# Patient Record
Sex: FEMALE | Race: ASIAN | NOT HISPANIC OR LATINO | Employment: UNEMPLOYED | URBAN - METROPOLITAN AREA
[De-identification: names, ages, dates, MRNs, and addresses within clinical notes are randomized per-mention and may not be internally consistent; named-entity substitution may affect disease eponyms.]

---

## 2017-03-10 ENCOUNTER — HISTORICAL (OUTPATIENT)
Dept: INTERNAL MEDICINE | Facility: CLINIC | Age: 42
End: 2017-03-10

## 2017-03-31 ENCOUNTER — HISTORICAL (OUTPATIENT)
Dept: INTERNAL MEDICINE | Facility: CLINIC | Age: 42
End: 2017-03-31

## 2017-04-07 ENCOUNTER — HISTORICAL (OUTPATIENT)
Dept: INTERNAL MEDICINE | Facility: CLINIC | Age: 42
End: 2017-04-07

## 2017-04-20 ENCOUNTER — HISTORICAL (OUTPATIENT)
Dept: ADMINISTRATIVE | Facility: HOSPITAL | Age: 42
End: 2017-04-20

## 2017-11-14 ENCOUNTER — HISTORICAL (OUTPATIENT)
Dept: RADIOLOGY | Facility: HOSPITAL | Age: 42
End: 2017-11-14

## 2017-12-22 ENCOUNTER — HISTORICAL (OUTPATIENT)
Dept: RADIOLOGY | Facility: HOSPITAL | Age: 42
End: 2017-12-22

## 2017-12-22 LAB — POC CREATININE: 0.6 MG/DL (ref 0.6–1.3)

## 2018-03-20 LAB
BILIRUB SERPL-MCNC: NEGATIVE MG/DL
BLOOD URINE, POC: NEGATIVE
CLARITY, POC UA: CLEAR
COLOR, POC UA: NORMAL
GLUCOSE UR QL STRIP: NEGATIVE
KETONES UR QL STRIP: NEGATIVE
LEUKOCYTE EST, POC UA: NEGATIVE
NITRITE, POC UA: NEGATIVE
PH, POC UA: 6
PROTEIN, POC: NORMAL
SPECIFIC GRAVITY, POC UA: 1.03
UROBILINOGEN, POC UA: NORMAL

## 2018-04-05 ENCOUNTER — HOSPITAL ENCOUNTER (OUTPATIENT)
Dept: MEDSURG UNIT | Facility: HOSPITAL | Age: 43
End: 2018-04-08
Attending: INTERNAL MEDICINE | Admitting: INTERNAL MEDICINE

## 2018-04-05 LAB
ABS NEUT (OLG): 3.24 X10(3)/MCL (ref 2.1–9.2)
ALBUMIN SERPL-MCNC: 3.7 GM/DL (ref 3.4–5)
ALBUMIN/GLOB SERPL: 0.8 RATIO (ref 1.1–2)
ALP SERPL-CCNC: 60 UNIT/L (ref 38–126)
ALT SERPL-CCNC: 19 UNIT/L (ref 12–78)
APTT PPP: 34.4 SECOND(S) (ref 24.8–36.9)
AST SERPL-CCNC: 23 UNIT/L (ref 15–37)
BASOPHILS # BLD AUTO: 0.1 X10(3)/MCL (ref 0–0.2)
BASOPHILS NFR BLD AUTO: 1 %
BILIRUB SERPL-MCNC: 0.2 MG/DL (ref 0.2–1)
BILIRUBIN DIRECT+TOT PNL SERPL-MCNC: 0.1 MG/DL (ref 0–0.5)
BILIRUBIN DIRECT+TOT PNL SERPL-MCNC: 0.1 MG/DL (ref 0–0.8)
BUN SERPL-MCNC: 12 MG/DL (ref 7–18)
CALCIUM SERPL-MCNC: 8.6 MG/DL (ref 8.5–10.1)
CHLORIDE SERPL-SCNC: 104 MMOL/L (ref 98–107)
CO2 SERPL-SCNC: 27 MMOL/L (ref 21–32)
CREAT SERPL-MCNC: 0.7 MG/DL (ref 0.55–1.02)
EOSINOPHIL # BLD AUTO: 0.2 X10(3)/MCL (ref 0–0.9)
EOSINOPHIL NFR BLD AUTO: 3 %
ERYTHROCYTE [DISTWIDTH] IN BLOOD BY AUTOMATED COUNT: 16.2 % (ref 11.5–17)
GLOBULIN SER-MCNC: 4.4 GM/DL (ref 2.4–3.5)
GLUCOSE SERPL-MCNC: 76 MG/DL (ref 74–106)
HCT VFR BLD AUTO: 32.9 % (ref 37–47)
HGB BLD-MCNC: 9.4 GM/DL (ref 12–16)
INR PPP: 0.93 (ref 0–1.27)
LYMPHOCYTES # BLD AUTO: 2.3 X10(3)/MCL (ref 0.6–4.6)
LYMPHOCYTES NFR BLD AUTO: 35 % (ref 13–40)
MCH RBC QN AUTO: 21 PG (ref 27–31)
MCHC RBC AUTO-ENTMCNC: 28.6 GM/DL (ref 33–36)
MCV RBC AUTO: 73.6 FL (ref 80–94)
MONOCYTES # BLD AUTO: 0.8 X10(3)/MCL (ref 0.1–1.3)
MONOCYTES NFR BLD AUTO: 12 %
NEUTROPHILS # BLD AUTO: 3.24 X10(3)/MCL (ref 2.1–9.2)
NEUTROPHILS NFR BLD AUTO: 49 %
PLATELET # BLD AUTO: 401 X10(3)/MCL (ref 130–400)
PLATELET # BLD EST: NORMAL 10*3/UL
PMV BLD AUTO: 10.1 FL (ref 7.4–10.4)
POIKILOCYTOSIS BLD QL SMEAR: 1
POTASSIUM SERPL-SCNC: 3.6 MMOL/L (ref 3.5–5.1)
PROT SERPL-MCNC: 8.1 GM/DL (ref 6.4–8.2)
PROTHROMBIN TIME: 12.7 SECOND(S) (ref 12.2–14.7)
RBC # BLD AUTO: 4.47 X10(6)/MCL (ref 4.2–5.4)
SCHISTOCYTES BLD QL AUTO: 1
SODIUM SERPL-SCNC: 138 MMOL/L (ref 136–145)
WBC # SPEC AUTO: 6.6 X10(3)/MCL (ref 4.5–11.5)

## 2018-04-06 LAB
AMPHET UR QL SCN: NORMAL
APPEARANCE, UA: CLEAR
B-HCG SERPL QL: NEGATIVE
BACTERIA SPEC CULT: ABNORMAL /HPF
BARBITURATE SCN PRESENT UR: NORMAL
BENZODIAZ UR QL SCN: NORMAL
BILIRUB UR QL STRIP: NEGATIVE
CANNABINOIDS UR QL SCN: NORMAL
CHOLEST SERPL-MCNC: 168 MG/DL (ref 0–200)
CHOLEST/HDLC SERPL: 3.2 {RATIO} (ref 0–4)
CK SERPL-CCNC: 63 UNIT/L (ref 26–192)
COCAINE UR QL SCN: NORMAL
COLOR UR: YELLOW
CRP SERPL HS-MCNC: <0.16 MG/L (ref 0–3)
ERYTHROCYTE [SEDIMENTATION RATE] IN BLOOD: 11 MM/HR (ref 0–20)
EST. AVERAGE GLUCOSE BLD GHB EST-MCNC: 103 MG/DL
GLUCOSE (UA): NEGATIVE
HBA1C MFR BLD: 5.2 % (ref 4.2–6.3)
HDLC SERPL-MCNC: 52 MG/DL (ref 35–60)
HGB UR QL STRIP: NEGATIVE
KETONES UR QL STRIP: NEGATIVE
LDLC SERPL CALC-MCNC: 100 MG/DL (ref 0–129)
LEUKOCYTE ESTERASE UR QL STRIP: NEGATIVE
MUCOUS THREADS URNS QL MICRO: ABNORMAL
NITRITE UR QL STRIP: NEGATIVE
OPIATES UR QL SCN: NORMAL
PCP UR QL: NORMAL
PH UR STRIP.AUTO: 6.5 [PH] (ref 5–7.5)
PH UR STRIP: 6.5 [PH] (ref 5–9)
PROT UR QL STRIP: NEGATIVE
RBC #/AREA URNS HPF: ABNORMAL /[HPF]
SP GR FLD REFRACTOMETRY: 1.02 (ref 1–1.03)
SP GR UR STRIP: 1.02 (ref 1–1.03)
SQUAMOUS EPITHELIAL, UA: ABNORMAL
TRIGL SERPL-MCNC: 80 MG/DL (ref 30–150)
TROPONIN I SERPL-MCNC: <0.02 NG/ML (ref 0.02–0.49)
UA WBC MAN: ABNORMAL /HPF
UROBILINOGEN UR STRIP-ACNC: 0.2
VLDLC SERPL CALC-MCNC: 16 MG/DL

## 2018-04-07 LAB
(HCYS)2 SERPL-MCNC: 5.3 MCMOL/L (ref 3.2–10.7)
ABS NEUT (OLG): 2.34 X10(3)/MCL (ref 2.1–9.2)
ABS NEUT (OLG): 2.52 X10(3)/MCL (ref 2.1–9.2)
ALBUMIN SERPL-MCNC: 3 GM/DL (ref 3.4–5)
ALBUMIN SERPL-MCNC: 3.1 GM/DL (ref 3.4–5)
ALBUMIN/GLOB SERPL: 0.9 {RATIO}
ALBUMIN/GLOB SERPL: 1 {RATIO}
ALP SERPL-CCNC: 49 UNIT/L (ref 38–126)
ALP SERPL-CCNC: 51 UNIT/L (ref 38–126)
ALT SERPL-CCNC: 14 UNIT/L (ref 12–78)
ALT SERPL-CCNC: 15 UNIT/L (ref 12–78)
AST SERPL-CCNC: 12 UNIT/L (ref 15–37)
AST SERPL-CCNC: 14 UNIT/L (ref 15–37)
AT III ACT/NOR PPP CHRO: 113 % (ref 82–139)
BASOPHILS # BLD AUTO: 0 X10(3)/MCL (ref 0–0.2)
BASOPHILS # BLD AUTO: 0 X10(3)/MCL (ref 0–0.2)
BASOPHILS NFR BLD AUTO: 1 %
BASOPHILS NFR BLD AUTO: 1 %
BILIRUB SERPL-MCNC: 0.3 MG/DL (ref 0.2–1)
BILIRUB SERPL-MCNC: 0.4 MG/DL (ref 0.2–1)
BILIRUBIN DIRECT+TOT PNL SERPL-MCNC: 0.1 MG/DL (ref 0–0.2)
BILIRUBIN DIRECT+TOT PNL SERPL-MCNC: 0.1 MG/DL (ref 0–0.2)
BILIRUBIN DIRECT+TOT PNL SERPL-MCNC: 0.2 MG/DL (ref 0–0.8)
BILIRUBIN DIRECT+TOT PNL SERPL-MCNC: 0.3 MG/DL (ref 0–0.8)
BUN SERPL-MCNC: 7 MG/DL (ref 7–18)
BUN SERPL-MCNC: 7 MG/DL (ref 7–18)
CALCIUM SERPL-MCNC: 7.7 MG/DL (ref 8.5–10.1)
CALCIUM SERPL-MCNC: 8 MG/DL (ref 8.5–10.1)
CHLORIDE SERPL-SCNC: 109 MMOL/L (ref 98–107)
CHLORIDE SERPL-SCNC: 110 MMOL/L (ref 98–107)
CHOLEST SERPL-MCNC: 153 MG/DL (ref 0–200)
CHOLEST/HDLC SERPL: 3.3 {RATIO} (ref 0–4)
CO2 SERPL-SCNC: 26 MMOL/L (ref 21–32)
CO2 SERPL-SCNC: 28 MMOL/L (ref 21–32)
CREAT SERPL-MCNC: 0.5 MG/DL (ref 0.55–1.02)
CREAT SERPL-MCNC: 0.52 MG/DL (ref 0.55–1.02)
CRP SERPL HS-MCNC: 0.22 MG/L (ref 0–3)
EOSINOPHIL # BLD AUTO: 0.2 X10(3)/MCL (ref 0–0.9)
EOSINOPHIL # BLD AUTO: 0.2 X10(3)/MCL (ref 0–0.9)
EOSINOPHIL NFR BLD AUTO: 3 %
EOSINOPHIL NFR BLD AUTO: 4 %
ERYTHROCYTE [DISTWIDTH] IN BLOOD BY AUTOMATED COUNT: 16.2 % (ref 11.5–17)
ERYTHROCYTE [DISTWIDTH] IN BLOOD BY AUTOMATED COUNT: 16.2 % (ref 11.5–17)
ERYTHROCYTE [SEDIMENTATION RATE] IN BLOOD: 14 MM/HR (ref 0–20)
EST. AVERAGE GLUCOSE BLD GHB EST-MCNC: 100 MG/DL
GLOBULIN SER-MCNC: 3.1 GM/DL (ref 2.4–3.5)
GLOBULIN SER-MCNC: 3.5 GM/DL (ref 2.4–3.5)
GLUCOSE SERPL-MCNC: 82 MG/DL (ref 74–106)
GLUCOSE SERPL-MCNC: 84 MG/DL (ref 74–106)
HBA1C MFR BLD: 5.1 % (ref 4.2–6.3)
HCT VFR BLD AUTO: 28.9 % (ref 37–47)
HCT VFR BLD AUTO: 31.6 % (ref 37–47)
HDLC SERPL-MCNC: 47 MG/DL (ref 35–60)
HGB BLD-MCNC: 8.4 GM/DL (ref 12–16)
HGB BLD-MCNC: 8.9 GM/DL (ref 12–16)
HIV 1+2 AB+HIV1 P24 AG SERPL QL IA: NEGATIVE
LDLC SERPL CALC-MCNC: 85 MG/DL (ref 0–129)
LYMPHOCYTES # BLD AUTO: 2.1 X10(3)/MCL (ref 0.6–4.6)
LYMPHOCYTES # BLD AUTO: 2.1 X10(3)/MCL (ref 0.6–4.6)
LYMPHOCYTES NFR BLD AUTO: 39 %
LYMPHOCYTES NFR BLD AUTO: 40 %
MAGNESIUM SERPL-MCNC: 2.1 MG/DL (ref 1.8–2.4)
MCH RBC QN AUTO: 21 PG (ref 27–31)
MCH RBC QN AUTO: 21.3 PG (ref 27–31)
MCHC RBC AUTO-ENTMCNC: 28.2 GM/DL (ref 33–36)
MCHC RBC AUTO-ENTMCNC: 29.1 GM/DL (ref 33–36)
MCV RBC AUTO: 72.3 FL (ref 80–94)
MCV RBC AUTO: 75.8 FL (ref 80–94)
MONOCYTES # BLD AUTO: 0.5 X10(3)/MCL (ref 0.1–1.3)
MONOCYTES # BLD AUTO: 0.6 X10(3)/MCL (ref 0.1–1.3)
MONOCYTES NFR BLD AUTO: 12 %
MONOCYTES NFR BLD AUTO: 9 %
NEUTROPHILS # BLD AUTO: 2.34 X10(3)/MCL (ref 2.1–9.2)
NEUTROPHILS # BLD AUTO: 2.52 X10(3)/MCL (ref 1.4–7.9)
NEUTROPHILS NFR BLD AUTO: 44 %
NEUTROPHILS NFR BLD AUTO: 47 %
PHOSPHATE SERPL-MCNC: 3.6 MG/DL (ref 2.5–4.9)
PLATELET # BLD AUTO: 346 X10(3)/MCL (ref 130–400)
PLATELET # BLD AUTO: 358 X10(3)/MCL (ref 130–400)
PMV BLD AUTO: 10.3 FL (ref 9.4–12.4)
PMV BLD AUTO: 10.7 FL (ref 9.4–12.4)
POTASSIUM SERPL-SCNC: 4.1 MMOL/L (ref 3.5–5.1)
POTASSIUM SERPL-SCNC: 4.1 MMOL/L (ref 3.5–5.1)
PROT SERPL-MCNC: 6.1 GM/DL (ref 6.4–8.2)
PROT SERPL-MCNC: 6.5 GM/DL
PROT SERPL-MCNC: 6.6 GM/DL (ref 6.4–8.2)
RBC # BLD AUTO: 4 X10(6)/MCL (ref 4.2–5.4)
RBC # BLD AUTO: 4.17 X10(6)/MCL (ref 4.2–5.4)
RPR SER QL: NORMAL
SODIUM SERPL-SCNC: 141 MMOL/L (ref 136–145)
SODIUM SERPL-SCNC: 142 MMOL/L (ref 136–145)
TRIGL SERPL-MCNC: 104 MG/DL (ref 30–150)
VLDLC SERPL CALC-MCNC: 21 MG/DL
WBC # SPEC AUTO: 5.3 X10(3)/MCL (ref 4.5–11.5)
WBC # SPEC AUTO: 5.4 X10(3)/MCL (ref 4.5–11.5)

## 2018-04-08 LAB
ABS NEUT (OLG): 2.31 X10(3)/MCL (ref 2.1–9.2)
BASOPHILS # BLD AUTO: 0.1 X10(3)/MCL (ref 0–0.2)
BASOPHILS NFR BLD AUTO: 1 %
BUN SERPL-MCNC: 9 MG/DL (ref 7–18)
CALCIUM SERPL-MCNC: 8.5 MG/DL (ref 8.5–10.1)
CHLORIDE SERPL-SCNC: 108 MMOL/L (ref 98–107)
CO2 SERPL-SCNC: 25 MMOL/L (ref 21–32)
CREAT SERPL-MCNC: 0.52 MG/DL (ref 0.55–1.02)
CREAT/UREA NIT SERPL: 17.3
EOSINOPHIL # BLD AUTO: 0.2 X10(3)/MCL (ref 0–0.9)
EOSINOPHIL NFR BLD AUTO: 4 %
ERYTHROCYTE [DISTWIDTH] IN BLOOD BY AUTOMATED COUNT: 16.2 % (ref 11.5–17)
FINAL CULTURE: NO GROWTH
GLUCOSE SERPL-MCNC: 88 MG/DL (ref 74–106)
HCT VFR BLD AUTO: 29.6 % (ref 37–47)
HGB BLD-MCNC: 8.3 GM/DL (ref 12–16)
LYMPHOCYTES # BLD AUTO: 2.1 X10(3)/MCL (ref 0.6–4.6)
LYMPHOCYTES NFR BLD AUTO: 40 %
MCH RBC QN AUTO: 21.3 PG (ref 27–31)
MCHC RBC AUTO-ENTMCNC: 28 GM/DL (ref 33–36)
MCV RBC AUTO: 75.9 FL (ref 80–94)
MONOCYTES # BLD AUTO: 0.6 X10(3)/MCL (ref 0.1–1.3)
MONOCYTES NFR BLD AUTO: 11 %
NEUTROPHILS # BLD AUTO: 2.31 X10(3)/MCL (ref 1.4–7.9)
NEUTROPHILS NFR BLD AUTO: 44 %
PLATELET # BLD AUTO: 362 X10(3)/MCL (ref 130–400)
PMV BLD AUTO: 10.4 FL (ref 9.4–12.4)
POTASSIUM SERPL-SCNC: 4.1 MMOL/L (ref 3.5–5.1)
RBC # BLD AUTO: 3.9 X10(6)/MCL (ref 4.2–5.4)
SODIUM SERPL-SCNC: 139 MMOL/L (ref 136–145)
WBC # SPEC AUTO: 5.2 X10(3)/MCL (ref 4.5–11.5)

## 2018-04-13 LAB
FINAL CULTURE: NORMAL
FINAL CULTURE: NORMAL

## 2018-06-19 ENCOUNTER — HISTORICAL (OUTPATIENT)
Dept: ADMINISTRATIVE | Facility: HOSPITAL | Age: 43
End: 2018-06-19

## 2018-06-19 ENCOUNTER — HISTORICAL (OUTPATIENT)
Dept: LAB | Facility: HOSPITAL | Age: 43
End: 2018-06-19

## 2018-06-19 LAB
ABS NEUT (OLG): 6.33 X10(3)/MCL (ref 2.1–9.2)
ANISOCYTOSIS BLD QL SMEAR: NORMAL
BASOPHILS # BLD AUTO: 0.07 X10(3)/MCL
BASOPHILS NFR BLD AUTO: 1 %
DACRYOCYTES BLD QL SMEAR: NORMAL
EOSINOPHIL # BLD AUTO: 0.14 X10(3)/MCL
EOSINOPHIL NFR BLD AUTO: 2 %
ERYTHROCYTE [DISTWIDTH] IN BLOOD BY AUTOMATED COUNT: 18.6 % (ref 11.5–14.5)
HCT VFR BLD AUTO: 28.7 % (ref 35–46)
HGB BLD-MCNC: 7.8 GM/DL (ref 12–16)
HYPOCHROMIA BLD QL SMEAR: NORMAL
IMM GRANULOCYTES # BLD AUTO: 0.02 10*3/UL
IMM GRANULOCYTES NFR BLD AUTO: 0 %
IRON SATN MFR SERPL: 3.7 % (ref 15–50)
IRON SERPL-MCNC: 15 MCG/DL (ref 50–170)
LYMPHOCYTES # BLD AUTO: 1.37 X10(3)/MCL
LYMPHOCYTES NFR BLD AUTO: 15 % (ref 13–40)
MCH RBC QN AUTO: 18.5 PG (ref 26–34)
MCHC RBC AUTO-ENTMCNC: 27.2 GM/DL (ref 31–37)
MCV RBC AUTO: 68.2 FL (ref 80–100)
MICROCYTES BLD QL SMEAR: NORMAL
MONOCYTES # BLD AUTO: 1.18 X10(3)/MCL
MONOCYTES NFR BLD AUTO: 13 % (ref 4–12)
NEUTROPHILS # BLD AUTO: 6.33 X10(3)/MCL
NEUTROPHILS NFR BLD AUTO: 70 X10(3)/MCL
PLATELET # BLD AUTO: 341 X10(3)/MCL (ref 130–400)
PLATELET # BLD EST: ADEQUATE 10*3/UL
PMV BLD AUTO: 10.6 FL (ref 7.4–10.4)
POLYCHROMASIA BLD QL SMEAR: NORMAL
RBC # BLD AUTO: 4.21 X10(6)/MCL (ref 4–5.2)
RBC MORPH BLD: NORMAL
TIBC SERPL-MCNC: 402 MCG/DL (ref 250–450)
TRANSFERRIN SERPL-MCNC: 340 MG/DL (ref 200–360)
WBC # SPEC AUTO: 9.1 X10(3)/MCL (ref 4.5–11)

## 2018-06-21 ENCOUNTER — HISTORICAL (OUTPATIENT)
Dept: INTERNAL MEDICINE | Facility: CLINIC | Age: 43
End: 2018-06-21

## 2018-06-21 ENCOUNTER — HISTORICAL (OUTPATIENT)
Dept: ADMINISTRATIVE | Facility: HOSPITAL | Age: 43
End: 2018-06-21

## 2018-06-21 LAB
ABS NEUT (OLG): 5.46 X10(3)/MCL (ref 2.1–9.2)
APPEARANCE, UA: CLEAR
BACTERIA #/AREA URNS AUTO: ABNORMAL /[HPF]
BASOPHILS # BLD AUTO: 0.07 X10(3)/MCL
BASOPHILS NFR BLD AUTO: 1 %
BILIRUB UR QL STRIP: NEGATIVE
COLOR UR: YELLOW
EOSINOPHIL # BLD AUTO: 0.15 X10(3)/MCL
EOSINOPHIL NFR BLD AUTO: 2 %
ERYTHROCYTE [DISTWIDTH] IN BLOOD BY AUTOMATED COUNT: 18.8 % (ref 11.5–14.5)
FINAL CULTURE: NORMAL
GLUCOSE (UA): NORMAL
HCT VFR BLD AUTO: 29.8 % (ref 35–46)
HGB BLD-MCNC: 7.8 GM/DL (ref 12–16)
HGB UR QL STRIP: NEGATIVE
HYALINE CASTS #/AREA URNS LPF: ABNORMAL /[LPF]
IMM GRANULOCYTES # BLD AUTO: 0.02 10*3/UL
IMM GRANULOCYTES NFR BLD AUTO: 0 %
KETONES UR QL STRIP: NEGATIVE
LEUKOCYTE ESTERASE UR QL STRIP: NEGATIVE
LYMPHOCYTES # BLD AUTO: 1.97 X10(3)/MCL
LYMPHOCYTES NFR BLD AUTO: 23 % (ref 13–40)
MCH RBC QN AUTO: 18.2 PG (ref 26–34)
MCHC RBC AUTO-ENTMCNC: 26.2 GM/DL (ref 31–37)
MCV RBC AUTO: 69.5 FL (ref 80–100)
MONOCYTES # BLD AUTO: 0.99 X10(3)/MCL
MONOCYTES NFR BLD AUTO: 11 % (ref 4–12)
NEUTROPHILS # BLD AUTO: 5.46 X10(3)/MCL
NEUTROPHILS NFR BLD AUTO: 63 X10(3)/MCL
NITRITE UR QL STRIP: NEGATIVE
PH UR STRIP: 6.5 [PH] (ref 4.5–8)
PLATELET # BLD AUTO: 362 X10(3)/MCL (ref 130–400)
PMV BLD AUTO: 10.8 FL (ref 7.4–10.4)
PROT UR QL STRIP: 30 MG/DL
RBC # BLD AUTO: 4.29 X10(6)/MCL (ref 4–5.2)
RBC #/AREA URNS AUTO: ABNORMAL /[HPF]
SP GR UR STRIP: 1.03 (ref 1–1.03)
SQUAMOUS #/AREA URNS LPF: >100 /[LPF]
UROBILINOGEN UR STRIP-ACNC: 2 MG/DL
WBC # SPEC AUTO: 8.7 X10(3)/MCL (ref 4.5–11)
WBC #/AREA URNS AUTO: ABNORMAL /HPF

## 2018-07-12 ENCOUNTER — HISTORICAL (OUTPATIENT)
Dept: INTERNAL MEDICINE | Facility: CLINIC | Age: 43
End: 2018-07-12

## 2018-07-12 LAB
ABS NEUT (OLG): 3 X10(3)/MCL
ANISOCYTOSIS BLD QL SMEAR: ABNORMAL
BASOPHILS NFR BLD MANUAL: 2 %
EOSINOPHIL NFR BLD MANUAL: 1 %
ERYTHROCYTE [DISTWIDTH] IN BLOOD BY AUTOMATED COUNT: ABNORMAL % (ref 11.5–14.5)
GRANULOCYTES NFR BLD MANUAL: 54 % (ref 43–75)
HCT VFR BLD AUTO: 38.3 % (ref 35–46)
HGB BLD-MCNC: 10.9 GM/DL (ref 12–16)
HYPOCHROMIA BLD QL SMEAR: ABNORMAL
LYMPHOCYTES NFR BLD MANUAL: 36 % (ref 20.5–51.1)
MACROCYTES BLD QL SMEAR: ABNORMAL
MCH RBC QN AUTO: 22.9 PG (ref 26–34)
MCHC RBC AUTO-ENTMCNC: 28.5 GM/DL (ref 31–37)
MCV RBC AUTO: 80.3 FL (ref 80–100)
MICROCYTES BLD QL SMEAR: ABNORMAL
MONOCYTES NFR BLD MANUAL: 7 % (ref 2–9)
OVALOCYTES BLD QL SMEAR: ABNORMAL
PLATELET # BLD AUTO: 373 X10(3)/MCL (ref 130–400)
PLATELET # BLD EST: NORMAL 10*3/UL
PMV BLD AUTO: 11.1 FL (ref 7.4–10.4)
POLYCHROMASIA BLD QL SMEAR: ABNORMAL
RBC # BLD AUTO: 4.77 X10(6)/MCL (ref 4–5.2)
RBC MORPH BLD: ABNORMAL
SCHISTOCYTES BLD QL AUTO: ABNORMAL
WBC # SPEC AUTO: 5.9 X10(3)/MCL (ref 4.5–11)

## 2018-07-17 ENCOUNTER — HISTORICAL (OUTPATIENT)
Dept: INTERNAL MEDICINE | Facility: CLINIC | Age: 43
End: 2018-07-17

## 2018-07-24 LAB — POC BETA-HCG (QUAL): NEGATIVE

## 2018-07-25 ENCOUNTER — HISTORICAL (OUTPATIENT)
Dept: FAMILY MEDICINE | Facility: CLINIC | Age: 43
End: 2018-07-25

## 2018-07-25 LAB
ABS NEUT (OLG): 3.33 X10(3)/MCL (ref 2.1–9.2)
ANISOCYTOSIS BLD QL SMEAR: NORMAL
BASOPHILS # BLD AUTO: 0.05 X10(3)/MCL
BASOPHILS NFR BLD AUTO: 1 %
EOSINOPHIL # BLD AUTO: 0.1 X10(3)/MCL
EOSINOPHIL NFR BLD AUTO: 2 %
ERYTHROCYTE [DISTWIDTH] IN BLOOD BY AUTOMATED COUNT: ABNORMAL % (ref 11.5–14.5)
HCT VFR BLD AUTO: 37.4 % (ref 35–46)
HGB BLD-MCNC: 11.1 GM/DL (ref 12–16)
HYPOCHROMIA BLD QL SMEAR: NORMAL
IMM GRANULOCYTES # BLD AUTO: 0.02 10*3/UL
IMM GRANULOCYTES NFR BLD AUTO: 0 %
LYMPHOCYTES # BLD AUTO: 2.08 X10(3)/MCL
LYMPHOCYTES NFR BLD AUTO: 33 % (ref 13–40)
MCH RBC QN AUTO: 24.3 PG (ref 26–34)
MCHC RBC AUTO-ENTMCNC: 29.7 GM/DL (ref 31–37)
MCV RBC AUTO: 81.8 FL (ref 80–100)
MICROCYTES BLD QL SMEAR: NORMAL
MONOCYTES # BLD AUTO: 0.72 X10(3)/MCL
MONOCYTES NFR BLD AUTO: 11 % (ref 4–12)
NEUTROPHILS # BLD AUTO: 3.33 X10(3)/MCL
NEUTROPHILS NFR BLD AUTO: 53 X10(3)/MCL
PLATELET # BLD AUTO: 285 X10(3)/MCL (ref 130–400)
PLATELET # BLD EST: ADEQUATE 10*3/UL
PMV BLD AUTO: 10.4 FL (ref 7.4–10.4)
POIKILOCYTOSIS BLD QL SMEAR: NORMAL
POLYCHROMASIA BLD QL SMEAR: NORMAL
RBC # BLD AUTO: 4.57 X10(6)/MCL (ref 4–5.2)
RBC MORPH BLD: NORMAL
TSH SERPL-ACNC: 0.71 MIU/L (ref 0.36–3.74)
WBC # SPEC AUTO: 6.3 X10(3)/MCL (ref 4.5–11)

## 2018-10-05 ENCOUNTER — HISTORICAL (OUTPATIENT)
Dept: RADIOLOGY | Facility: HOSPITAL | Age: 43
End: 2018-10-05

## 2018-10-19 ENCOUNTER — HISTORICAL (OUTPATIENT)
Dept: ADMINISTRATIVE | Facility: HOSPITAL | Age: 43
End: 2018-10-19

## 2018-10-19 LAB
ABS NEUT (OLG): 2.68 X10(3)/MCL (ref 2.1–9.2)
BASOPHILS # BLD AUTO: 0.07 X10(3)/MCL
BASOPHILS NFR BLD AUTO: 1 %
EOSINOPHIL # BLD AUTO: 0.15 X10(3)/MCL
EOSINOPHIL NFR BLD AUTO: 3 %
ERYTHROCYTE [DISTWIDTH] IN BLOOD BY AUTOMATED COUNT: 16.3 % (ref 11.5–14.5)
HAV IGM SERPL QL IA: NONREACTIVE
HBV CORE IGM SERPL QL IA: NONREACTIVE
HBV SURFACE AG SERPL QL IA: NEGATIVE
HCT VFR BLD AUTO: 35.6 % (ref 35–46)
HCV AB SERPL QL IA: NONREACTIVE
HGB BLD-MCNC: 10.6 GM/DL (ref 12–16)
IMM GRANULOCYTES # BLD AUTO: 0.01 10*3/UL
IMM GRANULOCYTES NFR BLD AUTO: 0 %
IRON SATN MFR SERPL: 5.4 % (ref 15–50)
IRON SERPL-MCNC: 23 MCG/DL (ref 50–170)
LYMPHOCYTES # BLD AUTO: 1.76 X10(3)/MCL
LYMPHOCYTES NFR BLD AUTO: 35 % (ref 13–40)
MCH RBC QN AUTO: 23.3 PG (ref 26–34)
MCHC RBC AUTO-ENTMCNC: 29.8 GM/DL (ref 31–37)
MCV RBC AUTO: 78.2 FL (ref 80–100)
MONOCYTES # BLD AUTO: 0.37 X10(3)/MCL
MONOCYTES NFR BLD AUTO: 7 % (ref 4–12)
NEUTROPHILS # BLD AUTO: 2.68 X10(3)/MCL
NEUTROPHILS NFR BLD AUTO: 53 X10(3)/MCL
PLATELET # BLD AUTO: 367 X10(3)/MCL (ref 130–400)
PMV BLD AUTO: 10.9 FL (ref 7.4–10.4)
RBC # BLD AUTO: 4.55 X10(6)/MCL (ref 4–5.2)
TIBC SERPL-MCNC: 424 MCG/DL (ref 250–450)
TRANSFERRIN SERPL-MCNC: 316 MG/DL (ref 200–360)
TSH SERPL-ACNC: 0.56 MIU/L (ref 0.36–3.74)
WBC # SPEC AUTO: 5 X10(3)/MCL (ref 4.5–11)

## 2018-10-25 ENCOUNTER — HISTORICAL (OUTPATIENT)
Dept: RADIOLOGY | Facility: HOSPITAL | Age: 43
End: 2018-10-25

## 2018-12-20 ENCOUNTER — HISTORICAL (OUTPATIENT)
Dept: INTERNAL MEDICINE | Facility: CLINIC | Age: 43
End: 2018-12-20

## 2019-02-27 ENCOUNTER — HISTORICAL (OUTPATIENT)
Dept: INTERNAL MEDICINE | Facility: CLINIC | Age: 44
End: 2019-02-27

## 2019-02-27 LAB
ABS NEUT (OLG): 2.41 X10(3)/MCL (ref 2.1–9.2)
ALBUMIN SERPL-MCNC: 3.9 GM/DL (ref 3.4–5)
ALBUMIN/GLOB SERPL: 0.9 RATIO (ref 1.1–2)
ALP SERPL-CCNC: 64 UNIT/L (ref 45–117)
ALT SERPL-CCNC: 19 UNIT/L (ref 12–78)
AST SERPL-CCNC: 17 UNIT/L (ref 15–37)
BASOPHILS # BLD AUTO: 0.05 X10(3)/MCL
BASOPHILS NFR BLD AUTO: 1 %
BILIRUB SERPL-MCNC: 0.5 MG/DL (ref 0.2–1)
BILIRUBIN DIRECT+TOT PNL SERPL-MCNC: 0.1 MG/DL
BILIRUBIN DIRECT+TOT PNL SERPL-MCNC: 0.4 MG/DL
BUN SERPL-MCNC: 16 MG/DL (ref 7–18)
CALCIUM SERPL-MCNC: 8.5 MG/DL (ref 8.5–10.1)
CHLORIDE SERPL-SCNC: 106 MMOL/L (ref 98–107)
CHOLEST SERPL-MCNC: 178 MG/DL
CHOLEST/HDLC SERPL: 3.5 {RATIO} (ref 0–4.4)
CO2 SERPL-SCNC: 26 MMOL/L (ref 21–32)
CREAT SERPL-MCNC: 0.8 MG/DL (ref 0.6–1.3)
EOSINOPHIL # BLD AUTO: 0.14 X10(3)/MCL
EOSINOPHIL NFR BLD AUTO: 3 %
ERYTHROCYTE [DISTWIDTH] IN BLOOD BY AUTOMATED COUNT: 18.3 % (ref 11.5–14.5)
GLOBULIN SER-MCNC: 4.3 GM/ML (ref 2.3–3.5)
GLUCOSE SERPL-MCNC: 91 MG/DL (ref 74–106)
HCT VFR BLD AUTO: 30.6 % (ref 35–46)
HDLC SERPL-MCNC: 51 MG/DL
HGB BLD-MCNC: 8.5 GM/DL (ref 12–16)
IMM GRANULOCYTES # BLD AUTO: 0.01 10*3/UL
IMM GRANULOCYTES NFR BLD AUTO: 0 %
LDLC SERPL CALC-MCNC: 106 MG/DL (ref 0–130)
LYMPHOCYTES # BLD AUTO: 1.44 X10(3)/MCL
LYMPHOCYTES NFR BLD AUTO: 32 % (ref 13–40)
MCH RBC QN AUTO: 19.6 PG (ref 26–34)
MCHC RBC AUTO-ENTMCNC: 27.8 GM/DL (ref 31–37)
MCV RBC AUTO: 70.7 FL (ref 80–100)
MONOCYTES # BLD AUTO: 0.45 X10(3)/MCL
MONOCYTES NFR BLD AUTO: 10 % (ref 4–12)
NEUTROPHILS # BLD AUTO: 2.41 X10(3)/MCL
NEUTROPHILS NFR BLD AUTO: 54 X10(3)/MCL
PLATELET # BLD AUTO: 417 X10(3)/MCL (ref 130–400)
PMV BLD AUTO: 11.3 FL (ref 7.4–10.4)
POTASSIUM SERPL-SCNC: 4.1 MMOL/L (ref 3.5–5.1)
PROT SERPL-MCNC: 8.2 GM/DL (ref 6.4–8.2)
RBC # BLD AUTO: 4.33 X10(6)/MCL (ref 4–5.2)
SODIUM SERPL-SCNC: 138 MMOL/L (ref 136–145)
TRIGL SERPL-MCNC: 105 MG/DL
VLDLC SERPL CALC-MCNC: 21 MG/DL
WBC # SPEC AUTO: 4.5 X10(3)/MCL (ref 4.5–11)

## 2019-03-12 ENCOUNTER — HISTORICAL (OUTPATIENT)
Dept: RADIOLOGY | Facility: HOSPITAL | Age: 44
End: 2019-03-12

## 2019-03-20 ENCOUNTER — HISTORICAL (OUTPATIENT)
Dept: ADMINISTRATIVE | Facility: HOSPITAL | Age: 44
End: 2019-03-20

## 2019-03-20 LAB
ALBUMIN SERPL-MCNC: 3.5 GM/DL (ref 3.4–5)
ALBUMIN/GLOB SERPL: 0.8 RATIO (ref 1.1–2)
ALP SERPL-CCNC: 93 UNIT/L (ref 45–117)
ALT SERPL-CCNC: 34 UNIT/L (ref 12–78)
APPEARANCE, UA: NORMAL
APTT PPP: 37.1 SECOND(S) (ref 23.3–37)
AST SERPL-CCNC: 20 UNIT/L (ref 15–37)
BACTERIA #/AREA URNS AUTO: ABNORMAL /[HPF]
BILIRUB SERPL-MCNC: 0.4 MG/DL (ref 0.2–1)
BILIRUB UR QL STRIP: NEGATIVE
BILIRUBIN DIRECT+TOT PNL SERPL-MCNC: 0.1 MG/DL
BILIRUBIN DIRECT+TOT PNL SERPL-MCNC: 0.3 MG/DL
BUN SERPL-MCNC: 9 MG/DL (ref 7–18)
CALCIUM SERPL-MCNC: 8.6 MG/DL (ref 8.5–10.1)
CHLORIDE SERPL-SCNC: 107 MMOL/L (ref 98–107)
CO2 SERPL-SCNC: 26 MMOL/L (ref 21–32)
COLOR UR: YELLOW
CREAT SERPL-MCNC: 0.6 MG/DL (ref 0.6–1.3)
ERYTHROCYTE [DISTWIDTH] IN BLOOD BY AUTOMATED COUNT: 19.8 % (ref 11.5–14.5)
GLOBULIN SER-MCNC: 4.5 GM/ML (ref 2.3–3.5)
GLUCOSE (UA): NORMAL
GLUCOSE SERPL-MCNC: 102 MG/DL (ref 74–106)
HCT VFR BLD AUTO: 30.3 % (ref 35–46)
HGB BLD-MCNC: 8.1 GM/DL (ref 12–16)
HGB UR QL STRIP: NEGATIVE
HYALINE CASTS #/AREA URNS LPF: ABNORMAL /[LPF]
INR PPP: 0.91 (ref 0.9–1.2)
KETONES UR QL STRIP: NEGATIVE
LEUKOCYTE ESTERASE UR QL STRIP: NEGATIVE
MCH RBC QN AUTO: 18.8 PG (ref 26–34)
MCHC RBC AUTO-ENTMCNC: 26.7 GM/DL (ref 31–37)
MCV RBC AUTO: 70.3 FL (ref 80–100)
NITRITE UR QL STRIP: NEGATIVE
PH UR STRIP: 6 [PH] (ref 4.5–8)
PLATELET # BLD AUTO: 397 X10(3)/MCL (ref 130–400)
PMV BLD AUTO: 11 FL (ref 7.4–10.4)
POTASSIUM SERPL-SCNC: 3.3 MMOL/L (ref 3.5–5.1)
PROT SERPL-MCNC: 8 GM/DL (ref 6.4–8.2)
PROT UR QL STRIP: NEGATIVE
PROTHROMBIN TIME: 12.2 SECOND(S) (ref 11.9–14.4)
RBC # BLD AUTO: 4.31 X10(6)/MCL (ref 4–5.2)
RBC #/AREA URNS AUTO: ABNORMAL /[HPF]
SODIUM SERPL-SCNC: 140 MMOL/L (ref 136–145)
SP GR UR STRIP: 1.01 (ref 1–1.03)
SQUAMOUS #/AREA URNS LPF: >100 /[LPF]
UROBILINOGEN UR STRIP-ACNC: NORMAL
WBC # SPEC AUTO: 12.4 X10(3)/MCL (ref 4.5–11)
WBC #/AREA URNS AUTO: ABNORMAL /HPF

## 2019-04-10 ENCOUNTER — HISTORICAL (OUTPATIENT)
Dept: ADMINISTRATIVE | Facility: HOSPITAL | Age: 44
End: 2019-04-10

## 2019-04-10 LAB
ABS NEUT (OLG): 2.45 X10(3)/MCL (ref 2.1–9.2)
BASOPHILS # BLD AUTO: 0.08 X10(3)/MCL
BASOPHILS NFR BLD AUTO: 1 %
EOSINOPHIL # BLD AUTO: 0.25 10*3/UL
EOSINOPHIL NFR BLD AUTO: 4 %
ERYTHROCYTE [DISTWIDTH] IN BLOOD BY AUTOMATED COUNT: 19.4 % (ref 11.5–14.5)
FERRITIN SERPL-MCNC: 3.3 NG/ML (ref 10–150)
HCT VFR BLD AUTO: 29.4 % (ref 35–46)
HGB BLD-MCNC: 7.7 GM/DL (ref 12–16)
IMM GRANULOCYTES # BLD AUTO: 0.01 10*3/UL
IMM GRANULOCYTES NFR BLD AUTO: 0 %
IRON SATN MFR SERPL: 2.8 % (ref 15–50)
IRON SERPL-MCNC: 12 MCG/DL (ref 50–170)
LYMPHOCYTES # BLD AUTO: 2.38 X10(3)/MCL
LYMPHOCYTES NFR BLD AUTO: 41 % (ref 13–40)
MCH RBC QN AUTO: 18.2 PG (ref 26–34)
MCHC RBC AUTO-ENTMCNC: 26.2 GM/DL (ref 31–37)
MCV RBC AUTO: 69.7 FL (ref 80–100)
MONOCYTES # BLD AUTO: 0.67 X10(3)/MCL
MONOCYTES NFR BLD AUTO: 12 % (ref 4–12)
NEUTROPHILS # BLD AUTO: 2.45 X10(3)/MCL
NEUTROPHILS NFR BLD AUTO: 42 X10(3)/MCL
PLATELET # BLD AUTO: 418 X10(3)/MCL (ref 130–400)
PMV BLD AUTO: 10.6 FL (ref 7.4–10.4)
RBC # BLD AUTO: 4.22 X10(6)/MCL (ref 4–5.2)
T4 FREE SERPL-MCNC: 0.95 NG/DL (ref 0.76–1.46)
TIBC SERPL-MCNC: 431 MCG/DL (ref 250–450)
TRANSFERRIN SERPL-MCNC: 334 MG/DL (ref 200–360)
TSH SERPL-ACNC: 0.47 MIU/L (ref 0.36–3.74)
WBC # SPEC AUTO: 5.8 X10(3)/MCL (ref 4.5–11)

## 2019-04-26 ENCOUNTER — HOSPITAL ENCOUNTER (OUTPATIENT)
Dept: MEDSURG UNIT | Facility: HOSPITAL | Age: 44
End: 2019-04-27
Attending: INTERNAL MEDICINE | Admitting: INTERNAL MEDICINE

## 2019-04-26 LAB
ABS NEUT (OLG): 3.21 X10(3)/MCL (ref 2.1–9.2)
ABS NEUT (OLG): 3.65 X10(3)/MCL
ALBUMIN SERPL-MCNC: 3.5 GM/DL (ref 3.4–5)
ALBUMIN/GLOB SERPL: 0.8 RATIO (ref 1.1–2)
ALP SERPL-CCNC: 67 UNIT/L (ref 45–117)
ALT SERPL-CCNC: 19 UNIT/L (ref 12–78)
ANISOCYTOSIS BLD QL SMEAR: NORMAL
APTT PPP: 33.7 SECOND(S) (ref 23.3–37)
AST SERPL-CCNC: 16 UNIT/L (ref 15–37)
BASOPHILS # BLD AUTO: 0.07 X10(3)/MCL
BASOPHILS # BLD AUTO: 0.07 X10(3)/MCL
BASOPHILS NFR BLD AUTO: 1 %
BASOPHILS NFR BLD AUTO: 1 %
BILIRUB SERPL-MCNC: 0.3 MG/DL (ref 0.2–1)
BILIRUBIN DIRECT+TOT PNL SERPL-MCNC: 0.1 MG/DL
BILIRUBIN DIRECT+TOT PNL SERPL-MCNC: 0.2 MG/DL
BUN SERPL-MCNC: 11 MG/DL (ref 7–18)
CALCIUM SERPL-MCNC: 8.3 MG/DL (ref 8.5–10.1)
CHLORIDE SERPL-SCNC: 106 MMOL/L (ref 98–107)
CO2 SERPL-SCNC: 27 MMOL/L (ref 21–32)
CREAT SERPL-MCNC: 0.7 MG/DL (ref 0.6–1.3)
CROSSMATCH INTERPRETATION: NORMAL
EOSINOPHIL # BLD AUTO: 0.11 X10(3)/MCL
EOSINOPHIL # BLD AUTO: 0.13 X10(3)/MCL
EOSINOPHIL NFR BLD AUTO: 2 %
EOSINOPHIL NFR BLD AUTO: 2 %
ERYTHROCYTE [DISTWIDTH] IN BLOOD BY AUTOMATED COUNT: 18.9 % (ref 11.5–14.5)
ERYTHROCYTE [DISTWIDTH] IN BLOOD BY AUTOMATED COUNT: 18.9 % (ref 11.5–14.5)
GLOBULIN SER-MCNC: 4.2 GM/ML (ref 2.3–3.5)
GLUCOSE SERPL-MCNC: 104 MG/DL (ref 74–106)
HCT VFR BLD AUTO: 26.7 % (ref 35–46)
HCT VFR BLD AUTO: 28.6 % (ref 35–46)
HGB BLD-MCNC: 7 GM/DL (ref 12–16)
HGB BLD-MCNC: 7.5 GM/DL (ref 12–16)
HYPOCHROMIA BLD QL SMEAR: NORMAL
IMM GRANULOCYTES # BLD AUTO: 0.01 10*3/UL
IMM GRANULOCYTES # BLD AUTO: 0.02 10*3/UL
IMM GRANULOCYTES NFR BLD AUTO: 0 %
IMM GRANULOCYTES NFR BLD AUTO: 0 %
INR PPP: 0.97 (ref 0.9–1.2)
LYMPHOCYTES # BLD AUTO: 1.97 X10(3)/MCL
LYMPHOCYTES # BLD AUTO: 2.5 X10(3)/MCL
LYMPHOCYTES NFR BLD AUTO: 30 % (ref 13–40)
LYMPHOCYTES NFR BLD AUTO: 38 % (ref 13–40)
MACROCYTES BLD QL SMEAR: NORMAL
MCH RBC QN AUTO: 18.3 PG (ref 26–34)
MCH RBC QN AUTO: 18.3 PG (ref 26–34)
MCHC RBC AUTO-ENTMCNC: 26.2 GM/DL (ref 31–37)
MCHC RBC AUTO-ENTMCNC: 26.2 GM/DL (ref 31–37)
MCV RBC AUTO: 69.7 FL (ref 80–100)
MCV RBC AUTO: 69.9 FL (ref 80–100)
MICROCYTES BLD QL SMEAR: NORMAL
MONOCYTES # BLD AUTO: 0.69 X10(3)/MCL
MONOCYTES # BLD AUTO: 0.77 X10(3)/MCL
MONOCYTES NFR BLD AUTO: 10 % (ref 4–12)
MONOCYTES NFR BLD AUTO: 12 % (ref 4–12)
NEUTROPHILS # BLD AUTO: 3.21 X10(3)/MCL
NEUTROPHILS # BLD AUTO: 3.65 X10(3)/MCL
NEUTROPHILS NFR BLD AUTO: 49 X10(3)/MCL
NEUTROPHILS NFR BLD AUTO: 55 %
OVALOCYTES BLD QL SMEAR: NORMAL
PLATELET # BLD AUTO: 442 X10(3)/MCL (ref 130–400)
PLATELET # BLD AUTO: 485 X10(3)/MCL (ref 130–400)
PLATELET # BLD EST: NORMAL 10*3/UL
PMV BLD AUTO: 10.4 FL (ref 7.4–10.4)
PMV BLD AUTO: 9.7 FL (ref 7.4–10.4)
POC BETA-HCG (QUAL): NEGATIVE
POLYCHROMASIA BLD QL SMEAR: NORMAL
POTASSIUM SERPL-SCNC: 3.7 MMOL/L (ref 3.5–5.1)
PRODUCT READY: NORMAL
PROT SERPL-MCNC: 7.7 GM/DL (ref 6.4–8.2)
PROTHROMBIN TIME: 12.8 SECOND(S) (ref 11.9–14.4)
RBC # BLD AUTO: 3.83 X10(6)/MCL (ref 4–5.2)
RBC # BLD AUTO: 4.09 X10(6)/MCL (ref 4–5.2)
RBC MORPH BLD: NORMAL
SCHISTOCYTES BLD QL AUTO: NORMAL
SODIUM SERPL-SCNC: 138 MMOL/L (ref 136–145)
TRANSFUSION ORDER: NORMAL
TRANSFUSION ORDER: NORMAL
WBC # SPEC AUTO: 6.6 X10(3)/MCL (ref 4.5–11)
WBC # SPEC AUTO: 6.6 X10(3)/MCL (ref 4.5–11)

## 2019-04-27 LAB
ABS NEUT (OLG): 2.55 X10(3)/MCL (ref 2.1–9.2)
BASOPHILS # BLD AUTO: 0.07 X10(3)/MCL
BASOPHILS NFR BLD AUTO: 1 %
EOSINOPHIL # BLD AUTO: 0.14 X10(3)/MCL
EOSINOPHIL NFR BLD AUTO: 3 %
ERYTHROCYTE [DISTWIDTH] IN BLOOD BY AUTOMATED COUNT: 20.8 % (ref 11.5–14.5)
HCT VFR BLD AUTO: 36.2 % (ref 35–46)
HGB BLD-MCNC: 10.6 GM/DL (ref 12–16)
IMM GRANULOCYTES # BLD AUTO: 0.02 10*3/UL
IMM GRANULOCYTES NFR BLD AUTO: 0 %
LYMPHOCYTES # BLD AUTO: 1.61 X10(3)/MCL
LYMPHOCYTES NFR BLD AUTO: 32 % (ref 13–40)
MCH RBC QN AUTO: 21.3 PG (ref 26–34)
MCHC RBC AUTO-ENTMCNC: 29.3 GM/DL (ref 31–37)
MCV RBC AUTO: 72.7 FL (ref 80–100)
MONOCYTES # BLD AUTO: 0.67 X10(3)/MCL
MONOCYTES NFR BLD AUTO: 13 % (ref 4–12)
NEUTROPHILS # BLD AUTO: 2.55 X10(3)/MCL
NEUTROPHILS NFR BLD AUTO: 50 X10(3)/MCL
PLATELET # BLD AUTO: 375 X10(3)/MCL (ref 130–400)
PMV BLD AUTO: 10.7 FL (ref 7.4–10.4)
RBC # BLD AUTO: 4.98 X10(6)/MCL (ref 4–5.2)
WBC # SPEC AUTO: 5.1 X10(3)/MCL (ref 4.5–11)

## 2019-04-29 ENCOUNTER — HISTORICAL (OUTPATIENT)
Dept: ADMINISTRATIVE | Facility: HOSPITAL | Age: 44
End: 2019-04-29

## 2019-04-29 LAB
CRP SERPL-MCNC: <0.3 MG/DL
FERRITIN SERPL-MCNC: 5.8 NG/ML (ref 10–150)
FOLATE SERPL-MCNC: 15.9 NG/ML (ref 3.1–17.5)
HAPTOGLOB SERPL-MCNC: 84 MG/DL (ref 31–200)
IRON SATN MFR SERPL: 4.4 % (ref 15–50)
IRON SERPL-MCNC: 18 MCG/DL (ref 50–170)
LDH SERPL-CCNC: 152 UNIT/L (ref 84–246)
PROT SERPL-MCNC: 7.5 GM/DL
RET# (OHS): 0.08 X10(6)/MCL (ref 0.02–0.08)
RETICULOCYTE COUNT AUTOMATED (OLG): 1.6 % (ref 0.5–1.5)
TIBC SERPL-MCNC: 406 MCG/DL (ref 250–450)
TRANSFERRIN SERPL-MCNC: 302 MG/DL (ref 200–360)
VIT B12 SERPL-MCNC: 474 PG/ML (ref 193–986)

## 2019-05-02 ENCOUNTER — HISTORICAL (OUTPATIENT)
Dept: ENDOSCOPY | Facility: HOSPITAL | Age: 44
End: 2019-05-02

## 2019-05-02 LAB — B-HCG SERPL QL: NEGATIVE

## 2019-05-13 ENCOUNTER — HISTORICAL (OUTPATIENT)
Dept: RADIOLOGY | Facility: HOSPITAL | Age: 44
End: 2019-05-13

## 2019-06-11 ENCOUNTER — HISTORICAL (OUTPATIENT)
Dept: ADMINISTRATIVE | Facility: HOSPITAL | Age: 44
End: 2019-06-11

## 2019-06-11 LAB
ABS NEUT (OLG): 2.74 X10(3)/MCL
ALBUMIN SERPL-MCNC: 3.7 GM/DL (ref 3.4–5)
ALBUMIN/GLOB SERPL: 0.8 RATIO (ref 1.1–2)
ALP SERPL-CCNC: 84 UNIT/L (ref 45–117)
ALT SERPL-CCNC: 26 UNIT/L (ref 12–78)
ANISOCYTOSIS BLD QL SMEAR: NORMAL
AST SERPL-CCNC: 23 UNIT/L (ref 15–37)
BASOPHILS # BLD AUTO: 0.07 X10(3)/MCL
BASOPHILS NFR BLD AUTO: 1 %
BILIRUB SERPL-MCNC: 0.4 MG/DL (ref 0.2–1)
BILIRUBIN DIRECT+TOT PNL SERPL-MCNC: 0.1 MG/DL
BILIRUBIN DIRECT+TOT PNL SERPL-MCNC: 0.3 MG/DL
BUN SERPL-MCNC: 11 MG/DL (ref 7–18)
CALCIUM SERPL-MCNC: 8.3 MG/DL (ref 8.5–10.1)
CHLORIDE SERPL-SCNC: 104 MMOL/L (ref 98–107)
CO2 SERPL-SCNC: 29 MMOL/L (ref 21–32)
CREAT SERPL-MCNC: 0.8 MG/DL (ref 0.6–1.3)
EOSINOPHIL # BLD AUTO: 0.14 X10(3)/MCL
EOSINOPHIL NFR BLD AUTO: 3 %
ERYTHROCYTE [DISTWIDTH] IN BLOOD BY AUTOMATED COUNT: 20.4 % (ref 11.5–14.5)
FERRITIN SERPL-MCNC: 4.6 NG/ML (ref 10–150)
GLOBULIN SER-MCNC: 4.5 GM/ML (ref 2.3–3.5)
GLUCOSE SERPL-MCNC: 91 MG/DL (ref 74–106)
HCT VFR BLD AUTO: 35.7 % (ref 35–46)
HGB BLD-MCNC: 10.5 GM/DL (ref 12–16)
HYPOCHROMIA BLD QL SMEAR: NORMAL
IMM GRANULOCYTES # BLD AUTO: 0.01 10*3/UL
IMM GRANULOCYTES NFR BLD AUTO: 0 %
IRON SATN MFR SERPL: 6 % (ref 15–50)
IRON SERPL-MCNC: 25 MCG/DL (ref 50–170)
LYMPHOCYTES # BLD AUTO: 1.91 X10(3)/MCL
LYMPHOCYTES NFR BLD AUTO: 35 % (ref 13–40)
MACROCYTES BLD QL SMEAR: NORMAL
MCH RBC QN AUTO: 22.2 PG (ref 26–34)
MCHC RBC AUTO-ENTMCNC: 29.4 GM/DL (ref 31–37)
MCV RBC AUTO: 75.3 FL (ref 80–100)
MICROCYTES BLD QL SMEAR: NORMAL
MONOCYTES # BLD AUTO: 0.61 X10(3)/MCL
MONOCYTES NFR BLD AUTO: 11 % (ref 4–12)
NEUTROPHILS # BLD AUTO: 2.74 X10(3)/MCL
NEUTROPHILS NFR BLD AUTO: 50 %
PLATELET # BLD AUTO: 387 X10(3)/MCL (ref 130–400)
PLATELET # BLD EST: NORMAL 10*3/UL
PMV BLD AUTO: 10.3 FL (ref 7.4–10.4)
POLYCHROMASIA BLD QL SMEAR: NORMAL
POTASSIUM SERPL-SCNC: 4 MMOL/L (ref 3.5–5.1)
PROT SERPL-MCNC: 8.2 GM/DL (ref 6.4–8.2)
RBC # BLD AUTO: 4.74 X10(6)/MCL (ref 4–5.2)
RBC MORPH BLD: NORMAL
SODIUM SERPL-SCNC: 137 MMOL/L (ref 136–145)
TIBC SERPL-MCNC: 415 MCG/DL (ref 250–450)
TRANSFERRIN SERPL-MCNC: 332 MG/DL (ref 200–360)
WBC # SPEC AUTO: 5.5 X10(3)/MCL (ref 4.5–11)

## 2019-06-17 ENCOUNTER — HISTORICAL (OUTPATIENT)
Dept: INFUSION THERAPY | Facility: HOSPITAL | Age: 44
End: 2019-06-17

## 2019-06-26 ENCOUNTER — HISTORICAL (OUTPATIENT)
Dept: INFUSION THERAPY | Facility: HOSPITAL | Age: 44
End: 2019-06-26

## 2019-08-26 ENCOUNTER — HISTORICAL (OUTPATIENT)
Dept: ADMINISTRATIVE | Facility: HOSPITAL | Age: 44
End: 2019-08-26

## 2019-08-26 LAB
ABS NEUT (OLG): 3.64 X10(3)/MCL (ref 2.1–9.2)
ALBUMIN SERPL-MCNC: 3.5 GM/DL (ref 3.4–5)
ALBUMIN/GLOB SERPL: 0.8 RATIO (ref 1.1–2)
ALP SERPL-CCNC: 80 UNIT/L (ref 45–117)
ALT SERPL-CCNC: 22 UNIT/L (ref 12–78)
AST SERPL-CCNC: 18 UNIT/L (ref 15–37)
BASOPHILS # BLD AUTO: 0.04 X10(3)/MCL
BASOPHILS NFR BLD AUTO: 1 %
BILIRUB SERPL-MCNC: 0.3 MG/DL (ref 0.2–1)
BILIRUBIN DIRECT+TOT PNL SERPL-MCNC: <0.1 MG/DL
BILIRUBIN DIRECT+TOT PNL SERPL-MCNC: >0.2 MG/DL
BUN SERPL-MCNC: 8 MG/DL (ref 7–18)
CALCIUM SERPL-MCNC: 8.7 MG/DL (ref 8.5–10.1)
CHLORIDE SERPL-SCNC: 105 MMOL/L (ref 98–107)
CO2 SERPL-SCNC: 29 MMOL/L (ref 21–32)
CREAT SERPL-MCNC: 0.7 MG/DL (ref 0.6–1.3)
EOSINOPHIL # BLD AUTO: 0.15 X10(3)/MCL
EOSINOPHIL NFR BLD AUTO: 2 %
ERYTHROCYTE [DISTWIDTH] IN BLOOD BY AUTOMATED COUNT: 18.6 % (ref 11.5–14.5)
FERRITIN SERPL-MCNC: 31.5 NG/ML (ref 10–150)
GLOBULIN SER-MCNC: 4.2 GM/ML (ref 2.3–3.5)
GLUCOSE SERPL-MCNC: 81 MG/DL (ref 74–106)
HCT VFR BLD AUTO: 45.4 % (ref 35–46)
HGB BLD-MCNC: 14.6 GM/DL (ref 12–16)
IMM GRANULOCYTES # BLD AUTO: 0.01 10*3/UL
IMM GRANULOCYTES NFR BLD AUTO: 0 %
IRON SATN MFR SERPL: 29.2 % (ref 15–50)
IRON SERPL-MCNC: 76 MCG/DL (ref 50–170)
LYMPHOCYTES # BLD AUTO: 1.85 X10(3)/MCL
LYMPHOCYTES NFR BLD AUTO: 30 % (ref 13–40)
MCH RBC QN AUTO: 28.9 PG (ref 26–34)
MCHC RBC AUTO-ENTMCNC: 32.2 GM/DL (ref 31–37)
MCV RBC AUTO: 89.9 FL (ref 80–100)
MONOCYTES # BLD AUTO: 0.56 X10(3)/MCL
MONOCYTES NFR BLD AUTO: 9 % (ref 0–24)
NEUTROPHILS # BLD AUTO: 3.64 X10(3)/MCL
NEUTROPHILS NFR BLD AUTO: 58 X10(3)/MCL
PLATELET # BLD AUTO: 267 X10(3)/MCL (ref 130–400)
PMV BLD AUTO: 10.2 FL (ref 7.4–10.4)
POTASSIUM SERPL-SCNC: 4.7 MMOL/L (ref 3.5–5.1)
PROT SERPL-MCNC: 7.7 GM/DL (ref 6.4–8.2)
RBC # BLD AUTO: 5.05 X10(6)/MCL (ref 4–5.2)
SODIUM SERPL-SCNC: 139 MMOL/L (ref 136–145)
TIBC SERPL-MCNC: 260 MCG/DL (ref 250–450)
TRANSFERRIN SERPL-MCNC: 222 MG/DL (ref 200–360)
WBC # SPEC AUTO: 6.2 X10(3)/MCL (ref 4.5–11)

## 2019-11-14 ENCOUNTER — HISTORICAL (OUTPATIENT)
Dept: ADMINISTRATIVE | Facility: HOSPITAL | Age: 44
End: 2019-11-14

## 2019-11-14 LAB
ABS NEUT (OLG): 2.79 X10(3)/MCL (ref 2.1–9.2)
ALBUMIN SERPL-MCNC: 3.6 GM/DL (ref 3.4–5)
ALBUMIN/GLOB SERPL: 0.8 RATIO (ref 1.1–2)
ALP SERPL-CCNC: 80 UNIT/L (ref 45–117)
ALT SERPL-CCNC: 32 UNIT/L (ref 12–78)
APPEARANCE, UA: CLEAR
AST SERPL-CCNC: 28 UNIT/L (ref 15–37)
BACTERIA #/AREA URNS AUTO: ABNORMAL /HPF
BASOPHILS # BLD AUTO: 0.1 X10(3)/MCL (ref 0–0.2)
BASOPHILS NFR BLD AUTO: 1 %
BILIRUB SERPL-MCNC: 0.3 MG/DL (ref 0.2–1)
BILIRUB UR QL STRIP: NEGATIVE
BILIRUBIN DIRECT+TOT PNL SERPL-MCNC: 0.1 MG/DL (ref 0–0.2)
BILIRUBIN DIRECT+TOT PNL SERPL-MCNC: 0.2 MG/DL
BUN SERPL-MCNC: 11 MG/DL (ref 7–18)
CALCIUM SERPL-MCNC: 9.2 MG/DL (ref 8.5–10.1)
CHLORIDE SERPL-SCNC: 106 MMOL/L (ref 98–107)
CO2 SERPL-SCNC: 27 MMOL/L (ref 21–32)
COLOR UR: YELLOW
CREAT SERPL-MCNC: 0.7 MG/DL (ref 0.6–1.3)
EOSINOPHIL # BLD AUTO: 0.2 X10(3)/MCL (ref 0–0.9)
EOSINOPHIL NFR BLD AUTO: 3 %
ERYTHROCYTE [DISTWIDTH] IN BLOOD BY AUTOMATED COUNT: 12.3 % (ref 11.5–14.5)
FERRITIN SERPL-MCNC: 13.7 NG/ML (ref 10–150)
GLOBULIN SER-MCNC: 4.4 GM/ML (ref 2.3–3.5)
GLUCOSE (UA): NEGATIVE
GLUCOSE SERPL-MCNC: 84 MG/DL (ref 74–106)
HCT VFR BLD AUTO: 44.9 % (ref 35–46)
HGB BLD-MCNC: 14.4 GM/DL (ref 12–16)
HGB UR QL STRIP: NEGATIVE
HYALINE CASTS #/AREA URNS LPF: ABNORMAL /LPF
IRON SATN MFR SERPL: 7.5 % (ref 15–50)
IRON SERPL-MCNC: 25 MCG/DL (ref 50–170)
KETONES UR QL STRIP: NEGATIVE
LEUKOCYTE ESTERASE UR QL STRIP: NEGATIVE
LYMPHOCYTES # BLD AUTO: 2 X10(3)/MCL (ref 0.6–4.6)
LYMPHOCYTES NFR BLD AUTO: 36 %
MCH RBC QN AUTO: 30.1 PG (ref 26–34)
MCHC RBC AUTO-ENTMCNC: 32.1 GM/DL (ref 31–37)
MCV RBC AUTO: 93.9 FL (ref 80–100)
MONOCYTES # BLD AUTO: 0.7 X10(3)/MCL (ref 0.1–1.3)
MONOCYTES NFR BLD AUTO: 12 %
NEUTROPHILS # BLD AUTO: 2.79 X10(3)/MCL (ref 2.1–9.2)
NEUTROPHILS NFR BLD AUTO: 49 %
NITRITE UR QL STRIP: NEGATIVE
PH UR STRIP: 6.5 [PH] (ref 4.5–8)
PLATELET # BLD AUTO: 263 X10(3)/MCL (ref 130–400)
PMV BLD AUTO: 10.8 FL (ref 7.4–10.4)
POTASSIUM SERPL-SCNC: 3.8 MMOL/L (ref 3.5–5.1)
PROT SERPL-MCNC: 7.7 GM/DL
PROT SERPL-MCNC: 8 GM/DL (ref 6.4–8.2)
PROT UR QL STRIP: NEGATIVE
RBC # BLD AUTO: 4.78 X10(6)/MCL (ref 4–5.2)
RBC #/AREA URNS AUTO: ABNORMAL /HPF
SODIUM SERPL-SCNC: 141 MMOL/L (ref 136–145)
SP GR UR STRIP: 1.02 (ref 1–1.03)
SQUAMOUS #/AREA URNS LPF: ABNORMAL /LPF
TIBC SERPL-MCNC: 334 MCG/DL (ref 250–450)
TRANSFERRIN SERPL-MCNC: 255 MG/DL (ref 200–360)
UROBILINOGEN UR STRIP-ACNC: NORMAL
WBC # SPEC AUTO: 5.7 X10(3)/MCL (ref 4.5–11)
WBC #/AREA URNS AUTO: ABNORMAL /HPF

## 2019-11-16 LAB — FINAL CULTURE: NO GROWTH

## 2019-12-05 ENCOUNTER — HISTORICAL (OUTPATIENT)
Dept: RADIOLOGY | Facility: HOSPITAL | Age: 44
End: 2019-12-05

## 2020-01-13 ENCOUNTER — HISTORICAL (OUTPATIENT)
Dept: SURGERY | Facility: HOSPITAL | Age: 45
End: 2020-01-13

## 2020-01-29 ENCOUNTER — HISTORICAL (OUTPATIENT)
Dept: ADMINISTRATIVE | Facility: HOSPITAL | Age: 45
End: 2020-01-29

## 2020-01-29 LAB
APPEARANCE, UA: CLEAR
BACTERIA #/AREA URNS AUTO: ABNORMAL /HPF
BILIRUB UR QL STRIP: NEGATIVE
COLOR UR: YELLOW
GLUCOSE (UA): NEGATIVE
HGB UR QL STRIP: NEGATIVE
HYALINE CASTS #/AREA URNS LPF: ABNORMAL /LPF
KETONES UR QL STRIP: NEGATIVE
LEUKOCYTE ESTERASE UR QL STRIP: NEGATIVE
NITRITE UR QL STRIP: NEGATIVE
PH UR STRIP: 8 [PH] (ref 4.5–8)
PROT UR QL STRIP: 10 MG/DL
RBC #/AREA URNS AUTO: ABNORMAL /HPF
SP GR UR STRIP: 1.02 (ref 1–1.03)
SQUAMOUS #/AREA URNS LPF: ABNORMAL /LPF
UROBILINOGEN UR STRIP-ACNC: NORMAL
WBC #/AREA URNS AUTO: ABNORMAL /HPF

## 2020-02-01 LAB — FINAL CULTURE: NO GROWTH

## 2020-06-05 ENCOUNTER — HISTORICAL (OUTPATIENT)
Dept: HEMATOLOGY/ONCOLOGY | Facility: CLINIC | Age: 45
End: 2020-06-05

## 2020-06-05 LAB
ABS NEUT (OLG): 3.05 X10(3)/MCL (ref 2.1–9.2)
ALBUMIN SERPL-MCNC: 3.3 GM/DL (ref 3.4–5)
ALBUMIN/GLOB SERPL: 0.8 RATIO (ref 1.1–2)
ALP SERPL-CCNC: 65 UNIT/L (ref 45–117)
ALT SERPL-CCNC: 22 UNIT/L (ref 12–78)
AST SERPL-CCNC: 19 UNIT/L (ref 15–37)
BASOPHILS # BLD AUTO: 0.1 X10(3)/MCL (ref 0–0.2)
BASOPHILS NFR BLD AUTO: 1 %
BILIRUB SERPL-MCNC: 0.4 MG/DL (ref 0.2–1)
BILIRUBIN DIRECT+TOT PNL SERPL-MCNC: 0.1 MG/DL (ref 0–0.2)
BILIRUBIN DIRECT+TOT PNL SERPL-MCNC: 0.3 MG/DL
BUN SERPL-MCNC: 13 MG/DL (ref 7–18)
CALCIUM SERPL-MCNC: 8.3 MG/DL (ref 8.5–10.1)
CHLORIDE SERPL-SCNC: 109 MMOL/L (ref 98–107)
CO2 SERPL-SCNC: 27 MMOL/L (ref 21–32)
CREAT SERPL-MCNC: 0.7 MG/DL (ref 0.6–1.3)
EOSINOPHIL # BLD AUTO: 0.2 X10(3)/MCL (ref 0–0.9)
EOSINOPHIL NFR BLD AUTO: 3 %
ERYTHROCYTE [DISTWIDTH] IN BLOOD BY AUTOMATED COUNT: 16 % (ref 11.5–14.5)
FERRITIN SERPL-MCNC: 3.7 NG/ML (ref 10–150)
FERRITIN SERPL-MCNC: 3.7 NG/ML (ref 10–150)
GLOBULIN SER-MCNC: 4.2 GM/ML (ref 2.3–3.5)
GLUCOSE SERPL-MCNC: 84 MG/DL (ref 74–106)
HCT VFR BLD AUTO: 33.1 % (ref 35–46)
HGB BLD-MCNC: 9.5 GM/DL (ref 12–16)
IMM GRANULOCYTES # BLD AUTO: 0.01 10*3/UL
IMM GRANULOCYTES NFR BLD AUTO: 0 %
IRON SATN MFR SERPL: 5.4 % (ref 15–50)
IRON SATN MFR SERPL: 5.5 % (ref 15–50)
IRON SERPL-MCNC: 21 MCG/DL (ref 50–170)
IRON SERPL-MCNC: 22 MCG/DL (ref 50–170)
LYMPHOCYTES # BLD AUTO: 1.5 X10(3)/MCL (ref 0.6–4.6)
LYMPHOCYTES NFR BLD AUTO: 28 %
MCH RBC QN AUTO: 21.6 PG (ref 26–34)
MCHC RBC AUTO-ENTMCNC: 28.7 GM/DL (ref 31–37)
MCV RBC AUTO: 75.2 FL (ref 80–100)
MONOCYTES # BLD AUTO: 0.5 X10(3)/MCL (ref 0.1–1.3)
MONOCYTES NFR BLD AUTO: 10 %
NEUTROPHILS # BLD AUTO: 3.05 X10(3)/MCL (ref 2.1–9.2)
NEUTROPHILS NFR BLD AUTO: 57 %
PLATELET # BLD AUTO: 360 X10(3)/MCL (ref 130–400)
PMV BLD AUTO: 10.8 FL (ref 7.4–10.4)
POTASSIUM SERPL-SCNC: 4.2 MMOL/L (ref 3.5–5.1)
PROT SERPL-MCNC: 7 GM/DL
PROT SERPL-MCNC: 7.5 GM/DL (ref 6.4–8.2)
RBC # BLD AUTO: 4.4 X10(6)/MCL (ref 4–5.2)
SODIUM SERPL-SCNC: 138 MMOL/L (ref 136–145)
TIBC SERPL-MCNC: 391 MCG/DL (ref 250–450)
TIBC SERPL-MCNC: 401 MCG/DL (ref 250–450)
TRANSFERRIN SERPL-MCNC: 311 MG/DL (ref 200–360)
TRANSFERRIN SERPL-MCNC: 317 MG/DL (ref 200–360)
WBC # SPEC AUTO: 5.3 X10(3)/MCL (ref 4.5–11)

## 2020-06-15 ENCOUNTER — HISTORICAL (OUTPATIENT)
Dept: INFUSION THERAPY | Facility: HOSPITAL | Age: 45
End: 2020-06-15

## 2020-06-22 ENCOUNTER — HISTORICAL (OUTPATIENT)
Dept: INFUSION THERAPY | Facility: HOSPITAL | Age: 45
End: 2020-06-22

## 2020-07-20 ENCOUNTER — HISTORICAL (OUTPATIENT)
Dept: ADMINISTRATIVE | Facility: HOSPITAL | Age: 45
End: 2020-07-20

## 2020-07-20 LAB
ABS NEUT (OLG): 3.5 X10(3)/MCL (ref 2.1–9.2)
ANISOCYTOSIS BLD QL SMEAR: ABNORMAL
BASOPHILS NFR BLD MANUAL: 1 %
EOSINOPHIL NFR BLD MANUAL: 2 %
ERYTHROCYTE [DISTWIDTH] IN BLOOD BY AUTOMATED COUNT: ABNORMAL % (ref 11.5–14.5)
FERRITIN SERPL-MCNC: 117 NG/ML (ref 10–150)
HCT VFR BLD AUTO: 44.4 % (ref 35–46)
HGB BLD-MCNC: 13.9 GM/DL (ref 12–16)
IRON SATN MFR SERPL: 33.2 % (ref 15–50)
IRON SERPL-MCNC: 99 MCG/DL (ref 50–170)
LYMPHOCYTES NFR BLD MANUAL: 34 % (ref 20.5–51.1)
MCH RBC QN AUTO: 26.6 PG (ref 26–34)
MCHC RBC AUTO-ENTMCNC: 31.3 GM/DL (ref 31–37)
MCV RBC AUTO: 85.1 FL (ref 80–100)
MICROCYTES BLD QL SMEAR: ABNORMAL
MONOCYTES NFR BLD MANUAL: 12 % (ref 2–9)
NEUTROPHILS NFR BLD MANUAL: 51 % (ref 47–80)
PLATELET # BLD AUTO: 252 X10(3)/MCL (ref 130–400)
PLATELET # BLD EST: ABNORMAL 10*3/UL
PMV BLD AUTO: 10.9 FL (ref 7.4–10.4)
RBC # BLD AUTO: 5.22 X10(6)/MCL (ref 4–5.2)
RBC MORPH BLD: ABNORMAL
TIBC SERPL-MCNC: 298 MCG/DL (ref 250–450)
TRANSFERRIN SERPL-MCNC: 198 MG/DL (ref 200–360)
WBC # SPEC AUTO: 6.2 X10(3)/MCL (ref 4.5–11)

## 2020-08-17 ENCOUNTER — HISTORICAL (OUTPATIENT)
Dept: GASTROENTEROLOGY | Facility: CLINIC | Age: 45
End: 2020-08-17

## 2020-08-20 ENCOUNTER — HISTORICAL (OUTPATIENT)
Dept: ENDOSCOPY | Facility: HOSPITAL | Age: 45
End: 2020-08-20

## 2020-10-07 ENCOUNTER — HISTORICAL (OUTPATIENT)
Dept: HEMATOLOGY/ONCOLOGY | Facility: CLINIC | Age: 45
End: 2020-10-07

## 2020-10-07 ENCOUNTER — HISTORICAL (OUTPATIENT)
Dept: ADMINISTRATIVE | Facility: HOSPITAL | Age: 45
End: 2020-10-07

## 2020-10-07 LAB
ABS NEUT (OLG): 3.75 X10(3)/MCL (ref 2.1–9.2)
ALBUMIN SERPL-MCNC: 3.7 GM/DL (ref 3.4–5)
ALBUMIN/GLOB SERPL: 0.9 RATIO (ref 1.1–2)
ALP SERPL-CCNC: 69 UNIT/L (ref 45–117)
ALT SERPL-CCNC: 26 UNIT/L (ref 12–78)
AST SERPL-CCNC: 24 UNIT/L (ref 15–37)
BASOPHILS # BLD AUTO: 0 X10(3)/MCL (ref 0–0.2)
BASOPHILS NFR BLD AUTO: 1 %
BILIRUB SERPL-MCNC: 0.4 MG/DL (ref 0.2–1)
BUN SERPL-MCNC: 14 MG/DL (ref 7–18)
CALCIUM SERPL-MCNC: 9 MG/DL (ref 8.5–10.1)
CHLORIDE SERPL-SCNC: 107 MMOL/L (ref 98–107)
CO2 SERPL-SCNC: 23 MMOL/L (ref 21–32)
CREAT SERPL-MCNC: 0.8 MG/DL (ref 0.6–1.3)
EOSINOPHIL # BLD AUTO: 0.2 X10(3)/MCL (ref 0–0.9)
EOSINOPHIL NFR BLD AUTO: 2 %
ERYTHROCYTE [DISTWIDTH] IN BLOOD BY AUTOMATED COUNT: 12.8 % (ref 11.5–14.5)
FERRITIN SERPL-MCNC: 10.1 NG/ML (ref 10–150)
GLOBULIN SER-MCNC: 4.2 GM/ML (ref 2.3–3.5)
GLUCOSE SERPL-MCNC: 97 MG/DL (ref 74–106)
HCT VFR BLD AUTO: 42.7 % (ref 35–46)
HGB BLD-MCNC: 14 GM/DL (ref 12–16)
IMM GRANULOCYTES # BLD AUTO: 0.01 10*3/UL
IMM GRANULOCYTES NFR BLD AUTO: 0 %
IRON SATN MFR SERPL: 10.2 % (ref 15–50)
IRON SERPL-MCNC: 38 MCG/DL (ref 50–170)
LYMPHOCYTES # BLD AUTO: 2.3 X10(3)/MCL (ref 0.6–4.6)
LYMPHOCYTES NFR BLD AUTO: 34 %
MCH RBC QN AUTO: 29.3 PG (ref 26–34)
MCHC RBC AUTO-ENTMCNC: 32.8 GM/DL (ref 31–37)
MCV RBC AUTO: 89.3 FL (ref 80–100)
MONOCYTES # BLD AUTO: 0.5 X10(3)/MCL (ref 0.1–1.3)
MONOCYTES NFR BLD AUTO: 7 %
NEUTROPHILS # BLD AUTO: 3.75 X10(3)/MCL (ref 2.1–9.2)
NEUTROPHILS NFR BLD AUTO: 56 %
PLATELET # BLD AUTO: 290 X10(3)/MCL (ref 130–400)
PMV BLD AUTO: 11.2 FL (ref 7.4–10.4)
POTASSIUM SERPL-SCNC: 4.5 MMOL/L (ref 3.5–5.1)
PROT SERPL-MCNC: 7.9 GM/DL (ref 6.4–8.2)
RBC # BLD AUTO: 4.78 X10(6)/MCL (ref 4–5.2)
SODIUM SERPL-SCNC: 138 MMOL/L (ref 136–145)
TIBC SERPL-MCNC: 374 MCG/DL (ref 250–450)
TRANSFERRIN SERPL-MCNC: 279 MG/DL (ref 200–360)
WBC # SPEC AUTO: 6.7 X10(3)/MCL (ref 4.5–11)

## 2020-11-04 ENCOUNTER — HISTORICAL (OUTPATIENT)
Dept: INTERNAL MEDICINE | Facility: CLINIC | Age: 45
End: 2020-11-04

## 2020-11-04 LAB
APPEARANCE, UA: ABNORMAL
BACTERIA #/AREA URNS AUTO: ABNORMAL /HPF
BILIRUB UR QL STRIP: NEGATIVE
BUN SERPL-MCNC: 14 MG/DL (ref 7–18.7)
CALCIUM SERPL-MCNC: 9.2 MG/DL (ref 8.4–10.2)
CHLORIDE SERPL-SCNC: 104 MMOL/L (ref 98–107)
CHOLEST SERPL-MCNC: 230 MG/DL
CHOLEST/HDLC SERPL: 5 {RATIO} (ref 0–5)
CO2 SERPL-SCNC: 27 MMOL/L (ref 22–29)
COLOR UR: YELLOW
CREAT SERPL-MCNC: 0.71 MG/DL (ref 0.55–1.02)
CREAT UR-MCNC: 204.5 MG/DL (ref 45–106)
CREAT/UREA NIT SERPL: 20
EST. AVERAGE GLUCOSE BLD GHB EST-MCNC: 105.4 MG/DL
GLUCOSE (UA): NEGATIVE
GLUCOSE SERPL-MCNC: 110 MG/DL (ref 74–100)
HAV IGM SERPL QL IA: NONREACTIVE
HBA1C MFR BLD: 5.3 %
HBV CORE IGM SERPL QL IA: NONREACTIVE
HBV SURFACE AG SERPL QL IA: NONREACTIVE
HCV AB SERPL QL IA: NONREACTIVE
HDLC SERPL-MCNC: 42 MG/DL (ref 35–60)
HGB UR QL STRIP: 0.03 MG/DL
HIV 1+2 AB+HIV1 P24 AG SERPL QL IA: NONREACTIVE
HYALINE CASTS #/AREA URNS LPF: ABNORMAL /LPF
KETONES UR QL STRIP: ABNORMAL
LDLC SERPL CALC-MCNC: 114 MG/DL (ref 50–140)
LEUKOCYTE ESTERASE UR QL STRIP: NEGATIVE
MICROALBUMIN UR-MCNC: <50 UG/ML
MICROALBUMIN/CREAT RATIO PNL UR: <244.5 MG/GM CR (ref 0–30)
NITRITE UR QL STRIP: NEGATIVE
PH UR STRIP: 6 [PH] (ref 4.5–8)
POTASSIUM SERPL-SCNC: 4.5 MMOL/L (ref 3.5–5.1)
PROT UR QL STRIP: 10 MG/DL
RBC #/AREA URNS AUTO: ABNORMAL /HPF
SODIUM SERPL-SCNC: 138 MMOL/L (ref 136–145)
SP GR UR STRIP: 1.03 (ref 1–1.03)
SQUAMOUS #/AREA URNS LPF: >100 /LPF
TRIGL SERPL-MCNC: 368 MG/DL (ref 37–140)
TSH SERPL-ACNC: 0.73 UIU/ML (ref 0.35–4.94)
UROBILINOGEN UR STRIP-ACNC: 2 MG/DL
VLDLC SERPL CALC-MCNC: 74 MG/DL
WBC #/AREA URNS AUTO: ABNORMAL /HPF

## 2020-11-06 LAB — FINAL CULTURE: NO GROWTH

## 2020-11-24 ENCOUNTER — HISTORICAL (OUTPATIENT)
Dept: RADIOLOGY | Facility: HOSPITAL | Age: 45
End: 2020-11-24

## 2020-12-07 ENCOUNTER — HISTORICAL (OUTPATIENT)
Dept: RADIOLOGY | Facility: HOSPITAL | Age: 45
End: 2020-12-07

## 2020-12-07 LAB
ABS NEUT (OLG): 4 X10(3)/MCL (ref 2.1–9.2)
ALBUMIN SERPL-MCNC: 4.1 GM/DL (ref 3.5–5)
ALBUMIN/GLOB SERPL: 1 RATIO (ref 1.1–2)
ALP SERPL-CCNC: 73 UNIT/L (ref 40–150)
ALT SERPL-CCNC: 14 UNIT/L (ref 0–55)
AST SERPL-CCNC: 22 UNIT/L (ref 5–34)
BASOPHILS # BLD AUTO: 0.1 X10(3)/MCL (ref 0–0.2)
BASOPHILS NFR BLD AUTO: 1 %
BILIRUB SERPL-MCNC: 0.4 MG/DL
BILIRUBIN DIRECT+TOT PNL SERPL-MCNC: 0.1 MG/DL (ref 0–0.5)
BILIRUBIN DIRECT+TOT PNL SERPL-MCNC: 0.3 MG/DL (ref 0–0.8)
BUN SERPL-MCNC: 15 MG/DL (ref 7–18.7)
CALCIUM SERPL-MCNC: 9.2 MG/DL (ref 8.4–10.2)
CHLORIDE SERPL-SCNC: 107 MMOL/L (ref 98–107)
CO2 SERPL-SCNC: 24 MMOL/L (ref 22–29)
CREAT SERPL-MCNC: 0.69 MG/DL (ref 0.55–1.02)
EOSINOPHIL # BLD AUTO: 0.1 X10(3)/MCL (ref 0–0.9)
EOSINOPHIL NFR BLD AUTO: 2 %
ERYTHROCYTE [DISTWIDTH] IN BLOOD BY AUTOMATED COUNT: 12.9 % (ref 11.5–14.5)
FERRITIN SERPL-MCNC: 11.9 NG/ML (ref 4.63–204)
GLOBULIN SER-MCNC: 4 GM/DL (ref 2.4–3.5)
GLUCOSE SERPL-MCNC: 86 MG/DL (ref 74–100)
HCT VFR BLD AUTO: 42.7 % (ref 35–46)
HGB BLD-MCNC: 13.4 GM/DL (ref 12–16)
IMM GRANULOCYTES # BLD AUTO: 0.01 10*3/UL
IMM GRANULOCYTES NFR BLD AUTO: 0 %
IRON SATN MFR SERPL: 7 % (ref 20–50)
IRON SERPL-MCNC: 24 UG/DL (ref 50–170)
LYMPHOCYTES # BLD AUTO: 2.4 X10(3)/MCL (ref 0.6–4.6)
LYMPHOCYTES NFR BLD AUTO: 33 %
MCH RBC QN AUTO: 27.3 PG (ref 26–34)
MCHC RBC AUTO-ENTMCNC: 31.4 GM/DL (ref 31–37)
MCV RBC AUTO: 87 FL (ref 80–100)
MONOCYTES # BLD AUTO: 0.7 X10(3)/MCL (ref 0.1–1.3)
MONOCYTES NFR BLD AUTO: 9 %
NEUTROPHILS # BLD AUTO: 4 X10(3)/MCL (ref 2.1–9.2)
NEUTROPHILS NFR BLD AUTO: 55 %
PLATELET # BLD AUTO: 356 X10(3)/MCL (ref 130–400)
PMV BLD AUTO: 10.6 FL (ref 7.4–10.4)
POTASSIUM SERPL-SCNC: 4.5 MMOL/L (ref 3.5–5.1)
PROT SERPL-MCNC: 8.1 GM/DL (ref 6.4–8.3)
RBC # BLD AUTO: 4.91 X10(6)/MCL (ref 4–5.2)
SODIUM SERPL-SCNC: 140 MMOL/L (ref 136–145)
TIBC SERPL-MCNC: 335 UG/DL (ref 70–310)
TIBC SERPL-MCNC: 359 UG/DL (ref 250–450)
TRANSFERRIN SERPL-MCNC: 340 MG/DL (ref 180–382)
WBC # SPEC AUTO: 7.3 X10(3)/MCL (ref 4.5–11)

## 2020-12-16 ENCOUNTER — HISTORICAL (OUTPATIENT)
Dept: RADIOLOGY | Facility: HOSPITAL | Age: 45
End: 2020-12-16

## 2020-12-22 ENCOUNTER — HISTORICAL (OUTPATIENT)
Dept: INFUSION THERAPY | Facility: HOSPITAL | Age: 45
End: 2020-12-22

## 2020-12-31 ENCOUNTER — HISTORICAL (OUTPATIENT)
Dept: INFUSION THERAPY | Facility: HOSPITAL | Age: 45
End: 2020-12-31

## 2021-01-20 ENCOUNTER — HISTORICAL (OUTPATIENT)
Dept: HEMATOLOGY/ONCOLOGY | Facility: CLINIC | Age: 46
End: 2021-01-20

## 2021-01-20 LAB
ABS NEUT (OLG): 4.2 X10(3)/MCL (ref 2.1–9.2)
ALBUMIN SERPL-MCNC: 3.9 GM/DL (ref 3.5–5)
ALBUMIN/GLOB SERPL: 1.2 RATIO (ref 1.1–2)
ALP SERPL-CCNC: 73 UNIT/L (ref 40–150)
ALT SERPL-CCNC: 15 UNIT/L (ref 0–55)
AST SERPL-CCNC: 20 UNIT/L (ref 5–34)
BASOPHILS # BLD AUTO: 0 X10(3)/MCL (ref 0–0.2)
BASOPHILS NFR BLD AUTO: 1 %
BILIRUB SERPL-MCNC: 0.3 MG/DL
BILIRUBIN DIRECT+TOT PNL SERPL-MCNC: 0.1 MG/DL (ref 0–0.5)
BILIRUBIN DIRECT+TOT PNL SERPL-MCNC: 0.2 MG/DL (ref 0–0.8)
BUN SERPL-MCNC: 13 MG/DL (ref 7–18.7)
CALCIUM SERPL-MCNC: 9.1 MG/DL (ref 8.4–10.2)
CHLORIDE SERPL-SCNC: 104 MMOL/L (ref 98–107)
CO2 SERPL-SCNC: 28 MMOL/L (ref 22–29)
CREAT SERPL-MCNC: 0.63 MG/DL (ref 0.55–1.02)
EOSINOPHIL # BLD AUTO: 0.2 X10(3)/MCL (ref 0–0.9)
EOSINOPHIL NFR BLD AUTO: 2 %
ERYTHROCYTE [DISTWIDTH] IN BLOOD BY AUTOMATED COUNT: 16.3 % (ref 11.5–14.5)
FERRITIN SERPL-MCNC: 415.63 NG/ML (ref 4.63–204)
GLOBULIN SER-MCNC: 3.3 GM/DL (ref 2.4–3.5)
GLUCOSE SERPL-MCNC: 96 MG/DL (ref 74–100)
HCT VFR BLD AUTO: 42.9 % (ref 35–46)
HGB BLD-MCNC: 13.7 GM/DL (ref 12–16)
IMM GRANULOCYTES # BLD AUTO: 0.02 10*3/UL
IMM GRANULOCYTES NFR BLD AUTO: 0 %
IRON SATN MFR SERPL: 41 % (ref 20–50)
IRON SERPL-MCNC: 95 UG/DL (ref 50–170)
LYMPHOCYTES # BLD AUTO: 1.9 X10(3)/MCL (ref 0.6–4.6)
LYMPHOCYTES NFR BLD AUTO: 27 %
MCH RBC QN AUTO: 28.5 PG (ref 26–34)
MCHC RBC AUTO-ENTMCNC: 31.9 GM/DL (ref 31–37)
MCV RBC AUTO: 89.2 FL (ref 80–100)
MONOCYTES # BLD AUTO: 0.5 X10(3)/MCL (ref 0.1–1.3)
MONOCYTES NFR BLD AUTO: 8 %
NEUTROPHILS # BLD AUTO: 4.2 X10(3)/MCL (ref 2.1–9.2)
NEUTROPHILS NFR BLD AUTO: 62 %
PLATELET # BLD AUTO: 260 X10(3)/MCL (ref 130–400)
PMV BLD AUTO: 11.2 FL (ref 7.4–10.4)
POTASSIUM SERPL-SCNC: 4.1 MMOL/L (ref 3.5–5.1)
PROT SERPL-MCNC: 7.2 GM/DL (ref 6.4–8.3)
RBC # BLD AUTO: 4.81 X10(6)/MCL (ref 4–5.2)
SODIUM SERPL-SCNC: 139 MMOL/L (ref 136–145)
TIBC SERPL-MCNC: 134 UG/DL (ref 70–310)
TIBC SERPL-MCNC: 229 UG/DL (ref 250–450)
TRANSFERRIN SERPL-MCNC: 203 MG/DL (ref 180–382)
WBC # SPEC AUTO: 6.8 X10(3)/MCL (ref 4.5–11)

## 2021-02-05 ENCOUNTER — HISTORICAL (OUTPATIENT)
Dept: HEMATOLOGY/ONCOLOGY | Facility: CLINIC | Age: 46
End: 2021-02-05

## 2021-02-05 LAB
ABS NEUT (OLG): 3.04 X10(3)/MCL (ref 2.1–9.2)
ALBUMIN SERPL-MCNC: 4.2 GM/DL (ref 3.5–5)
ALBUMIN/GLOB SERPL: 1.1 RATIO (ref 1.1–2)
ALP SERPL-CCNC: 74 UNIT/L (ref 40–150)
ALT SERPL-CCNC: 22 UNIT/L (ref 0–55)
AST SERPL-CCNC: 24 UNIT/L (ref 5–34)
BASOPHILS # BLD AUTO: 0 X10(3)/MCL (ref 0–0.2)
BASOPHILS NFR BLD AUTO: 1 %
BILIRUB SERPL-MCNC: 0.4 MG/DL
BILIRUBIN DIRECT+TOT PNL SERPL-MCNC: 0.1 MG/DL (ref 0–0.5)
BILIRUBIN DIRECT+TOT PNL SERPL-MCNC: 0.3 MG/DL (ref 0–0.8)
BUN SERPL-MCNC: 16 MG/DL (ref 7–18.7)
CALCIUM SERPL-MCNC: 9.8 MG/DL (ref 8.4–10.2)
CHLORIDE SERPL-SCNC: 105 MMOL/L (ref 98–107)
CO2 SERPL-SCNC: 26 MMOL/L (ref 22–29)
CREAT SERPL-MCNC: 0.71 MG/DL (ref 0.55–1.02)
EOSINOPHIL # BLD AUTO: 0.1 X10(3)/MCL (ref 0–0.9)
EOSINOPHIL NFR BLD AUTO: 2 %
ERYTHROCYTE [DISTWIDTH] IN BLOOD BY AUTOMATED COUNT: 16.3 % (ref 11.5–14.5)
FERRITIN SERPL-MCNC: 263.39 NG/ML (ref 4.63–204)
GLOBULIN SER-MCNC: 3.7 GM/DL (ref 2.4–3.5)
GLUCOSE SERPL-MCNC: 86 MG/DL (ref 74–100)
HCT VFR BLD AUTO: 42.6 % (ref 35–46)
HGB BLD-MCNC: 14 GM/DL (ref 12–16)
IMM GRANULOCYTES # BLD AUTO: 0.01 10*3/UL
IMM GRANULOCYTES NFR BLD AUTO: 0 %
IRON SATN MFR SERPL: 42 % (ref 20–50)
IRON SERPL-MCNC: 110 UG/DL (ref 50–170)
LYMPHOCYTES # BLD AUTO: 1.9 X10(3)/MCL (ref 0.6–4.6)
LYMPHOCYTES NFR BLD AUTO: 33 %
MCH RBC QN AUTO: 28.7 PG (ref 26–34)
MCHC RBC AUTO-ENTMCNC: 32.9 GM/DL (ref 31–37)
MCV RBC AUTO: 87.3 FL (ref 80–100)
MONOCYTES # BLD AUTO: 0.7 X10(3)/MCL (ref 0.1–1.3)
MONOCYTES NFR BLD AUTO: 11 %
NEUTROPHILS # BLD AUTO: 3.04 X10(3)/MCL (ref 2.1–9.2)
NEUTROPHILS NFR BLD AUTO: 53 %
PLATELET # BLD AUTO: 243 X10(3)/MCL (ref 130–400)
PMV BLD AUTO: 11.5 FL (ref 7.4–10.4)
POTASSIUM SERPL-SCNC: 4.1 MMOL/L (ref 3.5–5.1)
PROT SERPL-MCNC: 7.9 GM/DL (ref 6.4–8.3)
RBC # BLD AUTO: 4.88 X10(6)/MCL (ref 4–5.2)
SODIUM SERPL-SCNC: 140 MMOL/L (ref 136–145)
TIBC SERPL-MCNC: 153 UG/DL (ref 70–310)
TIBC SERPL-MCNC: 263 UG/DL (ref 250–450)
TRANSFERRIN SERPL-MCNC: 221 MG/DL (ref 180–382)
WBC # SPEC AUTO: 5.8 X10(3)/MCL (ref 4.5–11)

## 2021-03-17 ENCOUNTER — HISTORICAL (OUTPATIENT)
Dept: INTERNAL MEDICINE | Facility: CLINIC | Age: 46
End: 2021-03-17

## 2021-03-17 LAB
ABS NEUT (OLG): 2.52 X10(3)/MCL (ref 2.1–9.2)
ALBUMIN SERPL-MCNC: 4.2 GM/DL (ref 3.5–5)
ALBUMIN/GLOB SERPL: 1.1 RATIO (ref 1.1–2)
ALP SERPL-CCNC: 83 UNIT/L (ref 40–150)
ALT SERPL-CCNC: 20 UNIT/L (ref 0–55)
AST SERPL-CCNC: 26 UNIT/L (ref 5–34)
BASOPHILS # BLD AUTO: 0 X10(3)/MCL (ref 0–0.2)
BASOPHILS NFR BLD AUTO: 1 %
BILIRUB SERPL-MCNC: 0.8 MG/DL
BILIRUBIN DIRECT+TOT PNL SERPL-MCNC: 0.3 MG/DL (ref 0–0.5)
BILIRUBIN DIRECT+TOT PNL SERPL-MCNC: 0.5 MG/DL (ref 0–0.8)
BUN SERPL-MCNC: 9.4 MG/DL (ref 7–18.7)
CALCIUM SERPL-MCNC: 9.4 MG/DL (ref 8.4–10.2)
CHLORIDE SERPL-SCNC: 105 MMOL/L (ref 98–107)
CHOLEST SERPL-MCNC: 155 MG/DL
CHOLEST/HDLC SERPL: 4 {RATIO} (ref 0–5)
CO2 SERPL-SCNC: 24 MMOL/L (ref 22–29)
CREAT SERPL-MCNC: 0.74 MG/DL (ref 0.55–1.02)
CREAT UR-MCNC: 315.8 MG/DL (ref 45–106)
EOSINOPHIL # BLD AUTO: 0.1 X10(3)/MCL (ref 0–0.9)
EOSINOPHIL NFR BLD AUTO: 2 %
ERYTHROCYTE [DISTWIDTH] IN BLOOD BY AUTOMATED COUNT: 15.3 % (ref 11.5–14.5)
GLOBULIN SER-MCNC: 3.9 GM/DL (ref 2.4–3.5)
GLUCOSE SERPL-MCNC: 85 MG/DL (ref 74–100)
HCT VFR BLD AUTO: 47.3 % (ref 35–46)
HDLC SERPL-MCNC: 44 MG/DL (ref 35–60)
HGB BLD-MCNC: 15.5 GM/DL (ref 12–16)
IMM GRANULOCYTES # BLD AUTO: 0.01 10*3/UL
IMM GRANULOCYTES NFR BLD AUTO: 0 %
LDLC SERPL CALC-MCNC: 78 MG/DL (ref 50–140)
LYMPHOCYTES # BLD AUTO: 1.8 X10(3)/MCL (ref 0.6–4.6)
LYMPHOCYTES NFR BLD AUTO: 34 %
MCH RBC QN AUTO: 29.8 PG (ref 26–34)
MCHC RBC AUTO-ENTMCNC: 32.8 GM/DL (ref 31–37)
MCV RBC AUTO: 90.8 FL (ref 80–100)
MICROALBUMIN UR-MCNC: 20.8 MG/L
MICROALBUMIN/CREAT RATIO PNL UR: 6.6 MG/GM CR (ref 0–30)
MONOCYTES # BLD AUTO: 0.7 X10(3)/MCL (ref 0.1–1.3)
MONOCYTES NFR BLD AUTO: 14 %
NEUTROPHILS # BLD AUTO: 2.52 X10(3)/MCL (ref 2.1–9.2)
NEUTROPHILS NFR BLD AUTO: 49 %
PLATELET # BLD AUTO: 266 X10(3)/MCL (ref 130–400)
PMV BLD AUTO: 11.3 FL (ref 7.4–10.4)
POTASSIUM SERPL-SCNC: 4 MMOL/L (ref 3.5–5.1)
PROT SERPL-MCNC: 8.1 GM/DL (ref 6.4–8.3)
RBC # BLD AUTO: 5.21 X10(6)/MCL (ref 4–5.2)
SODIUM SERPL-SCNC: 138 MMOL/L (ref 136–145)
TRIGL SERPL-MCNC: 164 MG/DL (ref 37–140)
VLDLC SERPL CALC-MCNC: 33 MG/DL
WBC # SPEC AUTO: 5.2 X10(3)/MCL (ref 4.5–11)

## 2021-04-09 ENCOUNTER — HISTORICAL (OUTPATIENT)
Dept: HEMATOLOGY/ONCOLOGY | Facility: CLINIC | Age: 46
End: 2021-04-09

## 2021-04-09 LAB
ABS NEUT (OLG): 5.21 X10(3)/MCL (ref 2.1–9.2)
ALBUMIN SERPL-MCNC: 3.9 GM/DL (ref 3.5–5)
ALBUMIN/GLOB SERPL: 1.1 RATIO (ref 1.1–2)
ALP SERPL-CCNC: 70 UNIT/L (ref 40–150)
ALT SERPL-CCNC: 13 UNIT/L (ref 0–55)
AST SERPL-CCNC: 20 UNIT/L (ref 5–34)
BASOPHILS # BLD AUTO: 0.1 X10(3)/MCL (ref 0–0.2)
BASOPHILS NFR BLD AUTO: 1 %
BILIRUB SERPL-MCNC: 0.5 MG/DL
BILIRUBIN DIRECT+TOT PNL SERPL-MCNC: 0.2 MG/DL (ref 0–0.5)
BILIRUBIN DIRECT+TOT PNL SERPL-MCNC: 0.3 MG/DL (ref 0–0.8)
BUN SERPL-MCNC: 13 MG/DL (ref 7–18.7)
CALCIUM SERPL-MCNC: 9.4 MG/DL (ref 8.4–10.2)
CHLORIDE SERPL-SCNC: 104 MMOL/L (ref 98–107)
CO2 SERPL-SCNC: 25 MMOL/L (ref 22–29)
CREAT SERPL-MCNC: 0.7 MG/DL (ref 0.55–1.02)
EOSINOPHIL # BLD AUTO: 0.1 X10(3)/MCL (ref 0–0.9)
EOSINOPHIL NFR BLD AUTO: 2 %
ERYTHROCYTE [DISTWIDTH] IN BLOOD BY AUTOMATED COUNT: 13.2 % (ref 11.5–14.5)
FERRITIN SERPL-MCNC: 46.32 NG/ML (ref 4.63–204)
GLOBULIN SER-MCNC: 3.5 GM/DL (ref 2.4–3.5)
GLUCOSE SERPL-MCNC: 116 MG/DL (ref 74–100)
HCT VFR BLD AUTO: 43.9 % (ref 35–46)
HGB BLD-MCNC: 14.9 GM/DL (ref 12–16)
IMM GRANULOCYTES # BLD AUTO: 0.02 10*3/UL
IMM GRANULOCYTES NFR BLD AUTO: 0 %
IRON SATN MFR SERPL: 25 % (ref 20–50)
IRON SERPL-MCNC: 67 UG/DL (ref 50–170)
LYMPHOCYTES # BLD AUTO: 2.4 X10(3)/MCL (ref 0.6–4.6)
LYMPHOCYTES NFR BLD AUTO: 29 %
MCH RBC QN AUTO: 30.8 PG (ref 26–34)
MCHC RBC AUTO-ENTMCNC: 33.9 GM/DL (ref 31–37)
MCV RBC AUTO: 90.9 FL (ref 80–100)
MONOCYTES # BLD AUTO: 0.4 X10(3)/MCL (ref 0.1–1.3)
MONOCYTES NFR BLD AUTO: 5 %
NEUTROPHILS # BLD AUTO: 5.21 X10(3)/MCL (ref 2.1–9.2)
NEUTROPHILS NFR BLD AUTO: 64 %
PLATELET # BLD AUTO: 269 X10(3)/MCL (ref 130–400)
PMV BLD AUTO: 11.4 FL (ref 7.4–10.4)
POTASSIUM SERPL-SCNC: 4.3 MMOL/L (ref 3.5–5.1)
PROT SERPL-MCNC: 7.4 GM/DL
PROT SERPL-MCNC: 7.4 GM/DL (ref 6.4–8.3)
RBC # BLD AUTO: 4.83 X10(6)/MCL (ref 4–5.2)
SODIUM SERPL-SCNC: 137 MMOL/L (ref 136–145)
TIBC SERPL-MCNC: 198 UG/DL (ref 70–310)
TIBC SERPL-MCNC: 265 UG/DL (ref 250–450)
TRANSFERRIN SERPL-MCNC: 231 MG/DL (ref 180–382)
WBC # SPEC AUTO: 8.2 X10(3)/MCL (ref 4.5–11)

## 2021-06-16 ENCOUNTER — HISTORICAL (OUTPATIENT)
Dept: HEMATOLOGY/ONCOLOGY | Facility: CLINIC | Age: 46
End: 2021-06-16

## 2021-06-16 LAB
ABS NEUT (OLG): 4 X10(3)/MCL (ref 2.1–9.2)
ALBUMIN SERPL-MCNC: 3.7 GM/DL (ref 3.5–5)
ALBUMIN/GLOB SERPL: 1 RATIO (ref 1.1–2)
ALP SERPL-CCNC: 80 UNIT/L (ref 40–150)
ALT SERPL-CCNC: 16 UNIT/L (ref 0–55)
AST SERPL-CCNC: 24 UNIT/L (ref 5–34)
BASOPHILS # BLD AUTO: 0 X10(3)/MCL (ref 0–0.2)
BASOPHILS NFR BLD AUTO: 1 %
BILIRUB SERPL-MCNC: 0.5 MG/DL
BILIRUBIN DIRECT+TOT PNL SERPL-MCNC: 0.1 MG/DL (ref 0–0.5)
BILIRUBIN DIRECT+TOT PNL SERPL-MCNC: 0.4 MG/DL (ref 0–0.8)
BUN SERPL-MCNC: 13.2 MG/DL (ref 7–18.7)
CALCIUM SERPL-MCNC: 9.2 MG/DL (ref 8.4–10.2)
CHLORIDE SERPL-SCNC: 102 MMOL/L (ref 98–107)
CO2 SERPL-SCNC: 26 MMOL/L (ref 22–29)
CREAT SERPL-MCNC: 0.68 MG/DL (ref 0.55–1.02)
EOSINOPHIL # BLD AUTO: 0.2 X10(3)/MCL (ref 0–0.9)
EOSINOPHIL NFR BLD AUTO: 2 %
ERYTHROCYTE [DISTWIDTH] IN BLOOD BY AUTOMATED COUNT: 12 % (ref 11.5–14.5)
FERRITIN SERPL-MCNC: 17.72 NG/ML (ref 4.63–204)
GLOBULIN SER-MCNC: 3.6 GM/DL (ref 2.4–3.5)
GLUCOSE SERPL-MCNC: 100 MG/DL (ref 74–100)
HCT VFR BLD AUTO: 41.1 % (ref 35–46)
HGB BLD-MCNC: 13.6 GM/DL (ref 12–16)
IMM GRANULOCYTES # BLD AUTO: 0.01 10*3/UL
IMM GRANULOCYTES NFR BLD AUTO: 0 %
IRON SATN MFR SERPL: 26 % (ref 20–50)
IRON SERPL-MCNC: 81 UG/DL (ref 50–170)
LYMPHOCYTES # BLD AUTO: 2.3 X10(3)/MCL (ref 0.6–4.6)
LYMPHOCYTES NFR BLD AUTO: 32 %
MCH RBC QN AUTO: 30.4 PG (ref 26–34)
MCHC RBC AUTO-ENTMCNC: 33.1 GM/DL (ref 31–37)
MCV RBC AUTO: 91.7 FL (ref 80–100)
MONOCYTES # BLD AUTO: 0.6 X10(3)/MCL (ref 0.1–1.3)
MONOCYTES NFR BLD AUTO: 9 %
NEUTROPHILS # BLD AUTO: 4 X10(3)/MCL (ref 2.1–9.2)
NEUTROPHILS NFR BLD AUTO: 56 %
NRBC BLD AUTO-RTO: 0 % (ref 0–0.2)
PLATELET # BLD AUTO: 290 X10(3)/MCL (ref 130–400)
PMV BLD AUTO: 11.2 FL (ref 7.4–10.4)
POTASSIUM SERPL-SCNC: 4 MMOL/L (ref 3.5–5.1)
PROT SERPL-MCNC: 7.3 GM/DL (ref 6.4–8.3)
RBC # BLD AUTO: 4.48 X10(6)/MCL (ref 4–5.2)
SODIUM SERPL-SCNC: 137 MMOL/L (ref 136–145)
TIBC SERPL-MCNC: 236 UG/DL (ref 70–310)
TIBC SERPL-MCNC: 317 UG/DL (ref 250–450)
TRANSFERRIN SERPL-MCNC: 282 MG/DL (ref 180–382)
WBC # SPEC AUTO: 7.1 X10(3)/MCL (ref 4.5–11)

## 2021-11-15 ENCOUNTER — HISTORICAL (OUTPATIENT)
Dept: HEMATOLOGY/ONCOLOGY | Facility: CLINIC | Age: 46
End: 2021-11-15

## 2021-11-15 LAB
ABS NEUT (OLG): 2.35 X10(3)/MCL (ref 2.1–9.2)
ALBUMIN SERPL-MCNC: 3.6 GM/DL (ref 3.5–5)
ALBUMIN/GLOB SERPL: 1.2 RATIO (ref 1.1–2)
ALP SERPL-CCNC: 60 UNIT/L (ref 40–150)
ALT SERPL-CCNC: 12 UNIT/L (ref 0–55)
AST SERPL-CCNC: 17 UNIT/L (ref 5–34)
BASOPHILS # BLD AUTO: 0.1 X10(3)/MCL (ref 0–0.2)
BASOPHILS NFR BLD AUTO: 1 %
BILIRUB SERPL-MCNC: 0.3 MG/DL
BILIRUBIN DIRECT+TOT PNL SERPL-MCNC: 0.1 MG/DL (ref 0–0.8)
BILIRUBIN DIRECT+TOT PNL SERPL-MCNC: 0.2 MG/DL (ref 0–0.5)
BUN SERPL-MCNC: 11.7 MG/DL (ref 7–18.7)
CALCIUM SERPL-MCNC: 8.9 MG/DL (ref 8.7–10.5)
CHLORIDE SERPL-SCNC: 107 MMOL/L (ref 98–107)
CO2 SERPL-SCNC: 25 MMOL/L (ref 22–29)
CREAT SERPL-MCNC: 0.64 MG/DL (ref 0.55–1.02)
EOSINOPHIL # BLD AUTO: 0.1 X10(3)/MCL (ref 0–0.9)
EOSINOPHIL NFR BLD AUTO: 2 %
ERYTHROCYTE [DISTWIDTH] IN BLOOD BY AUTOMATED COUNT: 15.5 % (ref 11.5–14.5)
FERRITIN SERPL-MCNC: 3.76 NG/ML (ref 4.63–204)
GLOBULIN SER-MCNC: 3.1 GM/DL (ref 2.4–3.5)
GLUCOSE SERPL-MCNC: 89 MG/DL (ref 74–100)
HCT VFR BLD AUTO: 33.3 % (ref 35–46)
HGB BLD-MCNC: 9.4 GM/DL (ref 12–16)
IMM GRANULOCYTES # BLD AUTO: 0.02 10*3/UL
IMM GRANULOCYTES NFR BLD AUTO: 0 %
IRON SATN MFR SERPL: 3 % (ref 20–50)
IRON SERPL-MCNC: 10 UG/DL (ref 50–170)
LYMPHOCYTES # BLD AUTO: 1.7 X10(3)/MCL (ref 0.6–4.6)
LYMPHOCYTES NFR BLD AUTO: 36 %
MCH RBC QN AUTO: 22.3 PG (ref 26–34)
MCHC RBC AUTO-ENTMCNC: 28.2 GM/DL (ref 31–37)
MCV RBC AUTO: 78.9 FL (ref 80–100)
MONOCYTES # BLD AUTO: 0.6 X10(3)/MCL (ref 0.1–1.3)
MONOCYTES NFR BLD AUTO: 12 %
NEUTROPHILS # BLD AUTO: 2.35 X10(3)/MCL (ref 2.1–9.2)
NEUTROPHILS NFR BLD AUTO: 48 %
NRBC BLD AUTO-RTO: 0 % (ref 0–0.2)
PLATELET # BLD AUTO: 367 X10(3)/MCL (ref 130–400)
PMV BLD AUTO: 10.4 FL (ref 7.4–10.4)
POTASSIUM SERPL-SCNC: 4.1 MMOL/L (ref 3.5–5.1)
PROT SERPL-MCNC: 6.7 GM/DL (ref 6.4–8.3)
RBC # BLD AUTO: 4.22 X10(6)/MCL (ref 4–5.2)
SODIUM SERPL-SCNC: 137 MMOL/L (ref 136–145)
TIBC SERPL-MCNC: 338 UG/DL (ref 70–310)
TIBC SERPL-MCNC: 348 UG/DL (ref 250–450)
TRANSFERRIN SERPL-MCNC: 314 MG/DL (ref 180–382)
WBC # SPEC AUTO: 4.9 X10(3)/MCL (ref 4.5–11)

## 2021-11-29 ENCOUNTER — HISTORICAL (OUTPATIENT)
Dept: INFUSION THERAPY | Facility: HOSPITAL | Age: 46
End: 2021-11-29

## 2021-12-06 ENCOUNTER — HISTORICAL (OUTPATIENT)
Dept: INFUSION THERAPY | Facility: HOSPITAL | Age: 46
End: 2021-12-06

## 2022-04-11 ENCOUNTER — HISTORICAL (OUTPATIENT)
Dept: ADMINISTRATIVE | Facility: HOSPITAL | Age: 47
End: 2022-04-11
Payer: MEDICAID

## 2022-04-29 VITALS
BODY MASS INDEX: 27.38 KG/M2 | WEIGHT: 158.75 LBS | DIASTOLIC BLOOD PRESSURE: 81 MMHG | BODY MASS INDEX: 28.93 KG/M2 | BODY MASS INDEX: 29.21 KG/M2 | SYSTOLIC BLOOD PRESSURE: 107 MMHG | DIASTOLIC BLOOD PRESSURE: 73 MMHG | WEIGHT: 148.81 LBS | HEIGHT: 62 IN | OXYGEN SATURATION: 97 % | DIASTOLIC BLOOD PRESSURE: 67 MMHG | HEIGHT: 62 IN | WEIGHT: 157.19 LBS | HEIGHT: 62 IN | SYSTOLIC BLOOD PRESSURE: 119 MMHG | SYSTOLIC BLOOD PRESSURE: 96 MMHG

## 2022-04-30 NOTE — H&P
Patient:   Pretty Pratt            MRN: [id number]            FIN: [id number]               Age:   43 years     Sex:  Female     :  1975   Associated Diagnoses:   None   Author:   Nick MULTANI, Austin LEGGETT      Basic Information   Time Seen:  Date & Time 2018 10:40:00.    Source of history:  Self, Medical record.    Present at bedside:  Family member.    Referral source:  Emergency department.    History limitation:  None.    Advance directive:  None.    Provider information/ cc:  Primary care:  Walter [place], Megha Read.       Chief Complaint   facial numbness      History of Present Illness   Ms. Pratt is a 43 yr old  female who presented to the ED with c/o a 3 day history of left facial numbness which has progressed to include her lips as well. Had a headache 4 days ago but this has resolved. 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 This may be a little worse but she is unsure. Denies dysphagia. B/P on arrival was 172/88. Head CT showed nothing acute.  Patient not on aspirin at home      Review of Systems   Except as documented, all other systems reviewed and negative      Health Status   Current medications:  (Selected)   Inpatient Medications  Ordered  Sodium Chloride 0.9% intravenous solution 1,000 mL: 1,000 mL, 1,000 mL, IV, 125 mL/hr, start date 18 1:34:00 CDT  hydrALAZINE 20 mg/mL injectable solution: 10 mg, form: Injection, IV Push, q4hr PRN for hypertension, first dose 18 1:34:00 CDT, SBP >220; DBP >120; (Use only if labetalol fails to maintain blood pressure parameters)  labetalol: 10 mg, form: Soln, IV Push, q10min PRN for hypertension, first dose 18 1:34:00 CDT, SBP > 220 or DBP > 120 over 1-2 minutes, not exceed 300 mg in 24-hours  Pending Complete  midazolam: 2 mg, form: Injection, IV Push, q5min, Order duration: 2 dose(s), first dose 17 7:20:00 CDT, stop date 17 7:29:00 CDT,  (up to 5 mg for moderate anxiety), 30,025  Prescriptions  Prescribed  Aricept 5 mg oral tablet: 5 mg = 1 tab(s), Oral, Once a day (at bedtime), # 90 tab(s), 1 Refill(s), Pharmacy: [facility]/pharmacy #[id number]  Protonix 40 mg ORAL enteric coated tablet: 40 mg = 1 tab(s), Oral, BID, # 60 tab(s), 3 Refill(s), Pharmacy: [facility]/pharmacy #[id number]  Xanax 0.25 mg oral tablet: 0.25 mg = 1 tab(s), Oral, BID, PRN PRN as needed for anxiety, # 24 tab(s), 2 Refill(s)  gabapentin 100 mg oral capsule: 100 mg = 1 cap(s), Oral, BID, # 180 cap(s), 1 Refill(s), Pharmacy: [facility]/pharmacy #[id number]  levetiracetam 500 mg oral tablet: 500 mg = 1 tab(s), Oral, BID, # 180 tab(s), 1 Refill(s), Pharmacy: [facility]/pharmacy #[id number]  metoprolol tartrate 25 mg oral tab: 25 mg = 1 tab(s), Oral, BID, # 60 tab(s), 3 Refill(s), Pharmacy: [facility]/pharmacy #[id number]  Documented Medications  Documented  BUT/APAP/CAF TAB: 1 tab(s), Oral, q4hr  SPIRONOLACT  TAB 50M mg = 1 tab(s), Oral, Daily      Histories     Past Medical History: CVA per CT - 18  Past Surgical History: Cholecystectomy, Breast augmentation,  x 2  Family History: None  Social History:  No alcohol, tobacco or illicit drug use.   Pregnancy/Menstrual History: LMP one week ago. Describes cycles as heavy with clots. G2, P2, A0       Physical Examination      Vital Signs (last 24 hrs)_____  Last Charted___________  Temp Oral     36.4 DegC  ( 08:)  Heart Rate Peripheral   L 57bpm  (:)  Resp Rate         18 br/min  (:16)  SBP      100 mmHg  (:)  DBP      66 mmHg  (:)  SpO2      100 %  ()  Weight      62.1 kg  (APR 06 01:16)  Height      157.5 cm  ( 01:25)     General:  Alert and oriented, No acute distress.    Cognition and Speech:  Oriented, Speech clear and coherent.    HENT:  Normocephalic, Normal hearing, Oral mucosa is moist.    Eye:  Pupils are equal, round and reactive to light, Normal conjunctiva.    Neck:  Supple, No carotid bruit, No  jugular venous distention.    Respiratory:  Lungs are clear to auscultation, Respirations are non-labored, Breath sounds are equal.    Cardiovascular:  Normal rate, Regular rhythm, No murmur, No edema.    Gastrointestinal:  Soft, Non-tender, Non-distended, Normal bowel sounds.    Integumentary:  Warm, Dry, Intact.    Musculoskeletal:  Normal strength, No tenderness, No swelling.    Neurologic:  Alert, Oriented, Normal sensory, left upper and lower extremity weakness noted.    Psychiatric:  Cooperative, Appropriate mood & affect, Normal judgment.       Review / Management   Laboratory Results   Today's Lab Results : PowerNote Discrete Results   4/6/2018 3:05 CDT        Hgb A1c                   5.2 %                             CRP High Sens             <0.16 mg/L                             Chol                      168 mg/dL                             HDL                       52 mg/dL                             Trig                      80 mg/dL                             LDL                       100 mg/dL                             Total CK                  63 unit/L                             Troponin-I                <0.02 ng/mL    4/5/2018 19:26 CDT       WBC                       6.6 x10(3)/mcL                             Hgb                       9.4 gm/dL  LOW                             Hct                       32.9 %  LOW                             Platelet                  401 x10(3)/mcL  HI                             MCV                       73.6 fL  LOW                             MCH                       21.0 pg  LOW                             PT                        12.7 second(s)                             INR                       0.93                             PTT                       34.4 second(s)                             Sodium Lvl                138 mmol/L                             Potassium Lvl             3.6 mmol/L                             Chloride                   104 mmol/L                             CO2                       27.0 mmol/L                             Calcium Lvl               8.6 mg/dL                             Glucose Lvl               76 mg/dL                             BUN                       12.0 mg/dL                             Creatinine                0.70 mg/dL                             Bili Total                0.2 mg/dL                             Bili Direct               0.10 mg/dL                             Bili Indirect             0.10 mg/dL                             AST                       23 unit/L                             ALT                       19 unit/L                             Albumin Lvl               3.70 gm/dL        04/05/18 HEAD CT:  Remote infarct with associated encephalomalacia and gliosis on the  left parietal lobe at the convexity. No acute ischemic changes or new  infarct. No acute parenchymal hemorrhage or contusion. No mass, mass  effect, midline shift, edema, or extra-axial fluid collection. The  gray-white matter differentiation is maintained. The cerebral sulci  and basal cisterns are normal. No hydrocephalus.     The globes are intact. The paranasal sinuses and mastoid air cells are  well pneumatized.     IMPRESSION: No acute intracranial abnormality. Remote infarct of the left parietal lobe.         Impression and Plan     Facial numbness  - with history of hemorrhagic CVA.  - Head CT negative for acute findings  - MRI head without contrast on 4/6/2018= old stable appearing left parietal lobe hemorrhage with diminishing enhancement (patient has seen neurosurgery in the past for this and no surgical intervention was deemed necessary at that time)  - ECHO = pending  - carotid US= pending  - PT OT ST  - NPO until passes swallow eval  - NS at 125  - Neuro consulted  - Repeat CT head without contrast on 4/7/2018= pending    Anemia, microcytic  - likely secondary to menorrhagia  - Outpatient follow-up with  GYN    scd  ppi      I, Mica Onofre NP, discussed this case with

## 2022-04-30 NOTE — ED PROVIDER NOTES
Patient:   Pretty Pratt            MRN: [id number]            FIN: [id number]               Age:   44 years     Sex:  Female     :  1975   Associated Diagnoses:   Symptomatic anemia   Author:   Humble Pham      Basic Information   Time seen: Date & time 2019 18:20:00, Immediately upon arrival.   History source: Patient.   Arrival mode: Private vehicle.   History limitation: None.   Additional information: Chief Complaint from Nursing Triage Note : Chief Complaint   2019 17:32 CDT      Chief Complaint           pt reports having fatigue and generalized weakness, blood work from two weeks ago revealed anemia. Pt denies bleeding. denies pain. AAOx4. Resp even unlabored. NAD  .   Provider/Visit info:   Time Seen:  Ramos Hernandez MD / 2019 17:47  .   History of Present Illness   The patient presents with anemia.  The onset was chronic.  Lab test value Hgb: 7.7 gm/dl, Lab test was performed by: primary care physician Patient was notified of lab results by: doctor's office.  Risk factors consist of history of anemia.  Prior episodes: chronic.  Therapy today: none.  Associated symptoms: fatigue, abdominal pain, denies blood in stool, denies syncope, denies bleeding, denies nausea, denies vomiting, denies fever and denies chills.  43 y/o F PMHx of iron deficiency anemia, PUD, CVA presents to the ED c/o worsening weakness/fatigue. Pt was called by her PCP due to Hbg of 7.7 and advised to come into the ED for evaluation. .        Review of Systems   Constitutional symptoms:  No fever, no chills, no weakness, no fatigue.    Eye symptoms:  No pain, no blurred vision.    ENMT symptoms:  No ear pain, no nasal congestion.    Respiratory symptoms:  No shortness of breath, no cough.    Cardiovascular symptoms:  No chest pain, no syncope, no peripheral edema.    Gastrointestinal symptoms:  Abdominal pain, mild, epigastric, no nausea, no vomiting, no diarrhea.    Genitourinary  symptoms:  No dysuria, no hematuria.    Musculoskeletal symptoms:  No back pain, no Muscle pain.    Neurologic symptoms:  No headache, no dizziness, no numbness, no tingling.    Psychiatric symptoms:  No anxiety, no depression.    Endocrine symptoms:  No polyuria, no polydipsia.    Hematologic/Lymphatic symptoms:  Bleeding tendency negative, bruising tendency negative.              Additional review of systems information: All other systems reviewed and otherwise negative.      Health Status   Allergies:    Allergic Reactions (Selected)  No Known Allergies,    Allergies (1) Active Reaction  No Known Allergies None Documented.   Medications:  (Selected)   Inpatient Medications  Pending Complete  midazolam: 2 mg, form: Injection, IV Push, q5min, Order duration: 2 dose(s), first dose 04/20/17 7:20:00 CDT, stop date 04/20/17 7:29:00 CDT, (up to 5 mg for moderate anxiety), 30,025  Prescriptions  Prescribed  Anusol Plus 1%-0.5% topical ointment: See Instructions, Apply to affected area BID, # 1 tube(s), 0 Refill(s)  Anusol-HC 2.5% rectal cream with applicator: 1 leydi, [place], BID, # 30 gm, 0 Refill(s), Pharmacy: [facility]/pharmacy #[id number]  Aricept 5 mg oral tablet: 5 mg = 1 tab(s), Oral, Once a day (at bedtime), # 90 tab(s), 1 Refill(s), Pharmacy: [facility]/pharmacy #[id number]  Claritin 10 mg oral tablet: 10 mg = 1 tab(s), Oral, Daily, # 90 tab(s), 1 Refill(s), Pharmacy: [facility]/pharmacy #[id number]  Dulcolax Laxative 5 mg oral delayed release tablet: 5 mg = 1 tab(s), Oral, Daily, PRN PRN as needed for constipation, # 20 tab(s), 1 Refill(s), Pharmacy: [facility]/pharmacy #[id number]  Paxil 10 mg oral tablet: 10 mg = 1 tab(s), Oral, Daily, # 90 tab(s), 1 Refill(s), Pharmacy: [facility]/pharmacy #[id number]  acetaminophen/butalbital/caffeine 325 mg-50 mg-40 mg oral tablet: 1 tab(s), Oral, q4hr, # 5 tab(s), 1 Refill(s), Pharmacy: [facility]/pharmacy #[id number]  fluticasone 50 mcg/inh nasal spray: 1 spray(s), Nasal, Daily, # 16 gm, 4 Refill(s), Pharmacy: [facility]/pharmacy #[id number]  metoprolol tartrate 25  mg oral tab: 25 mg = 1 tab(s), Oral, BID, # 180 tab(s), 1 Refill(s), [id number]  mometasone 0.1% topical ointment: See Instructions, APPLY A THIN FILM DAILY, # 45 gm, eRx: [facility]/pharmacy #[id number]  pantoprazole 40 mg ORAL GRANULE enteric coated: 40 mg = 1 EA, Oral, BID, # 180 tab(s), 0 Refill(s), Pharmacy: [facility]/pharmacy #5443  polyethylene glycol 3350 oral powder for reconstitution: 17 gm, Oral, BID, dissolve in water before taking, # 527 gm, 1 Refill(s), [id number]  spironolactone 50 mg oral tablet: 50 mg = 1 tab(s), Oral, Daily, # 30 tab(s), 0 Refill(s), [id number]  Documented Medications  Documented  MoviPrep oral powder for reconstitution:   Pantoprazole 40 mg ORAL EC-Tablet: 40 mg = 1 tab(s), Oral, BID  aspirin 81 mg oral tablet, chewable:   latanoprost 0.005% ophthalmic solution: , per nurse's notes.      Past Medical/ Family/ Social History   Medical history:    Active  Ulcer ([id number])  Benign cyst of breast ([id number])  Comments:  2017 CDT 14:44 CDT - Maureen Agrawal RN  right breast, Reviewed as documented in chart.   Surgical history:     - BILAT MAMMAPLASTY W/ IMP (Bilateral) performed by Jesus Molina MD on 2017 at 42 Years.  Comments:  2017 09:58 - Dyana Sharma RN  auto-populated from documented surgical case   - MASTOPEXY (Bilateral) performed by Jesus Molina MD on 2017 at 42 Years.  Comments:  2017 16:02 - Shahida Cadena RN  auto-populated from documented surgical case  Cholecystectomy; (CPT4 80265).   x 2., Reviewed as documented in chart.   Family history:    Pancreatic cancer  Father ()  Cancer  Father ()  Hypertension.  Brother  Father ()  Pancreatic cancer  Father ()  , Reviewed as documented in chart.   Social history:    Social & Psychosocial Habits    Alcohol  2015 Risk Assessment: No Risk    2018  Use: Current    Type: Liquor     Frequency: 1-2 times per month    Has alcohol use interfered with work or home life? No    Do you ever drink more than intended? No    Has anyone been hurt or at risk by your drinking? No    Ready to change: No    Concerns about alcohol use in household: No    12/18/2018  Use: Past    Employment/School  11/03/2015  Status: Employed    Activity level: Occasional physical work    Highest education: High school    Hazardous equipment operation: No    Comment: high school graduate - 11/03/2015 12:44 - Chase Mccurdy L[employer]    12/18/2018  Status: Unemployed    Exercise  03/08/2017  Self assessment: Fair condition    Home/Environment  03/16/2017  Lives with: Children    Living situation: Home/Independent    Alcohol abuse in household: No    Substance abuse in household: No    Smoker in household: No    Injuries/Abuse/Neglect in household: No    Feels unsafe at home: No    Safe place to go: Yes    Family/Friends available to help: Yes    Concern for family members at home: No    Major illness in household: No    Financial concerns: No    Concerns over TV/Computer/Game use: No    Comment: has 2 children - 11/03/2015 12:45 - Chase Mccurdy [employer]    Nutrition/Health  03/08/2017  Type of diet: Regular    Caffeine intake amount: Sometimes coffee    Wants to lose weight: No    Sleeping concerns: No    Feels highly stressed: No    Sexual  09/13/2017  Sexually active: Yes    First active at age: 28 Years    History of sexual abuse: No    Substance Abuse  11/03/2015 Risk Assessment: No Risk    06/19/2018  Use: Never    12/18/2018  Use: Never    Tobacco  04/09/2019  Use: Never (less than 100 in l    Patient Wants Consult For Cessation Counseling N/A    04/26/2019  Use: Never (less than 100 in l    Patient Wants Consult For Cessation Counseling No, Reviewed as documented in chart.   Problem list:    Active Problems (17)  Anxiety   AR (allergic rhinitis)   Benign cyst of breast   Constipation   Depression   GERD  (gastroesophageal reflux disease)   Glaucoma   H/O intracranial hemorrhage   Headache   Hemorrhoids   Hypertension   Insomnia   Iron deficiency anemia   PUD (peptic ulcer disease)   Stroke   Ulcer   Urinary urgency , per nurse's notes.      Physical Examination               Vital Signs             Time:  4/26/2019 18:16:00.   Vital Signs   4/26/2019 17:47 CDT      Peripheral Pulse Rate     77 bpm                             Respiratory Rate          18 br/min                             SpO2                      100 %                             Oxygen Therapy            Room air                             Systolic Blood Pressure   105 mmHg                             Diastolic Blood Pressure  66 mmHg                             Mean Arterial Pressure, Cuff              79 mmHg    4/26/2019 17:32 CDT      Temperature Oral          36.5 DegC                             Temperature Oral (calculated)             97.70 DegF                             Peripheral Pulse Rate     78 bpm                             Respiratory Rate          18 br/min                             SpO2                      98 %                             Systolic Blood Pressure   106 mmHg                             Diastolic Blood Pressure  70 mmHg  .      Vital Signs (last 24 hrs)_____  Last Charted___________  Temp Oral     36.5 DegC  (APR 26 17:32)  Heart Rate Peripheral   77 bpm  (APR 26 17:47)  Resp Rate         18 br/min  (APR 26 17:47)  SBP      105 mmHg  (APR 26 17:47)  DBP      66 mmHg  (APR 26 17:47)  SpO2      100 %  (APR 26 17:47)  Weight      69 kg  (APR 26 17:32)  Height      157 cm  (APR 26 17:32)  BMI      27.99  (APR 26 17:32).   Measurements   4/26/2019 17:32 CDT      Weight Dosing             69 kg                             Weight Measured           69 kg                             Weight Measured and Calculated in Lbs     152.12 lb                             Height/Length Dosing      157 cm                              Height/Length Measured    157 cm                             Body Mass Index Measured  27.99 kg/m2  .   Basic Oxygen Information   2019 17:47 CDT      SpO2                      100 %                             Oxygen Therapy            Room air    2019 17:32 CDT      SpO2                      98 %  .   General:  Alert, no acute distress.    Skin:  Warm, dry, pale.    Head:  Normocephalic, atraumatic.    Neck:  Supple, trachea midline, no JVD.    Eye:  Pupils are equal, round and reactive to light, extraocular movements are intact, Conjunctiva: Pale.    Ears, nose, mouth and throat:  Tympanic membranes clear, oral mucosa moist.    Cardiovascular:  Regular rate and rhythm, No murmur, Normal peripheral perfusion.    Respiratory:  Lungs are clear to auscultation, respirations are non-labored, breath sounds are equal.    Chest wall:  No tenderness, No deformity.    Back:  Nontender, Normal range of motion.    Musculoskeletal:  Normal ROM, no deformity.    Gastrointestinal:  Soft, Nontender, Non distended, Normal bowel sounds.    Genitourinary   Neurological:  Alert and oriented to person, place, time, and situation, CN II-XII intact, normal sensory observed, 4/5 strength RLE (previous CVA).    Lymphatics:  No lymphadenopathy.   Psychiatric:  Cooperative, appropriate mood & affect.       Medical Decision Making   Differential Diagnosis:  Anemia.   Documents reviewed:  Emergency department nurses' notes, emergency department records, prior records.    Orders  Launch Order Profile (Selected)   Inpatient Orders  Ordered  Cardiac Monitorin19 18:11:00 CDT, Constant Order  Peripheral IV Insertion: 19 18:11:00 CDT  Type and Crossmatch 1st order: 19 18:12:00 CDT, Routine collect, Blood, Lab Collect, Packed RBC, To Keep Ahead, 2, 19, 19 18:12:00 CDT  [facility] ED Consult Internal Medicine: 19 18:12:00 CDT, Symptomatic anemia  Urine Pregnancy Test POC: 19 17:34:00 CDT, Stop date  04/26/19 17:34:00 CDT, 04/26/19 17:34:00 CDT  Ordered (Dispatched)  CMP: Now collect, 04/26/19 18:11:00 CDT, Blood, Stop date 04/26/19 18:11:00 CDT, Lab Collect, 04/26/19 18:11:00 CDT  PT: Stat collect, 04/26/19 18:11:00 CDT, Blood, Stop date 04/26/19 18:11:00 CDT, Lab Collect, 04/26/19 18:11:00 CDT  PTT: Stat collect, 04/26/19 18:11:00 CDT, Blood, Stop date 04/26/19 18:11:00 CDT, Lab Collect, 04/26/19 18:11:00 CDT  Ordered (In-Lab)  Blood Smear Microscopic Exam: STAT collect, 04/26/19 17:40:00 CDT, Blood, Collected, Stop date 04/26/19 17:40:00 CDT, Lab Collect, 04/26/19 17:34:00 CDT.   Cardiac monitor:  Time 4/26/2019 18:18:00, Rate 75, normal sinus rhythm.    Results review:  Lab results : Lab View   4/26/2019 17:40 CDT      WBC                       6.6 x10(3)/mcL                             RBC                       4.09 x10(6)/mcL                             Hgb                       7.5 gm/dL  LOW                             Hct                       28.6 %  LOW                             Platelet                  485 x10(3)/mcL  HI                             MCV                       69.9 fL  LOW                             MCH                       18.3 pg  LOW                             MCHC                      26.2 gm/dL  LOW                             RDW                       18.9 %  HI                             MPV                       10.4 fL                             Abs Neut                  3.65 x10(3)/mcL                             Neutro Auto               55  NA                             Lymph Auto                30 %                             Mono Auto                 12 %                             Eos Auto                  2  NA                             Abs Eos                   0.13 x10(3)/mcL  NA                             Basophil Auto             1  NA                             Abs Neutro                3.65 x10(3)/mcL  NA                             Abs Lymph                  1.97 x10(3)/mcL  NA                             Abs Mono                  0.77 x10(3)/mcL  NA                             Abs Baso                  0.07 x10(3)/mcL  NA                             IG%                       0 %  NA                             IG#                       0.0200  NA  ,    Labs (Last four charted values)  WBC                  6.6 (APR 26)   Hgb                  L 7.5 (APR 26)   Hct                  L 28.6 (APR 26)   Plt                  H 485 (APR 26) .       Reexamination/ Reevaluation   Vital signs   Basic Oxygen Information   4/26/2019 17:47 CDT      SpO2                      100 %                             Oxygen Therapy            Room air    4/26/2019 17:32 CDT      SpO2                      98 %        Impression and Plan   Diagnosis   Symptomatic anemia (APP77-DB D64.9)   Plan   Condition: Stable.    Counseled: Patient, Regarding diagnosis, Regarding diagnostic results, Regarding treatment plan, Regarding prescription, Patient indicated understanding of instructions.    Notes: Seen and discussed with Humble LAZARO.

## 2022-04-30 NOTE — ED PROVIDER NOTES
Patient:   Pretty Pratt            MRN: [id number]            FIN: [id number]               Age:   43 years     Sex:  Female     :  1975   Associated Diagnoses:   CVA (cerebral vascular accident)   Author:   Marilu MULTANI, Nathan MILLER      Basic Information   Time seen: Date & time 2018 19:04:00.   History source: Patient.   Arrival mode: Private vehicle, walking.   History limitation: None.   Additional information: Patient's physician(s): Walter AL, Megha Read.      History of Present Illness   The patient presents with Patient states numbness to the left side of her body x three days. states numbness and tingling to her face and lips as well. states a history of a CVA last October (MIKAELA rizvi). and     I, , assumed care of this patient at 21:24.    44 y/o female with a history of CVA(last October) presents to ED with facial numbness and headache on/off for 3 days accompanied by one episode of trouble pronouncing words this AM(18). Pt states this does not feel like when she had her stroke because she had a facial droop and trouble walking with the stroke. She states the numbness is also felt in her left leg and arm. .  The onset was 3  days ago.  The course/duration of symptoms is fluctuating in intensity.  Location: face. The character of symptoms is numbness.  The degree at onset was moderate.  The degree at maximum was moderate.  The degree at present is moderate.  Risk factors consist of history of CVA.  Prior episodes: none.  Therapy today: none.  Associated symptoms: headache.        Review of Systems   Constitutional symptoms:  Negative except as documented in HPI.   Skin symptoms:  Negative except as documented in HPI.   Eye symptoms:  Negative except as documented in HPI.   ENMT symptoms:  Negative except as documented in HPI.   Respiratory symptoms:  Negative except as documented in HPI.   Cardiovascular symptoms:  Negative except as documented in HPI.    Gastrointestinal symptoms:  Negative except as documented in HPI.   Musculoskeletal symptoms:  Negative except as documented in HPI.   Neurologic symptoms:  Headache, speech problem, facial numbness, left arm and left leg numbness.    Hematologic/Lymphatic symptoms:  Negative except as documented in HPI.             Additional review of systems information: All other systems reviewed and otherwise negative.      Health Status   Allergies: No known allergies.   Medications:  (Selected)   Inpatient Medications  Pending Complete  midazolam: 2 mg, form: Injection, IV Push, q5min, Order duration: 2 dose(s), first dose 17 7:20:00 CDT, stop date 17 7:29:00 CDT, (up to 5 mg for moderate anxiety), 30,025  Prescriptions  Prescribed  Aricept 5 mg oral tablet: 5 mg = 1 tab(s), Oral, Once a day (at bedtime), # 90 tab(s), 1 Refill(s), Pharmacy: [facility]/pharmacy #[id number]  Protonix 40 mg ORAL enteric coated tablet: 40 mg = 1 tab(s), Oral, BID, # 60 tab(s), 3 Refill(s), Pharmacy: [facility]/pharmacy #[id number]  Xanax 0.25 mg oral tablet: 0.25 mg = 1 tab(s), Oral, BID, PRN PRN as needed for anxiety, # 24 tab(s), 2 Refill(s)  gabapentin 100 mg oral capsule: 100 mg = 1 cap(s), Oral, BID, # 180 cap(s), 1 Refill(s), Pharmacy: [facility]/pharmacy #[id number]  levetiracetam 500 mg oral tablet: 500 mg = 1 tab(s), Oral, BID, # 180 tab(s), 1 Refill(s), Pharmacy: [facility]/pharmacy #[id number]  metoprolol tartrate 25 mg oral tab: 25 mg = 1 tab(s), Oral, BID, # 60 tab(s), 3 Refill(s), Pharmacy: [facility]/pharmacy #[id number]  Documented Medications  Documented  BUT/APAP/CAF TAB: 1 tab(s), Oral, q4hr  SPIRONOLACT  TAB 50M mg = 1 tab(s), Oral, Daily.      Past Medical/ Family/ Social History   Medical history:    Active  Cataracts ([id number])  Able to lie down ([id number])  Exercise tolerance ([id number])  Ulcer (70162101)  Benign cyst of breast (8345922246)  Comments:  2017 CDT 14:44 CDT - Nghia HUDSON, Maureen LEGGETT.  right breast, CVA.   Surgical history:     - BILAT  MAMMAPLASTY W/ IMP (Bilateral) on 2017 at 42 Years.  Comments:  2017 09:58 - Zachary RN, Dyana QUINTERO  auto-populated from documented surgical case  [id number] - MASTOPEXY (Bilateral) on 2017 at 42 Years.  Comments:  2017 16:02 - Shahida Cadena RN  auto-populated from documented surgical case  Cholecystectomy; (17392).   x 2..   Family history:    Diabetes mellitus type 2  Mother  Cancer  Father  Hypertension.  Brother  Pancreatic cancer  Father  .   Social history: Alcohol use: 1-2 times per month, Tobacco use: Denies, Drug use: Denies, Occupation: Employed.      Physical Examination               Vital Signs   Vital Signs   2018 20:40 CDT       Oxygen Therapy            Room air    2018 19:04 CDT       Temperature Oral          36.8 DegC                             Temperature Oral (calculated)             98.24 DegF                             Peripheral Pulse Rate     78 bpm                             Respiratory Rate          22 br/min                             SpO2                      100 %                             Oxygen Therapy            Room air                             Systolic Blood Pressure   119 mmHg                             Diastolic Blood Pressure  82 mmHg  .   Measurements   2018 19:04 CDT       Weight Dosing             59 kg                             Weight Measured and Calculated in Lbs     130.07 lb                             Weight Estimated          59 kg                             Height/Length Dosing      157 cm                             Height/Length Estimated   157 cm                             Body Mass Index Estimated 23.94 kg/m2  .   Basic Oxygen Information   2018 20:40 CDT       Oxygen Therapy            Room air    2018 19:04 CDT       SpO2                      100 %                             Oxygen Therapy            Room air  .   General:  Alert, mildly anxious.    Skin:  Warm, dry.    Head:  Normocephalic,  atraumatic.    Neck:  Supple, trachea midline, no JVD, no carotid bruit.    Eye:  Pupils are equal, round and reactive to light, extraocular movements are intact, normal conjunctiva, vision unchanged.    Ears, nose, mouth and throat:  Tympanic membranes clear, oral mucosa moist, no pharyngeal erythema or exudate.    Cardiovascular:  Regular rate and rhythm, No murmur, Normal peripheral perfusion.    Respiratory:  Lungs are clear to auscultation, breath sounds are equal.    Gastrointestinal:  Soft, Nontender, Non distended, Normal bowel sounds.    Back:  Nontender, Normal range of motion.    Musculoskeletal:  Normal ROM, normal strength, no tenderness, no swelling.    Neurological:  Alert and oriented to person, place, time, and situation, No focal neurological deficit observed, CN II-XII intact, normal sensory observed, normal motor observed, normal coordination observed, Speech: Normal.    Lymphatics:  No lymphadenopathy.   Psychiatric:  Cooperative, appropriate mood & affect.       Medical Decision Making   Documents reviewed:  Emergency department nurses' notes.   Results review:  Lab results : Lab View   4/5/2018 19:26 CDT       Sodium Lvl                138 mmol/L                             Potassium Lvl             3.6 mmol/L                             Chloride                  104 mmol/L                             CO2                       27.0 mmol/L                             Calcium Lvl               8.6 mg/dL                             Glucose Lvl               76 mg/dL                             BUN                       12.0 mg/dL                             Creatinine                0.70 mg/dL                             eGFR-AA                   >60 mL/min/1.73 m2  NA                             eGFR-JANNA                  >60 mL/min/1.73 m2  NA                             Bili Total                0.2 mg/dL                             Bili Direct               0.10 mg/dL                              Bili Indirect             0.10 mg/dL                             AST                       23 unit/L                             ALT                       19 unit/L                             Alk Phos                  60 unit/L                             Total Protein             8.1 gm/dL                             Albumin Lvl               3.70 gm/dL                             Globulin                  4.40 gm/dL  HI                             A/G Ratio                 0.8 ratio  LOW                             PT                        12.7 second(s)                             INR                       0.93                             PTT                       34.4 second(s)                             WBC                       6.6 x10(3)/mcL                             RBC                       4.47 x10(6)/mcL                             Hgb                       9.4 gm/dL  LOW                             Hct                       32.9 %  LOW                             Platelet                  401 x10(3)/mcL  HI                             MCV                       73.6 fL  LOW                             MCH                       21.0 pg  LOW                             MCHC                      28.6 gm/dL  LOW                             RDW                       16.2 %                             MPV                       10.1 fL                             Abs Neut                  3.24 x10(3)/mcL                             Neutro Auto               49 %  NA                             Lymph Auto                35 %  NA                             Mono Auto                 12 %  NA                             Eos Auto                  3 %  NA                             Abs Eos                   0.2 x10(3)/mcL                             Basophil Auto             1 %  NA                             Abs Neutro                3.24 x10(3)/mcL                             Abs Lymph                 2.3  x10(3)/mcL                             Abs Mono                  0.8 x10(3)/mcL                             Abs Baso                  0.1 x10(3)/mcL                             Platelet Est              Normal                             Poik                      1  NA                             Schistocyte               1  NA  .   Radiology results:  Rad Results (ST)  < 12 hrs   Accession: [id number]  Order: CT Head W/O Contrast  Report Dt/Tm: 04/05/2018 19:31  Report:   CT Head W/O Contrast     REASON FOR EXAM: R/O CVA, left sided numbness;Other (please specify)     TECHNIQUE: CT images of the head without IV contrast. Dose length  product is 1191.64 mGycm. Automatic exposure control was utilized to  limit radiation dose.     COMPARISON: CT of the head from October 29, 2017     FINDINGS:     Remote infarct with associated encephalomalacia and gliosis on the  left parietal lobe at the convexity. No acute ischemic changes or new  infarct. No acute parenchymal hemorrhage or contusion. No mass, mass  effect, midline shift, edema, or extra-axial fluid collection. The  gray-white matter differentiation is maintained. The cerebral sulci  and basal cisterns are normal. No hydrocephalus.     The globes are intact. The paranasal sinuses and mastoid air cells are  well pneumatized.     IMPRESSION:      No acute intracranial abnormality. Remote infarct of the left parietal  lobe.      .      Reexamination/ Reevaluation   Patient with somewhat vague symptoms, but consistently describes some various numbness, along with some difficulty speaking so will admit for CVA/TIA workup      Impression and Plan   Diagnosis   CVA (cerebral vascular accident) ([id number] I63.9)      Calls-Consults   -  Brit Stephensist.   Plan   Condition: Stable.    Disposition: Admit time  4/5/2018 23:09:00, Place in Observation Unit.    Counseled: Patient, Regarding diagnosis, Regarding diagnostic results, Regarding treatment plan, Patient indicated  understanding of instructions.    Notes: I, Tiffanie Ibrahim, acted solely as a scribe for and in the presence of  who performed the service., I  personally performed all of the above services as recorded in this chart.

## 2022-04-30 NOTE — DISCHARGE SUMMARY
Patient:   Pretty Pratt            MRN: [id number]            FIN: [id number]               Age:   44 years     Sex:  Female     :  1975   Associated Diagnoses:   None   Author:   Ann-Marie Carpenter MD      ADMISSION DATE:19  DISCHARGE DATE:19  ATTENDING PHYSICIAN:Dr.Jay Fierro  PRIMARY CARE PHYSICIAN:Dr.Suguna Mirza  CONDITION ON DISCHARGE:Stable  FINAL DIAGNOSIS:   Symptomatic anemia, possible GI bleed hypertension.CVA status post right upper extremity weakness, anxiety, hemorrhoids  PROCEDURES: None  HPI:44-year-old  female with a past medical history of hypertension, chronic anemia, GERD, CVA status post right upper extremity weakness, memory loss, insomnia, anxiety, hemorrhoids presented to the ER as she was called by her primary physician regarding her lab works with hemoglobin 7.  Patient reported that since 2 months she has been feeling very fatigued and has generalized weakness unable to do her regular household work, gets tired after taking shower, associated with palpitation and shortness of breath, reports to walk 1 block before getting short of breath..  She has a history of hemorrhoid since last year and reports to have mild bleeding with every bowel movement, noticed after she wipes.  She complains of constipation and hard stool and has been using MiraLAX on and off.  She has chronic anemia and is being worked up, scheduled for colonoscopy and endoscopy in May 2019.  She stopped taking iron supplement as she was complaining of stomach upset with pills.  Patient also has history of thyroid nodule with TSH normal followed up in ENT clinic on the planning for biopsy.  Denied hematemesis, hemoptysis, melena, blood in urine, excessive vaginal bleeding, chest pain, PND, orthopnea, burning urination, bowel or bladder disturbances, nausea, vomiting, loose stools.  Denied using any NSAIDs, ibuprofen, BC powder, excessive pain medications.    PE:Vital Signs (last 24  hrs)_____  Last Charted___________  Temp Oral     36.3 DegC  (APR 27 07:00)  Heart Rate Peripheral   64 bpm  (APR 27 07:00)  Resp Rate         18 br/min  (APR 27 07:00)  SBP      112 mmHg  (APR 27 07:00)  DBP      73 mmHg  (APR 27 07:00)  SpO2      99 %  (APR 27 07:00)  Weight      69 kg  (APR 26 17:32)  Height      157 cm  (APR 26 17:32)  BMI      27.99  (APR 26 17:32)  General: AAOx3, NAD, alert and cooperative,   HEENT: PERRLA, EOMI, pale conjunctiva  Neck: no LAD, no JVD, supple, no masses or thyromegaly  CVS: S1/S2 nml, RRR, no murmurs, rubs or gallops, no carotid bruits  Resp: +bibasilar rales, no wheezing  GI: Not distended, not tender, BS+, no guarding  Skin: not jaundiced, warm, no rashes, +flaky skin  Musculoskeletal: normal ROM, no joint tenderness, normal muscular development  Extremities: trace peripheral edema, peripheral pulses intact  Neuro: CN II-XII grossly intact, strength and sensation symmetric and intact throughout, no focal neurological deficits  Rectal examination: Small nonbleeding external hemorrhoids noticed, did not appreciate internal hemorrhoids or any stricture.  FOBT positive    HOSPITAL COURSE  : 44-year-old female with above past medical history admitted for symptomatic anemia with hemoglobin of 7 likely secondary to her chronic hemorrhoids.FOBT positive in ER.  Received 2 units packed RBC which improved her hemoglobin to 10.6.  Patient is followed up in surgery clinic for hemorrhoids, has scheduled appointment for endoscopy on May 2019.  Patient is clinically and hemodynamically stable will discharge.  Follow-up with above appointments  LABORATORY/DATA :  Labs Last 24 Hours   Chemistry  Hematology/Coagulation    Sodium Lvl: 138 mmol/L (04/26/19 17:40:00) PT: 12.8 second(s) (04/26/19 17:40:00)   Potassium Lvl: 3.7 mmol/L (04/26/19 17:40:00) INR: 0.97 (04/26/19 17:40:00)   Chloride: 106 mmol/L (04/26/19 17:40:00) PTT: 33.7 second(s) (04/26/19 17:40:00)   CO2: 27 mmol/L (04/26/19  17:40:00) WBC: 5.1 x10(3)/mcL (04/27/19 07:29:00)   Calcium Lvl: 8.3 mg/dL Low (04/26/19 17:40:00) RBC: 4.98 x10(6)/mcL (04/27/19 07:29:00)   Glucose Lvl: 104 mg/dL (04/26/19 17:40:00) Hgb: 10.6 gm/dL Low (04/27/19 07:29:00)   BUN: 11 mg/dL (04/26/19 17:40:00) Hct: 36.2 % (04/27/19 07:29:00)   Creatinine: 0.7 mg/dL (04/26/19 17:40:00) Platelet: 375 x10(3)/mcL (04/27/19 07:29:00)   eGFR-AA: >105 (04/26/19 17:40:00) MCV: 72.7 fL Low (04/27/19 07:29:00)   eGFR-JANNA: 98 mL/min (04/26/19 17:40:00) MCH: 21.3 pg Low (04/27/19 07:29:00)   Bili Total: 0.3 mg/dL (04/26/19 17:40:00) MCHC: 29.3 gm/dL Low (04/27/19 07:29:00)   Bili Direct: 0.1 mg/dL (04/26/19 17:40:00) RDW: 20.8 % High (04/27/19 07:29:00)   Bili Indirect: 0.2 mg/dL (04/26/19 17:40:00) MPV: 10.7 fL High (04/27/19 07:29:00)   AST: 16 unit/L (04/26/19 17:40:00) Abs Neut: 2.55 x10(3)/mcL (04/27/19 07:29:00)   ALT: 19 unit/L (04/26/19 17:40:00) Neutro Auto: 50 x10(3)/mcL (04/27/19 07:29:00)   Alk Phos: 67 unit/L (04/26/19 17:40:00) Lymph Auto: 32 % (04/27/19 07:29:00)   Total Protein: 7.7 gm/dL (04/26/19 17:40:00) Mono Auto: 13 % High (04/27/19 07:29:00)   Albumin Lvl: 3.5 gm/dL (04/26/19 17:40:00) Eos Auto: 3 (04/27/19 07:29:00)   Globulin: 4.2 gm/mL High (04/26/19 17:40:00) Abs Eos: 0.14 x10(3)/mcL (04/27/19 07:29:00)   A/G Ratio: 0.8 ratio Low (04/26/19 17:40:00) Basophil Auto: 1 (04/27/19 07:29:00)    Abs Neutro: 2.55 x10(3)/mcL (04/27/19 07:29:00)    Abs Lymph: 1.61 x10(3)/mcL (04/27/19 07:29:00)    Abs Mono: 0.67 x10(3)/mcL (04/27/19 07:29:00)    Abs Baso: 0.07 x10(3)/mcL (04/27/19 07:29:00)    IG%: 0 % (04/27/19 07:29:00)    IG#: 0.02 (04/27/19 07:29:00)    Hypochrom: 2+ (04/26/19 17:40:00)    Platelet Est: Increased (04/26/19 17:40:00)    Anisocyte: 1+ (04/26/19 17:40:00)    Microcyte: 2+ (04/26/19 17:40:00)    Macrocyte: 1+ (04/26/19 17:40:00)    Polychrom: 1+ (04/26/19 17:40:00)    RBC Morph: Abnormal (04/26/19 17:40:00)    Schistocyte: 1+ (04/26/19  17:40:00)    Ovalocytes: 1+ (04/26/19 17:40:00)     DISCHARGE INSTRUCTIONS:  DIET:Low sodium   ACTIVITY:As tolerated  DISPOSITION: Patient to be taken all his medications and follow up   FOLLOW UP APPOINTMENTS:  Follow up with PCP in 1-2 weeks  Follow up with GI for scopes as scheduled  Follow up with surgery  in 1-2 weeks for   Present to Er in case of Emergency

## 2022-04-30 NOTE — DISCHARGE SUMMARY
Patient:   Pretty Pratt            MRN: [id number]            FIN: [id number]               Age:   43 years     Sex:  Female     :  1975   Associated Diagnoses:   None   Author:   Austin Romero MD      Physical Examination   General:  Alert and oriented, No acute distress.    Eye:  Pupils are equal, round and reactive to light, Normal conjunctiva.    HENT:  Normocephalic, Normal hearing, Oral mucosa is moist.    Neck:  Supple, No carotid bruit, No jugular venous distention.    Respiratory:  Lungs are clear to auscultation, Respirations are non-labored, Breath sounds are equal.    Cardiovascular:  Normal rate, Regular rhythm, No murmur, No gallop.    Gastrointestinal:  Soft, Non-tender, Non-distended, Normal bowel sounds, No organomegaly.       Vital Signs (last 24 hrs)_____  Last Charted___________  Temp Oral     37.1 DegC  ( 08:)  Heart Rate Peripheral   60 bpm  (:)  Resp Rate         20 br/min  (:)  SBP      105 mmHg  (:)  DBP      75 mmHg  (:)  SpO2      99 %  (:)     Lymphatics:  No lymphadenopathy neck, axilla, groin.    Musculoskeletal:  No tenderness, No swelling.    Integumentary:  Warm, Dry, Intact, No pallor, No rash.    Neurologic:  Alert, Oriented, Normal sensory, No focal deficits, Cranial Nerves II-XII are grossly intact.    Psychiatric:  Cooperative, Appropriate mood & affect, Normal judgment, Non-suicidal.    Genitourinary:  No inguinal tenderness.       Hospital Course   Hospital Course   Admitted from:   This is a 43-year-old  female comes in with complaints of facial numbness and admitted and CVA workup initiated.  Patient has a history of hemorrhagic CVA and 2017.  CT head was negative for any acute findings. MRI head without contrast on 2018= old stable appearing left parietal lobe hemorrhage with diminishing enhancement (patient has seen neurosurgery in the past for this and no surgical  intervention was deemed necessary at that time).  MRA head was benign.  Repeat CT head without contrast on 4/7/2018= showed no acute intracranial process.  HIV and RPR were both negative.  Carotid ultrasound was benign.  Patient was seen by neuro and placed on Keppra 500 mg by mouth twice a day empirically.  EEG was ordered.  Patient's blood pressure has been in stable range without any BP medications.  We will not restart BP medications at this time.  Patient advised to keep a log of blood pressures at home to take to PCP on follow-up visits for reevaluation for any need for BP medications.  Patient symptomatically improved. Neuro states they will follow-up with EEG results in outpatient setting .  Patient unlikely with any seizure activity per neuro.  More suspicious for atypical migraine.  Keppra to be continued prophylactically for patient's history of hemorrhagic CVA. She is now hemodynamically stable for discharge to home with outpatient follow-up .  Neuro has cleared for discharge    Discharge diagnosis:    Facial numbness possibly due to atypical migraine  History of hypertension  Anemia, microcytic likely secondary to menorrhagia  History of hemorrhagic CVA      Discharge home ( okay by neuro for discharge)  Diet = cardiac  Activity ad maryjo.  Follow-up with PCP and neuro and GYN ×1 week  Follow-up with neurosurgery ×1 week  Total discharge time = 31 minutes.        Discharge Plan   Discharge Summary Plan   Discharge Status: stable.

## 2022-05-05 NOTE — HISTORICAL [facility] CERNER
Patient Seen in: [facility]      History   Patient presents with:  Finger Pain    Stated Complaint: FINGER PAIN BRUISING    HPI/Subjective:   HPI    This is a well-appearing 22-year-old who presents with left third digit pain and swelling and This is a historical note converted from Marsha. Formatting and pictures may have been removed.  Please reference Marsha for original formatting and attached multimedia. Chief Complaint  Pt. C/o numbness to left face,arm, and leg and difficulty pronoucing words started about 3 days ago.. hx of strokes last year.. ambulatory to triage with steady gait.. gcs 15  Reason for Consultation  L sided weakness, L sided facial numbness; R/O CVA  History of Present Illness  44 y/o female with a history of CVA(last October) presented to ED yesterday with facial numbness and headache on/off for 3 days accompanied by one episode of trouble pronouncing words this AM(4/5/18). Pt states this does not feel like when she had her stroke because she had a facial droop and trouble walking with the stroke. She states the numbness is also felt in her left leg and arm.?The onset was 3 days ago.??Pt currently c/o HA to R occipital area. States she was started on Keppra 500mg BID after CVA but denies every having had seizure or migraine HA. C/O blurred vision since CVA last year.  Review of Systems  Constitutional/General: Overall, doing well  Neuro: denies dizziness, balance difficulty  Eyes: as noted above  Cardiovascular: no recent chest pain  Respiratory: no SOB, no recent worrisome cough  Gastrointestinal: no nausea, no diarrhea  Genitourinary: no blood in urine or painful urination  Hema/Lymph: no recent swollen or painful lymph nodes  Musculoskeletal: no recent severe joint pain  Integumentary/Skin: no rashes, no recent worrisome bruising  Psychiatric/mood: no recent severe depression or anxiety  Sleep: denies insomnia, denies difficulty with sleep maintenance, denies snoring, denies EDS or day time fatigue, denies drowsy driving  ?  Physical Exam  Vitals & Measurements  T:?36.5? ?C (Oral)? TMIN:?36.4? ?C (Oral)? TMAX:?36.8? ?C (Oral)? HR:?52(Peripheral)? RR:?18? BP:?111/73? SpO2:?100%? WT:?62.0?kg? WT:?59?kg?  General: no acute  Mouth: Mucous membranes are moist.      Pharynx: Oropharynx is clear. Uvula midline. Eyes:      General: Lids are normal.      Extraocular Movements: Extraocular movements intact.       Conjunctiva/sclera: Conjunctivae normal.      Pupils: Pupils are equ distress  CV:?RRR?Sinus jaiden per telemetry  Neck: no bruits  Lungs:?clear?to auscultation?anteriorly  Mental Status/Orientation:?AAOX4  Language: Speech?clear,?coherent  Fund of Knowledge: vocabulary?appropriate  Commands: follows?multistep?commands reliably  Mood/affect: normal and calm  CN II (optic): visual fields: full to confrontation  CN II, III (optic, oculomotor): PERRLA, ptosis absent  CN III, IV, VI (oculomotor, trochlear, abducens): EOMI  CN V (trigenimal): facial tactile, sense decreased L side of face  CN VII (facial): face?symmetrical  CN VIII (vestibular): hearing to spoken voice, intact  CN IX, X (glossopharyngeal, vagus): soft palate elevation at midline  CN XII (hypoglossal): tongue protrusion at midline  Motor: no focal deficits noticed or  Right: deltoid 3/5, bicept 3/5, tricep 3/5  Left: deltoid 5/5, bicept 5/5, tricep 5/5  Right: iliopsas 5/5, quadracep 5/5, dorsiflexor 5/5, plantar flexors 5/5  Left: iliopsas 3/5, quadracep 3/5, dorsiflexor 3/5, plantar flexors 3/5  Sensory: tactile, numbness to L upper and lower ext  Cerebellar: tremor absent, dysmetria with L finger to nose  DTRs:  Right: brachioradialis 2/4, median 2/4, knee jerk 2/4, achilles 2/4  Left: brachioradialis 2/4, median 2/4, knee jerk 2/4, achilles 2/4  Gait: not observed  Assessment/Plan  Neurologic problem  Hx of CVA  ?  L sided numbness/weakness  ?  Plan:  Consider complex migraine  Further recs per Dr. Daugherty  Fall precautions  Prepare to DC home   Problem List/Past Medical History  Ongoing  Able to lie down  Anxiety  Benign cyst of breast  Cataracts  Exercise tolerance  GERD (gastroesophageal reflux disease)  H/O intracranial hemorrhage  Insomnia  Ulcer  Historical  No qualifying data  Procedure/Surgical History  19316 - MASTOPEXY (Bilateral) (04/20/2017), 19325 - BILAT MAMMAPLASTY W/ IMP (Bilateral) (04/20/2017), Alteration of Bilateral Breast with Synthetic Substitute, Open Approach (04/20/2017), Mammaplasty,  augmentation; with prosthetic implant (2017), Mastopexy (2017),  x 2, Cholecystectomy;.  Medications  Inpatient  hydrALAZINE 20 mg/mL injectable solution, 10 mg= 0.5 mL, IV Push, q4hr, PRN  labetalol, 10 mg= 2 mL, IV Push, q10min, PRN  levetiracetam 500 mg oral tablet, 500 mg= 1 tab(s), Oral, BID  Sodium Chloride 0.9% intravenous solution 1,000 mL, 1000 mL, IV  Home  Aricept 5 mg oral tablet, 5 mg= 1 tab(s), Oral, Once a day (at bedtime), 1 refills,? ?Not taking  BUT/APAP/CAF TAB, 1 tab(s), Oral, q4hr  gabapentin 100 mg oral capsule, 100 mg= 1 cap(s), Oral, BID, 1 refills  levetiracetam 500 mg oral tablet, 500 mg= 1 tab(s), Oral, BID, 1 refills  metoprolol tartrate 25 mg oral tab, 25 mg= 1 tab(s), Oral, BID, 3 refills  Protonix 40 mg ORAL enteric coated tablet, 40 mg= 1 tab(s), Oral, BID, 3 refills  SPIRONOLACT TAB 50MG, 50 mg= 1 tab(s), Oral, Daily,? ?Not taking  Xanax 0.25 mg oral tablet, 0.25 mg= 1 tab(s), Oral, BID, PRN, 2 refills  Allergies  No Known Allergies  Social History  Alcohol - No Risk, 2015  Current, Liquor, 1-2 times per month, Alcohol use interferes with work or home: No. Drinks more than intended: No. Others hurt by drinking: No. Ready to change: No. Household alcohol concerns: No., 2017  Employment/School  Employed, Activity level: Occasional physical work. Highest education level: High school. Operates hazardous equipment: No., 2015  Exercise  Self assessment: Fair condition., 2017  Home/Environment  Lives with Children. Living situation: Home/Independent. Alcohol abuse in household: No. Substance abuse in household: No. Smoker in household: No. Injuries/Abuse/Neglect in household: No. Feels unsafe at home: No. Safe place to go: Yes. Family/Friends available for support: Yes. Concern for family members at home: No. Major illness in household: No. Financial concerns: No. TV/Computer concerns: No., 2017  Nutrition/Health  Regular, Caffeine intake  patient that I will treat for cellulitis at this time. There is no evidence of foreign body but there does appear to be a puncture wound very superficial to the palmar aspects of the digit. There is no evidence of abscess.   There is surrounding erythema amount: Sometimes coffee. Wants to lose weight: No. Sleeping concerns: No. Feels highly stressed: No., 03/08/2017  Sexual  Sexually active: Yes. Sexually active at age 28 Years. History of sexual abuse: No., 09/13/2017  Substance Abuse - No Risk, 11/03/2015  Never, 09/26/2016  Tobacco  Never smoker, 09/14/2015  Family History  Cancer: Father.  Diabetes mellitus type 2: Mother.  Hypertension.: Brother.  Pancreatic cancer: Father.  Immunizations  Vaccine Date Status   tetanus/diphtheria/pertussis, acel(Tdap) 03/08/2017 Given   Lab Results  Test Name Test Result Date/Time Comments   Sodium Lvl 138 mmol/L 04/05/2018 19:26 CDT    Potassium Lvl 3.6 mmol/L 04/05/2018 19:26 CDT    Chloride 104 mmol/L 04/05/2018 19:26 CDT    CO2 27.0 mmol/L 04/05/2018 19:26 CDT    Calcium Lvl 8.6 mg/dL 04/05/2018 19:26 CDT    Glucose Lvl 76 mg/dL 04/05/2018 19:26 CDT    BUN 12.0 mg/dL 04/05/2018 19:26 CDT    Creatinine 0.70 mg/dL 04/05/2018 19:26 CDT    Bili Total 0.2 mg/dL 04/05/2018 19:26 CDT    Bili Indirect 0.10 mg/dL 04/05/2018 19:26 CDT    AST 23 unit/L 04/05/2018 19:26 CDT    ALT 19 unit/L 04/05/2018 19:26 CDT    Alk Phos 60 unit/L 04/05/2018 19:26 CDT    Total Protein 8.1 gm/dL 04/05/2018 19:26 CDT    Albumin Lvl 3.70 gm/dL 04/05/2018 19:26 CDT    PT 12.7 second(s) 04/05/2018 19:26 CDT    INR 0.93 04/05/2018 19:26 CDT    PTT 34.4 second(s) 04/05/2018 19:26 CDT APTT equivalent to heparin levels of 0.2-0.4 u/mL as determined by Heparin.   WBC 6.6 x10(3)/mcL 04/05/2018 19:26 CDT    Hgb 9.4 gm/dL (Low) 04/05/2018 19:26 CDT    Hct 32.9 % (Low) 04/05/2018 19:26 CDT    Platelet 401 x10(3)/mcL (High) 04/05/2018 19:26 CDT    Diagnostic Results  (04/06/2018 09:58 CDT MRI Brain W W/O Contrast)  Impression:  Old stable appearing left parietal lobe hemorrhage with diminishing  enhancement.  ?   (04/05/2018 19:24 CDT CT Head W/O Contrast)  IMPRESSION:??  No acute intracranial abnormality. Remote infarct of the left parietal  lobe.  ?     [1]?CT  Head W/O Contrast; Sarita Doyle ROBERTSON 04/05/2018 19:24 CDT   I have performed a history and physical examination of the patient and discussed the management with the nurse practitioner.  ?   [x ] I reviewed the nurse practitioners note and agree with the documented findings and plan of care.  MRA head  EEG  Atypical stroke panel  Keppra 500mg BID  [ ] I reviewed the nurse practitioners note and agree with the documented findings and plan of care except:  ?

## 2022-05-05 NOTE — HISTORICAL [facility] CERNER
This is a historical note converted from [employer]. Formatting and pictures may have been removed.  Please reference [employer]ner for original formatting and attached multimedia. UA reviewed with RBC 3-5 and no bacteria. Due to symptoms, added urine culture and sensitivity. Will f/u.

## 2022-05-05 NOTE — HISTORICAL [facility] CERNER
This is a historical note converted from [employer]ner. Formatting and pictures may have been removed.  Please reference [employer]ner for original formatting and attached multimedia. Chief Complaint  c/o trouble swallowing; rtc IR biopsy results done 01/13/2020  History of Present Illness  44y F hx goiter presents with right lower thyroid lobe nodule measuring 3cm. She reports this has been slowly growing but unable to quantify time. She reports moderate fatigue, constipation, weight gain, cold intolerance. Her family history is notable for pancreatic and gastric cancer, but no thyroid cancer. No respiratory difficulties, but does endorse very mild occasional difficulties swallowing water where she has episodes of coughing and choking  ?   12/5/19: here for follow up on thyroid nodule; she endorses choking with eating (has history of hiatal hernia). She also endorses shortness of breath when lying flat. She denies any tremor, palpitations, changes to bowl habits. She did not undergo IR biopsy that was scheduled last time. She needs refill on pantoprazole. She has been have 2 weeks of sore throat and bilateral neck pain without any edema or fevers  ?   1/29/20: Here for f/u of IR biopsy. Benign follicular nodule with cystic degeneration. No other complaints at this time.  Review of Systems  CONSTITUTIONAL: (-) fevers, chills,? night sweats, weight loss, fatigue  EYES: (-) changes in vision, blurry vision, diplopia, wears glasses, runny eyes  ENT: (-) as per HPI  CARDIOVASCULAR: (-) CP, SOB, palpitations, orthopnea, claudications, varicosities  RESPIRATORY:?(-) SOB, ADRIAN  GASTROINTESTINAL: (-) n/v/d, constipation, hematochezia, melena, hematemesis  GENITOURINARY: (-) dysuria, hematuria,?discharge, odor, anorexia  MUSCULOSKELETAL: (-) ROM restriction, joint pain  SKIN: (-) jaundice, pruritus, change in skin, hair or nails  NEUROLOGIC:?(-) paresthesias, fasciculations, seizures or weakness  ENDOCRINE:(-) heat or cold intolerance,  polyuria, polydipsia, change in appetite, weight change  HEMATOLOGICAL:?(-) easy bruising or bleeding?  Physical Exam  Vitals & Measurements  T:?36.8? ?C (Oral)? HR:?71(Peripheral)? RR:?18? BP:?107/73?  HT:?158?cm? WT:?71.3?kg? BMI:?28.56? LMP:?01/06/2020 00:00 CST?  General: AAO NAD  Neuro: CN II - XII grossly intact  Head/ Face: AT NC, symmetric, sensations intact bilaterally  Eye: EOMI PERRLA  Ears: externally normal with grossly normal hearing  ????? AD: normal external ear, EAC patent, TM intact, no middle ear effusion, no retractions  ????? AS: normal external ear, EAC patent, TM intact, no middle ear effusion, no retractions  Nose: bilateral nares patent, midline septum, no rhinorrhea, no external deformity, no turbinate hypertrophy  OC/OP: MMM, no intraoral lesions, FOM/BOT soft, no trismus, dentition is moderate; tonsils 2+ bilaterally  Neck: soft, supple, no LAD, normal ROM, palpable thyroid with palpable right lower nodule on full next extension  Respirator: nonlabored, no stridor  Cardiovascular: RRR  Assessment/Plan  44y F with right thyroid nodule meeting criteria for biopsy now s/p FNA with result of benign follicular nodule with cystic degeneration. TFTs 4/10/19: WNL T4 0.95 and TSH 0.465.  ?  ?   - U/S in 6 months  - RTC 6 months  ?   Charles Edwards MD  [employer] Otolaryngology PGY-3   Problem List/Past Medical History  Ongoing  Anxiety  AR (allergic rhinitis)  Constipation  Depression  GERD (gastroesophageal reflux disease)  Glaucoma  H/O intracranial hemorrhage  Headache  Hemorrhoids  Hernia, hiatal  Hypertension  Insomnia  Iron deficiency anemia  PUD (peptic ulcer disease)  Stroke  Thyroid nodule  Historical  Benign cyst of breast  Pulmonary nodule seen on imaging study  Procedure/Surgical History  Excision of Right Thyroid Gland Lobe, Percutaneous Approach, Diagnostic (01/13/2020)  Fine needle aspiration biopsy, including ultrasound guidance; first lesion (01/13/2020)  Colonoscopy (None)  (2019)  Colonoscopy, flexible; diagnostic, including collection of specimen(s) by brushing or washing, when performed (separate procedure) (2019)  Esophagogastroduodenoscopy (2019)  Esophagogastroduodenoscopy, flexible, transoral; diagnostic, including collection of specimen(s) by brushing or washing, when performed (separate procedure) (2019)  Inspection of Lower Intestinal Tract, Via Natural or Artificial Opening Endoscopic (2019)  Inspection of Upper Intestinal Tract, Via Natural or Artificial Opening Endoscopic (2019)  Ultrasound, soft tissues of head and neck (eg, thyroid, parathyroid, parotid), B-scan and/or real time with image documentation (2019)  Ultrasound, soft tissues of head and neck (eg, thyroid, parathyroid, parotid), B-scan and/or real time with image documentation (2019)  OUTSIDE FILM CT CHEST (10/05/2018)  OUTSIDE FILM CT CHEST (10/05/2018)  79019 - MASTOPEXY (Bilateral) (2017)  38096 - BILAT MAMMAPLASTY W/ IMP (Bilateral) (2017)  Alteration of Bilateral Breast with Synthetic Substitute, Open Approach (2017)  Mammaplasty, augmentation; with prosthetic implant (2017)  Mastopexy (2017)  Computed tomography, abdomen; with contrast material(s) (2015)   x 2  Cholecystectomy;   Medications  acetaminophen/butalbital/caffeine 325 mg-50 mg-40 mg oral tablet, 1 tab(s), Oral, q4hr, 1 refills  Aricept 5 mg oral tablet, 5 mg= 1 tab(s), Oral, Once a day (at bedtime), 1 refills  clotrimazole 1% topical cream, 1 leydi, TOP, BID,? ?Not taking  fluticasone 50 mcg/inh nasal spray, 1 spray(s), Nasal, Daily, 4 refills  hydrocortisone-lidocaine 0.5%-3% rectal cream, 1 leydi, MT (rectal), BID  latanoprost 0.005% ophthalmic solution,? ?Not Taking, Completed Rx  loratadine 10 mg oral tablet, 10 mg= 1 tab(s), Oral, Daily, 1 refills  metoprolol tartrate 25 mg oral tab, 25 mg= 1 tab(s), Oral, BID, 1 refills  mometasone 0.1% topical  cream, 1 leydi, TOP, Daily,? ?Not taking  Pantoprazole 40 mg ORAL EC-Tablet, 40 mg= 1 tab(s), Oral, Daily, 2 refills  paroxetine 10 mg oral tablet, 10 mg= 1 tab(s), Oral, Daily, 6 refills  Allergies  No Known Allergies  Social History  Abuse/Neglect  No, 01/15/2020  Alcohol - No Risk, 11/03/2015  Past, 12/18/2018  Employment/School  Unemployed, 12/18/2018  Employed, Activity level: Occasional physical work. Highest education level: High school. Operates hazardous equipment: No., 11/03/2015  Exercise  Self assessment: Fair condition., 03/08/2017  Home/Environment  Lives with Children. Living situation: Home/Independent. Alcohol abuse in household: No. Substance abuse in household: No. Smoker in household: No. Injuries/Abuse/Neglect in household: No. Feels unsafe at home: No. Safe place to go: Yes. Family/Friends available for support: Yes. Concern for family members at home: No. Major illness in household: No. Financial concerns: No. TV/Computer concerns: No., 03/16/2017  Nutrition/Health  Regular, Caffeine intake amount: Sometimes coffee. Wants to lose weight: No. Sleeping concerns: No. Feels highly stressed: No., 03/08/2017  Sexual  Sexually active: Yes. Sexually active at age 28 Years. History of sexual abuse: No., 09/13/2017  Substance Use - No Risk, 11/03/2015  Never, 06/19/2018  Tobacco  Never (less than 100 in lifetime), N/A, 01/15/2020  Family History  Cancer: Father.  Hypertension.: Father and Brother.  Pancreatic cancer: Father.  Pancreatic cancer: Father.  Immunizations  Vaccine Date Status   tetanus/diphtheria/pertussis, acel(Tdap) 03/08/2017 Given   varicella virus vaccine 12/08/2005 Recorded   influenza virus vaccine, inactivated 11/18/2005 Recorded   varicella virus vaccine 04/30/2004 Recorded   measles/mumps/rubella virus vaccine 04/30/2004 Recorded   Health Maintenance  Health Maintenance  ???Pending?(in the next year)  ??? ??OverDue  ??? ? ? ?Coronary Artery Disease Maintenance-Antiplatelet Agent  Prescribed due??and every?  ??? ? ? ?Diabetes Screening due??and every?  ??? ??Due?  ??? ? ? ?Alcohol Misuse Screening due??01/01/20??and every 1??year(s)  ??? ? ? ?Coronary Artery Disease Maintenance-Lipid Lowering Therapy due??01/29/20??and every?  ??? ??Due In Future?  ??? ? ? ?Cervical Cancer Screening not due until??03/30/20??and every 3??year(s)  ??? ? ? ?Smoking Cessation (Coronary Artery Disease) not due until??08/15/20??and every 2??year(s)  ??? ? ? ?Hypertension Management-BMP not due until??11/13/20??and every 1??year(s)  ??? ? ? ?Hypertension Management-Education not due until??11/14/20??and every 1??year(s)  ??? ? ? ?Obesity Screening not due until??01/01/21??and every 1??year(s)  ??? ? ? ?ADL Screening not due until??01/15/21??and every 1??year(s)  ??? ? ? ?Blood Pressure Screening not due until??01/28/21??and every 1??year(s)  ??? ? ? ?Body Mass Index Check not due until??01/28/21??and every 1??year(s)  ??? ? ? ?Hypertension Management-Blood Pressure not due until??01/28/21??and every 1??year(s)  ???Satisfied?(in the past 1 year)  ??? ??Satisfied?  ??? ? ? ?ADL Screening on??01/15/20.??Satisfied by Rebeka Starr LPN  ??? ? ? ?Blood Pressure Screening on??01/29/20.??Satisfied by Lucy Pierce RN  ??? ? ? ?Body Mass Index Check on??01/29/20.??Satisfied by Lucy Pierce RN  ??? ? ? ?Breast Cancer Screening on??12/05/19.??Satisfied by Yohan RT, Karina S.  ??? ? ? ?Depression Screening on??01/15/20.??Satisfied by Rebeka Starr LPN  ??? ? ? ?Diabetes Screening on??11/14/19.??Satisfied by Marimar Larson  ??? ? ? ?Hypertension Management-Blood Pressure on??01/29/20.??Satisfied by Lucy Pierce RN.  ??? ? ? ?Influenza Vaccine on??01/15/20.??Satisfied by Rebeka Starr LPN  ??? ? ? ?Obesity Screening on??01/29/20.??Satisfied by Lucy Pierce RN.  ??? ??Refused?  ??? ? ? ?Influenza Vaccine on??11/14/19.??Recorded by Kari MARTINEZ-TAYLOR, Yulissa SALES  ?      I reviewed the history, exam, assessment,  and plan in the resident?s note?and agree. ?I actively participated in this patient?s care and helped to formulate the plan of care.  ?

## 2022-05-05 NOTE — HISTORICAL [facility]
This is a historical note converted from [employer]. Formatting and pictures may have been removed.  Please reference [employer] for original formatting and attached multimedia. Chief Complaint  here for colonoscopy/EGD  History of Present Illness  43yo F with PMHx of HTN, CVA (2017), severe iron deficiency anemia & monoclonal gammopathy, thyroid nodule  currently being followed by Hematology &?treated with IV iron infusions  patient has significant GERD despite medical therapy  also reports hemorrhoids with small amount of bleeding  referred for EGD/colonoscopy as workup for severe anemia  completed colon prep  has been off ASA x 1 month  colon cancer in aunt younger than 50, no first degree relatives  Review of Systems  Constitutional:?no fever, fatigue, weakness  ENMT:?no sore throat, ear pain, sinus pain/congestion, nasal congestion/drainage  Respiratory:?no cough, no wheezing, no shortness of breath  Cardiovascular:?no chest pain, no palpitations, no edema  Gastrointestinal:?no nausea, vomiting, or diarrhea. No abdominal pain  Genitourinary:?no dysuria, no urinary frequency or urgency, no hematuria  Musculoskeletal:?no muscle or joint pain, no joint swelling  Integumentary:?no skin rash or abnormal lesion  Neurologic: some numbness of left side  Physical Exam  Vitals & Measurements  T:?36.6? ?C (Oral)? HR:?68(Monitored)? RR:?18? BP:?120/91? SpO2:?100%? WT:?66.6?kg?  General:?well-developed well-nourished in no acute distress  Eye: EOMI, clear conjunctiva, eyelids normal  HENT:?normocephalic  Respiratory:?clear to auscultation bilaterally, no wheezing or crackles  Cardiovascular:?regular rate and rhythm without murmurs, gallops or rubs; no peripheral edema  Gastrointestinal:?soft, non-tender, non-distended with normal bowel sounds, without masses to palpation  Musculoskeletal:?moves all extremities appropriately?& with ease;  strength equal bilaterally  Integumentary: warm, dry, intact  Neurologic: no focal  deficits  Assessment/Plan  1. Colonoscopy/EGD  - f/u results with PCP   Problem List/Past Medical History  Ongoing  Anxiety  AR (allergic rhinitis)  Benign cyst of breast  Constipation  Depression  GERD (gastroesophageal reflux disease)  Glaucoma  H/O intracranial hemorrhage  Headache  Hemorrhoids  Hypertension  Insomnia  Iron deficiency anemia  PUD (peptic ulcer disease)  Stroke  Ulcer  Urinary urgency  Historical  No qualifying data  Procedure/Surgical History   - MASTOPEXY (Bilateral) (2017)   - BILAT MAMMAPLASTY W/ IMP (Bilateral) (2017)  Alteration of Bilateral Breast with Synthetic Substitute, Open Approach (2017)  Mammaplasty, augmentation; with prosthetic implant (2017)  Mastopexy (2017)   x 2  Cholecystectomy;   Medications  Inpatient  buffered lidocaine 2% - 0.5 ml syringe, 10 mg= 0.5 mL, Subcutaneous, As Directed  [id number] 1,000 mL, 1000 mL, IV  Zofran, 4 mg= 2 mL, IV Push, Once  Home  acetaminophen/butalbital/caffeine 325 mg-50 mg-40 mg oral tablet, 1 tab(s), Oral, q4hr, 1 refills  Americaine Hemorrhoidal 20% rectal ointment, 1 leydi, TOP, QID, PRN  Anusol Plus 1%-0.5% topical ointment, See Instructions  Anusol-HC 2.5% rectal cream with applicator, 1 leydi, [place] (rectal), BID  Aricept 5 mg oral tablet, 5 mg= 1 tab(s), Oral, Once a day (at bedtime), 1 refills  Claritin 10 mg oral tablet, 10 mg= 1 tab(s), Oral, Daily, 1 refills  fluticasone 50 mcg/inh nasal spray, 1 spray(s), Nasal, Daily, 4 refills  latanoprost 0.005% ophthalmic solution  metoprolol tartrate 25 mg oral tab, 25 mg= 1 tab(s), Oral, BID, 1 refills  mometasone 0.1% topical ointment, See Instructions  MoviPrep oral powder for reconstitution,? ?Not taking  Pantoprazole 40 mg ORAL EC-Tablet, 40 mg= 1 tab(s), Oral, Daily  Paxil 10 mg oral tablet, 10 mg= 1 tab(s), Oral, Daily, 1 refills  Allergies  No Known Allergies  Social History  Alcohol - No Risk, 2015  Past,  12/18/2018  Employment/School  Unemployed, 12/18/2018  Employed, Activity level: Occasional physical work. Highest education level: High school. Operates hazardous equipment: No., 11/03/2015  Exercise  Self assessment: Fair condition., 03/08/2017  Home/Environment  Lives with Children. Living situation: Home/Independent. Alcohol abuse in household: No. Substance abuse in household: No. Smoker in household: No. Injuries/Abuse/Neglect in household: No. Feels unsafe at home: No. Safe place to go: Yes. Family/Friends available for support: Yes. Concern for family members at home: No. Major illness in household: No. Financial concerns: No. TV/Computer concerns: No., 03/16/2017  Nutrition/Health  Regular, Caffeine intake amount: Sometimes coffee. Wants to lose weight: No. Sleeping concerns: No. Feels highly stressed: No., 03/08/2017  Sexual  Sexually active: Yes. Sexually active at age 28 Years. History of sexual abuse: No., 09/13/2017  Substance Abuse - No Risk, 11/03/2015  Never, 12/18/2018  Never, 06/19/2018  Tobacco  Never (less than 100 in lifetime), N/A, 05/02/2019  Family History  Cancer: Father.  Hypertension.: Father and Brother.  Pancreatic cancer: Father.  Pancreatic cancer: Father.  Immunizations  Vaccine Date Status   tetanus/diphtheria/pertussis, acel(Tdap) 03/08/2017 Given

## 2022-07-05 ENCOUNTER — HOSPITAL ENCOUNTER (OUTPATIENT)
Facility: HOSPITAL | Age: 47
Discharge: HOME | End: 2022-07-06
Attending: EMERGENCY MEDICINE | Admitting: OBSTETRICS & GYNECOLOGY
Payer: MEDICAID

## 2022-07-05 DIAGNOSIS — N93.9 ABNORMAL UTERINE BLEEDING: Primary | ICD-10-CM

## 2022-07-05 DIAGNOSIS — D64.9 SYMPTOMATIC ANEMIA: ICD-10-CM

## 2022-07-05 DIAGNOSIS — N93.8 DYSFUNCTIONAL UTERINE BLEEDING: ICD-10-CM

## 2022-07-05 LAB
ALBUMIN SERPL-MCNC: 3.5 GM/DL (ref 3.5–5)
ALBUMIN/GLOB SERPL: 1 RATIO (ref 1.1–2)
ALP SERPL-CCNC: 68 UNIT/L (ref 40–150)
ALT SERPL-CCNC: 16 UNIT/L (ref 0–55)
APPEARANCE UR: ABNORMAL
AST SERPL-CCNC: 23 UNIT/L (ref 5–34)
B-HCG SERPL QL: NEGATIVE
BACTERIA #/AREA URNS AUTO: ABNORMAL /HPF
BASOPHILS # BLD AUTO: 0.04 X10(3)/MCL (ref 0–0.2)
BASOPHILS NFR BLD AUTO: 0.5 %
BILIRUB UR QL STRIP.AUTO: ABNORMAL MG/DL
BILIRUBIN DIRECT+TOT PNL SERPL-MCNC: 0.3 MG/DL
BUN SERPL-MCNC: 11.5 MG/DL (ref 7–18.7)
CALCIUM SERPL-MCNC: 9 MG/DL (ref 8.4–10.2)
CHLORIDE SERPL-SCNC: 108 MMOL/L (ref 98–107)
CO2 SERPL-SCNC: 26 MMOL/L (ref 22–29)
COLOR UR AUTO: ABNORMAL
CREAT SERPL-MCNC: 0.65 MG/DL (ref 0.55–1.02)
EOSINOPHIL # BLD AUTO: 0.16 X10(3)/MCL (ref 0–0.9)
EOSINOPHIL NFR BLD AUTO: 2.2 %
ERYTHROCYTE [DISTWIDTH] IN BLOOD BY AUTOMATED COUNT: 13 % (ref 11.5–17)
GLOBULIN SER-MCNC: 3.6 GM/DL (ref 2.4–3.5)
GLUCOSE SERPL-MCNC: 100 MG/DL (ref 74–100)
GLUCOSE UR QL STRIP.AUTO: NEGATIVE MG/DL
HCT VFR BLD AUTO: 28.3 % (ref 37–47)
HGB BLD-MCNC: 9.3 GM/DL (ref 12–16)
IMM GRANULOCYTES # BLD AUTO: 0.01 X10(3)/MCL (ref 0–0.04)
IMM GRANULOCYTES NFR BLD AUTO: 0.1 %
KETONES UR QL STRIP.AUTO: NEGATIVE MG/DL
LEUKOCYTE ESTERASE UR QL STRIP.AUTO: ABNORMAL UNIT/L
LYMPHOCYTES # BLD AUTO: 2.43 X10(3)/MCL (ref 0.6–4.6)
LYMPHOCYTES NFR BLD AUTO: 33.4 %
MCH RBC QN AUTO: 29.2 PG (ref 27–31)
MCHC RBC AUTO-ENTMCNC: 32.9 MG/DL (ref 33–36)
MCV RBC AUTO: 89 FL (ref 80–94)
MONOCYTES # BLD AUTO: 0.58 X10(3)/MCL (ref 0.1–1.3)
MONOCYTES NFR BLD AUTO: 8 %
NEUTROPHILS # BLD AUTO: 4.1 X10(3)/MCL (ref 2.1–9.2)
NEUTROPHILS NFR BLD AUTO: 55.8 %
NITRITE UR QL STRIP.AUTO: POSITIVE
NRBC BLD AUTO-RTO: 0 %
PH UR STRIP.AUTO: 5 [PH]
PLATELET # BLD AUTO: 283 X10(3)/MCL (ref 130–400)
PMV BLD AUTO: 11.5 FL (ref 7.4–10.4)
POTASSIUM SERPL-SCNC: 4.5 MMOL/L (ref 3.5–5.1)
PROT SERPL-MCNC: 7.1 GM/DL (ref 6.4–8.3)
PROT UR QL STRIP.AUTO: ABNORMAL MG/DL
RBC # BLD AUTO: 3.18 X10(6)/MCL (ref 4.2–5.4)
RBC #/AREA URNS AUTO: 75 /HPF
RBC UR QL AUTO: ABNORMAL UNIT/L
SODIUM SERPL-SCNC: 140 MMOL/L (ref 136–145)
SP GR UR STRIP.AUTO: 1.02 (ref 1–1.03)
SQUAMOUS #/AREA URNS AUTO: <5 /HPF
UROBILINOGEN UR STRIP-ACNC: 0.2 MG/DL
WBC # SPEC AUTO: 7.3 X10(3)/MCL (ref 4.5–11.5)
WBC #/AREA URNS AUTO: 12 /HPF

## 2022-07-05 PROCEDURE — 81001 URINALYSIS AUTO W/SCOPE: CPT | Performed by: NURSE PRACTITIONER

## 2022-07-05 PROCEDURE — 81025 URINE PREGNANCY TEST: CPT | Performed by: NURSE PRACTITIONER

## 2022-07-05 PROCEDURE — 99285 EMERGENCY DEPT VISIT HI MDM: CPT | Mod: 25

## 2022-07-05 PROCEDURE — 86920 COMPATIBILITY TEST SPIN: CPT | Performed by: EMERGENCY MEDICINE

## 2022-07-05 PROCEDURE — 86850 RBC ANTIBODY SCREEN: CPT | Performed by: EMERGENCY MEDICINE

## 2022-07-05 PROCEDURE — 96360 HYDRATION IV INFUSION INIT: CPT

## 2022-07-05 PROCEDURE — 80053 COMPREHEN METABOLIC PANEL: CPT | Performed by: NURSE PRACTITIONER

## 2022-07-05 PROCEDURE — 85014 HEMATOCRIT: CPT | Mod: 91 | Performed by: EMERGENCY MEDICINE

## 2022-07-05 PROCEDURE — 85025 COMPLETE CBC W/AUTO DIFF WBC: CPT | Performed by: NURSE PRACTITIONER

## 2022-07-05 PROCEDURE — 36415 COLL VENOUS BLD VENIPUNCTURE: CPT | Performed by: NURSE PRACTITIONER

## 2022-07-05 PROCEDURE — 81003 URINALYSIS AUTO W/O SCOPE: CPT | Performed by: NURSE PRACTITIONER

## 2022-07-05 PROCEDURE — 36415 COLL VENOUS BLD VENIPUNCTURE: CPT | Performed by: EMERGENCY MEDICINE

## 2022-07-05 RX ORDER — LORATADINE 10 MG/1
10 TABLET ORAL
COMMUNITY
Start: 2021-07-12 | End: 2023-03-15 | Stop reason: SDUPTHER

## 2022-07-05 RX ORDER — PAROXETINE 10 MG/1
10 TABLET, FILM COATED ORAL DAILY
COMMUNITY
Start: 2022-06-19 | End: 2023-01-27

## 2022-07-05 RX ORDER — METOPROLOL TARTRATE 25 MG/1
25 TABLET, FILM COATED ORAL 2 TIMES DAILY
COMMUNITY
Start: 2022-06-19 | End: 2023-02-27 | Stop reason: SDUPTHER

## 2022-07-05 RX ORDER — SUCRALFATE 1 G/1
1 TABLET ORAL 2 TIMES DAILY
COMMUNITY
Start: 2022-06-19

## 2022-07-05 RX ORDER — GABAPENTIN 300 MG/1
300 CAPSULE ORAL NIGHTLY
COMMUNITY
Start: 2022-06-19 | End: 2023-06-06

## 2022-07-05 RX ORDER — PRAVASTATIN SODIUM 20 MG/1
20 TABLET ORAL
COMMUNITY
Start: 2021-05-21 | End: 2023-06-06

## 2022-07-05 RX ADMIN — SODIUM CHLORIDE, POTASSIUM CHLORIDE, SODIUM LACTATE AND CALCIUM CHLORIDE 1000 ML: 600; 310; 30; 20 INJECTION, SOLUTION INTRAVENOUS at 11:07

## 2022-07-05 NOTE — Clinical Note
Diagnosis: Dysfunctional uterine bleeding [607307]   Future Attending Provider: HALEY WILSON [[id number]]   Admitting Provider:: HALEY WILSON [[id number]]

## 2022-07-05 NOTE — FIRST PROVIDER EVALUATION
Medical screening exam completed.  I have conducted a focused provider triage encounter, findings are as follows:    Brief history of present illness:  Patient states vaginal bleeding and weakness.     There were no vitals filed for this visit.    Pertinent physical exam:  Awake, alert, ambulatory    Brief workup plan:  labs    Preliminary workup initiated; this workup will be continued and followed by the physician or advanced practice provider that is assigned to the patient when roomed.

## 2022-07-06 VITALS
OXYGEN SATURATION: 100 % | DIASTOLIC BLOOD PRESSURE: 81 MMHG | HEART RATE: 76 BPM | TEMPERATURE: 98 F | SYSTOLIC BLOOD PRESSURE: 124 MMHG | RESPIRATION RATE: 18 BRPM

## 2022-07-06 DIAGNOSIS — N93.9 ABNORMAL UTERINE BLEEDING (AUB): ICD-10-CM

## 2022-07-06 DIAGNOSIS — D64.9 SYMPTOMATIC ANEMIA: Primary | ICD-10-CM

## 2022-07-06 LAB
ABO + RH BLD: NORMAL
BASOPHILS # BLD AUTO: 0.05 X10(3)/MCL (ref 0–0.2)
BASOPHILS NFR BLD AUTO: 1 %
BLD PROD TYP BPU: NORMAL
BLOOD UNIT EXPIRATION DATE: NORMAL
BLOOD UNIT TYPE CODE: 6200
CROSSMATCH INTERPRETATION: NORMAL
DISPENSE STATUS: NORMAL
EOSINOPHIL # BLD AUTO: 0.13 X10(3)/MCL (ref 0–0.9)
EOSINOPHIL NFR BLD AUTO: 2.6 %
ERYTHROCYTE [DISTWIDTH] IN BLOOD BY AUTOMATED COUNT: 13 % (ref 11.5–17)
GROUP & RH: NORMAL
HCT VFR BLD AUTO: 27.1 % (ref 37–47)
HCT VFR BLD AUTO: 27.4 % (ref 37–47)
HGB BLD-MCNC: 8.4 GM/DL (ref 12–16)
HGB BLD-MCNC: 9 GM/DL (ref 12–16)
IMM GRANULOCYTES # BLD AUTO: 0.01 X10(3)/MCL (ref 0–0.04)
IMM GRANULOCYTES NFR BLD AUTO: 0.2 %
INDIRECT COOMBS GEL: NORMAL
LYMPHOCYTES # BLD AUTO: 1.59 X10(3)/MCL (ref 0.6–4.6)
LYMPHOCYTES NFR BLD AUTO: 32.3 %
MCH RBC QN AUTO: 28.7 PG (ref 27–31)
MCHC RBC AUTO-ENTMCNC: 32.8 MG/DL (ref 33–36)
MCV RBC AUTO: 87.3 FL (ref 80–94)
MONOCYTES # BLD AUTO: 0.54 X10(3)/MCL (ref 0.1–1.3)
MONOCYTES NFR BLD AUTO: 11 %
NEUTROPHILS # BLD AUTO: 2.6 X10(3)/MCL (ref 2.1–9.2)
NEUTROPHILS NFR BLD AUTO: 52.9 %
NRBC BLD AUTO-RTO: 0 %
PLATELET # BLD AUTO: 242 X10(3)/MCL (ref 130–400)
PMV BLD AUTO: 11.3 FL (ref 7.4–10.4)
RBC # BLD AUTO: 3.14 X10(6)/MCL (ref 4.2–5.4)
T4 FREE SERPL-MCNC: 0.81 NG/DL (ref 0.7–1.48)
TSH SERPL-ACNC: 1.58 UIU/ML (ref 0.35–4.94)
UNIT NUMBER: NORMAL
WBC # SPEC AUTO: 4.9 X10(3)/MCL (ref 4.5–11.5)

## 2022-07-06 PROCEDURE — 36430 TRANSFUSION BLD/BLD COMPNT: CPT

## 2022-07-06 PROCEDURE — 63600175 PHARM REV CODE 636 W HCPCS: Performed by: EMERGENCY MEDICINE

## 2022-07-06 PROCEDURE — 84439 ASSAY OF FREE THYROXINE: CPT | Performed by: OBSTETRICS & GYNECOLOGY

## 2022-07-06 PROCEDURE — 96361 HYDRATE IV INFUSION ADD-ON: CPT

## 2022-07-06 PROCEDURE — 25000003 PHARM REV CODE 250: Performed by: EMERGENCY MEDICINE

## 2022-07-06 PROCEDURE — G0378 HOSPITAL OBSERVATION PER HR: HCPCS

## 2022-07-06 PROCEDURE — 84443 ASSAY THYROID STIM HORMONE: CPT | Performed by: OBSTETRICS & GYNECOLOGY

## 2022-07-06 PROCEDURE — 36415 COLL VENOUS BLD VENIPUNCTURE: CPT | Performed by: OBSTETRICS & GYNECOLOGY

## 2022-07-06 PROCEDURE — P9016 RBC LEUKOCYTES REDUCED: HCPCS | Performed by: EMERGENCY MEDICINE

## 2022-07-06 PROCEDURE — 85025 COMPLETE CBC W/AUTO DIFF WBC: CPT | Performed by: OBSTETRICS & GYNECOLOGY

## 2022-07-06 RX ORDER — METOPROLOL TARTRATE 25 MG/1
25 TABLET, FILM COATED ORAL 2 TIMES DAILY
Status: DISCONTINUED | OUTPATIENT
Start: 2022-07-06 | End: 2022-07-06 | Stop reason: HOSPADM

## 2022-07-06 RX ORDER — MEDROXYPROGESTERONE ACETATE 10 MG/1
10 TABLET ORAL 3 TIMES DAILY
Status: DISCONTINUED | OUTPATIENT
Start: 2022-07-06 | End: 2022-07-06 | Stop reason: HOSPADM

## 2022-07-06 RX ORDER — MEDROXYPROGESTERONE ACETATE 10 MG/1
10 TABLET ORAL 3 TIMES DAILY
Qty: 90 TABLET | Refills: 0 | Status: SHIPPED | OUTPATIENT
Start: 2022-07-06 | End: 2022-07-18

## 2022-07-06 RX ORDER — PRAVASTATIN SODIUM 10 MG/1
20 TABLET ORAL DAILY
Status: DISCONTINUED | OUTPATIENT
Start: 2022-07-06 | End: 2022-07-06 | Stop reason: HOSPADM

## 2022-07-06 RX ORDER — GABAPENTIN 300 MG/1
300 CAPSULE ORAL NIGHTLY
Status: DISCONTINUED | OUTPATIENT
Start: 2022-07-07 | End: 2022-07-06 | Stop reason: HOSPADM

## 2022-07-06 RX ORDER — MEDROXYPROGESTERONE ACETATE 10 MG/1
10 TABLET ORAL 3 TIMES DAILY
Qty: 90 TABLET | Refills: 0 | Status: SHIPPED | OUTPATIENT
Start: 2022-07-06 | End: 2022-07-06 | Stop reason: SDUPTHER

## 2022-07-06 RX ORDER — HYDROCODONE BITARTRATE AND ACETAMINOPHEN 500; 5 MG/1; MG/1
TABLET ORAL
Status: DISCONTINUED | OUTPATIENT
Start: 2022-07-06 | End: 2022-07-06 | Stop reason: HOSPADM

## 2022-07-06 RX ORDER — ONDANSETRON 4 MG/1
8 TABLET, ORALLY DISINTEGRATING ORAL EVERY 8 HOURS PRN
Status: DISCONTINUED | OUTPATIENT
Start: 2022-07-06 | End: 2022-07-06 | Stop reason: HOSPADM

## 2022-07-06 RX ORDER — PAROXETINE 10 MG/1
10 TABLET, FILM COATED ORAL DAILY
Status: DISCONTINUED | OUTPATIENT
Start: 2022-07-06 | End: 2022-07-06 | Stop reason: HOSPADM

## 2022-07-06 RX ORDER — SODIUM CHLORIDE 0.9 % (FLUSH) 0.9 %
10 SYRINGE (ML) INJECTION
Status: DISCONTINUED | OUTPATIENT
Start: 2022-07-06 | End: 2022-07-06 | Stop reason: HOSPADM

## 2022-07-06 RX ORDER — PROCHLORPERAZINE EDISYLATE 5 MG/ML
5 INJECTION INTRAMUSCULAR; INTRAVENOUS EVERY 6 HOURS PRN
Status: DISCONTINUED | OUTPATIENT
Start: 2022-07-06 | End: 2022-07-06 | Stop reason: HOSPADM

## 2022-07-06 RX ADMIN — SODIUM CHLORIDE: 9 INJECTION, SOLUTION INTRAVENOUS at 02:07

## 2022-07-06 NOTE — DISCHARGE SUMMARY
Antepartum Discharge Summary     Patient ID:   Pretty Pratt  [id number]  47 y.o.  1975    Admit Date: 7/5/2022    Admission Diagnoses:  Dysfunctional uterine bleeding [N93.8]  Abnormal uterine bleeding [N93.9]    Admitting Physician: Kenia Booker MD    Discharge Date: 07/06/2022    Discharge Physician: Cam Vargas MD    Discharge Diagnoses:    Dysfunctional uterine bleeding [N93.8]  Abnormal uterine bleeding [N93.9]    Admission Condition: Stable.    Discharged Condition: Stable. No complaints this AM. Reports she feels better regarding her dizziness and lightheadedness. Continuing to have vaginal bleeding, reports change 3 pads overnight, but feels it is lighter now than it was yesterday. Ambulated well. Voiding and passing gas. Tolerating a regular diet. Pain well controlled. Meets all discharge criteria.  Denies fevers, chills, headache, blurry vision, nausea, vomiting, dizziness, or syncope.   Denies chest pain, shortness of breath, RUQ pain, or calf pain.      Vitals:    07/06/22 0316 07/06/22 0410 07/06/22 0420 07/06/22 0800   BP:  120/75 108/72 124/81   Pulse: 61 68 62 76   Resp:    18   Temp:  97.9 °F (36.6 °C)  98.2 °F (36.8 °C)   TempSrc:       SpO2: 100% 100% 100%        Physical Exam:   General: alert and oriented, in no acute distress   Lungs: clear to auscultation bilaterally   Heart:: regular rate and rhythm   Abdomen Soft, non-tender, no rebound tenderness, no guarding   DVT Evaluation: Normal, atraumatic, non-edematous, No cords or calf tenderness, No significant calf/ankle edema.  SCDs in place & functioning   Extremities: Normal, atraumatic, non-edematous     Brief Hospital Course: 47 y.o. F who presented on 7/6 to the ED with vaginal bleeding and weakness and dizziness and was admitted for symptomatic anemia. Reports cycles 1-2x/month, but the last two episodes the bleeding was heavier, and lasted longer than normal. Usually she has bleeding for 4 days 1-2x/mo, but last month  had bleeding for 6-7days, and this time 12 days. In the ED, her H/H was noted to decrease from 9.3/28.3 to 8.4/27.1.  She received 1u pRBC overnight. TVUS completed, unremarkable. This morning, patient reports improvement in symptoms, able to ambulate well, and reports bleeding is decreased. AM CBC is pending. Patient discharged with Provera 10mg TID for 10 days, and instructed to take daily vs cyclic Provera 10mg until follow up in GYN clinic. Patient counseled about long term options for management including Mirena IUD vs definitive management with hysterectomy. Ambulatory referral to GYN placed.     She was determined to be meeting all goals and discharged on HD#2.    Recent Labs   Lab 07/05/22  1732 07/05/22  2351   WBC 7.3  --    HGB 9.3* 8.4*     --    MCV 89.0  --          Discharge Medications:       Medication List      START taking these medications    medroxyPROGESTERone 10 MG tablet  Commonly known as: PROVERA  Take 1 tablet (10 mg total) by mouth 3 (three) times daily. Then take one tablet daily until able to be seen at [facility] GYN clinic for 10 days        CONTINUE taking these medications    gabapentin 300 MG capsule  Commonly known as: NEURONTIN     loratadine 10 mg tablet  Commonly known as: CLARITIN     metoprolol tartrate 25 MG tablet  Commonly known as: LOPRESSOR     paroxetine 10 MG tablet  Commonly known as: PAXIL     pravastatin 20 MG tablet  Commonly known as: PRAVACHOL     sucralfate 1 gram tablet  Commonly known as: CARAFATE           Where to Get Your Medications      These medications were sent to [facility]/pharmacy #[id number] - [address]  [address] [facility]  [address] [address]    Hours: 24-hours Phone: [phone]   · medroxyPROGESTERone 10 MG tablet         Follow-up Appointment:   Follow up as scheduled with [facility]    Other instructions:  Please return to ED for fever >100.4, heavy vaginal bleeding, intractable  nausea/vomiting, pain intolerable despite PO pain medications, new onset headaches, blurry vision, or any other acute concern.     Patient and plan discussed with Dr. Alicia Rehman MD  [facility] PGY2  07/06/2022 10:10 AM

## 2022-07-06 NOTE — PROGRESS NOTES
Faxed [facility] GYN referral   Dermal Autograft Text: The defect edges were debeveled with a #15 scalpel blade.  Given the location of the defect, shape of the defect and the proximity to free margins a dermal autograft was deemed most appropriate.  Using a sterile surgical marker, the primary defect shape was transferred to the donor site. The area thus outlined was incised deep to adipose tissue with a #15 scalpel blade.  The harvested graft was then trimmed of adipose and epidermal tissue until only dermis was left.  The skin graft was then placed in the primary defect and oriented appropriately.

## 2022-07-06 NOTE — ED PROVIDER NOTES
Encounter Date: 2022       History     Chief Complaint   Patient presents with    Vaginal Bleeding     Pt to ER via POV for vaginal bleeding.  Also weakness.  Started 11 days ago.  States PCP told her to come to the ER for eval     48 y/o  F presents to the ED on recommendation of her PCP for evaluation of abnormal vaginal bleeding - reports bleeding x 11 days, heavy at times. Reports feels week as well. Denies fever, chills. Some abdominal cramping but no severe pain.     The history is provided by the patient.   Vaginal Bleeding  This is a new problem. The current episode started more than 1 week ago. The problem occurs daily. The problem has not changed since onset.Pertinent negatives include no abdominal pain, no headaches and no shortness of breath. Nothing aggravates the symptoms. Nothing relieves the symptoms.     Review of patient's allergies indicates:  No Known Allergies  Past Medical History:   Diagnosis Date    Abnormal uterine bleeding (AUB)     Anemia     Hypertension     Stroke      Past Surgical History:   Procedure Laterality Date     SECTION       No family history on file.  Social History     Tobacco Use    Smoking status: Never Smoker    Smokeless tobacco: Never Used   Substance Use Topics    Alcohol use: Not Currently    Drug use: Never     Review of Systems   Constitutional: Positive for fatigue. Negative for chills and fever.   Respiratory: Negative for chest tightness and shortness of breath.    Gastrointestinal: Negative for abdominal pain, anal bleeding, nausea and vomiting.   Genitourinary: Positive for vaginal bleeding.   Neurological: Negative for headaches.   Hematological: Does not bruise/bleed easily.   All other systems reviewed and are negative.      Physical Exam     Initial Vitals [22 1652]   BP Pulse Resp Temp SpO2   108/72 81 18 97.7 °F (36.5 °C) 98 %      MAP       --         Physical Exam    Nursing note and vitals reviewed.  Constitutional: She  appears well-developed and well-nourished.   HENT:   Head: Normocephalic and atraumatic.   Mouth/Throat: Oropharynx is clear and moist.   Eyes: Conjunctivae are normal. Pupils are equal, round, and reactive to light.   Neck: Neck supple.   Normal range of motion.  Cardiovascular: Normal rate and normal heart sounds.   No murmur heard.  Pulmonary/Chest: Breath sounds normal. No respiratory distress.   Abdominal: Abdomen is soft. She exhibits no distension. There is no abdominal tenderness.   Genitourinary:    Pelvic exam was performed with patient supine.      Vaginal bleeding present.      No vaginal erythema.   There is bleeding in the vagina. No erythema in the vagina.    No signs of injury in the vagina.     Musculoskeletal:         General: Normal range of motion.      Cervical back: Normal range of motion and neck supple.           ED Course   Procedures  Labs Reviewed   COMPREHENSIVE METABOLIC PANEL - Abnormal; Notable for the following components:       Result Value    Chloride 108 (*)     Globulin 3.6 (*)     Albumin/Globulin Ratio 1.0 (*)     All other components within normal limits   URINALYSIS, REFLEX TO URINE CULTURE - Abnormal; Notable for the following components:    Color, UA Red (*)     Appearance, UA Turbid (*)     Protein, UA 2+ (*)     Blood, UA 2+ (*)     Bilirubin, UA 1+ (*)     Nitrites, UA Positive (*)     Leukocyte Esterase, UA 2+ (*)     All other components within normal limits   CBC WITH DIFFERENTIAL - Abnormal; Notable for the following components:    RBC 3.18 (*)     Hgb 9.3 (*)     Hct 28.3 (*)     MCHC 32.9 (*)     MPV 11.5 (*)     All other components within normal limits   URINALYSIS, MICROSCOPIC - Abnormal; Notable for the following components:    RBC, UA 75 (*)     WBC, UA 12 (*)     All other components within normal limits   HEMOGLOBIN AND HEMATOCRIT, BLOOD - Abnormal; Notable for the following components:    Hgb 8.4 (*)     Hct 27.1 (*)     All other components within normal  limits   HCG QUALITATIVE URINE - Normal   CULTURE, URINE   CBC W/ AUTO DIFFERENTIAL    Narrative:     The following orders were created for panel order CBC Auto Differential.  Procedure                               Abnormality         Status                     ---------                               -----------         ------                     CBC with Differential[[id number]]        Abnormal            Final result                 Please view results for these tests on the individual orders.   TYPE & SCREEN   PREPARE RBC SOFT          Imaging Results          US Pelvis Complete Non OB (Final result)  Result time 07/06/22 08:35:02    Final result by Miguel Ángel Burns MD (07/06/22 08:35:02)                 Impression:      Pelvic ultrasound is within normal limits for age.      Electronically signed by: Miguel Ángel Burns  Date:    07/06/2022  Time:    08:35             Narrative:    EXAMINATION:  US PELVIS COMPLETE NON OB    CLINICAL HISTORY:  vaginal bleeding;    COMPARISON:  31 October 2016    FINDINGS:  Transabdominal scanning of the pelvis with grayscale, color and spectral Doppler.    Anteverted uterus measures 8-9 cm in length.  Endometrium measures 6 mm which is normal.    The ovaries are normal in appearance for patient's age with vascular flow demonstrated.  The right ovary measures 2.8 x 1.4 x 1.2 cm.  The left ovary measures 3.8 x 2.8 x 1.8 cm.    There is no adnexal mass or significant free fluid.                    Preliminary result by Miguel Ángel Burns MD (07/06/22 01:24:19)                 Narrative:    START OF REPORT:  Technique: Transabdominal ultrasound of the pelvis was performed.    Comparison: None.    CLINICAL HISTORY: Heavy vaginal bleeding.    Findings:  Uterus: The uterus measures 8.5 x 5 x 6.5 cm. The endometrium measures 6 mm in thickness. This is within normal limits.  Adnexa: No adnexal masses are seen.  Right Ovary: The right ovary measures 2.8 x 1.4 x 1.2 cm. The right ovarian blood flow is  within normal limits.  Left Ovary: The left ovary measures 3.8 x 1.8 x 2.8 cm. The left ovarian blood flow is within normal limits. There is a cyst in the left ovary which measures 2.4 x 2 x 2.1 cm.  Fluid: No free fluid is seen in the pelvis.      Impression:  1. There is a cyst in the left ovary which measures 2.4 x 2 x 2.1 cm.  2. No specific evidence for ovarian torsion is identified. Details as above.                                   Medications   lactated ringers bolus 1,000 mL (0 mLs Intravenous Stopped 7/6/22 0123)     Medical Decision Making:   Initial Assessment:   Ms. Pratt presented to the emergency department for evaluation of heavy vaginal bleeding for over a week.  Some weakness but hemodynamically stable.  Will obtain labs and plan to discuss with gynecology.  Differential Diagnosis:   Dysfunctional uterine bleeding, perimenopausal bleeding, degenerating fibroid, symptomatic anemia  ED Management:  Labs with significant anemia compared to her baseline and with ongoing blood loss and symptoms, will transfuse.  Case discussed with gyn on-call who agreed with admission for observation, transfusion, she will discuss options with her regarding hormones, TXA, hysterectomy             ED Course as of 07/31/22 0751   Wed Jul 06, 2022   0142 Discussed with Dr. Booker who agrees w/ observation admission, rec 1 unit PRBC transfusion given symptomatic anemia and ongoing bleeding. Will discuss further hemostasis options with patient going forward.  [KS]      ED Course User Index  [KS] Paola Montalvo MD             Clinical Impression:   Final diagnoses:  [N93.8] Dysfunctional uterine bleeding  [D64.9] Symptomatic anemia          ED Disposition Condition    Observation               Paola Montalvo MD  07/31/22 0803

## 2022-07-06 NOTE — PLAN OF CARE
Problem: Adult Inpatient Plan of Care  Goal: Plan of Care Review  7/6/2022 0513 by Portia Hamilton RN  Outcome: Ongoing, Progressing  7/6/2022 0512 by Portia Hamilton RN  Outcome: Ongoing, Progressing  Goal: Patient-Specific Goal (Individualized)  7/6/2022 0513 by Portia Hamilton RN  Outcome: Ongoing, Progressing  7/6/2022 0512 by Portia Hamilton RN  Outcome: Ongoing, Progressing  Goal: Absence of Hospital-Acquired Illness or Injury  7/6/2022 0513 by Portia Hamilton RN  Outcome: Ongoing, Progressing  7/6/2022 0512 by Portia Hamilton RN  Outcome: Ongoing, Progressing  Goal: Optimal Comfort and Wellbeing  7/6/2022 0513 by Portia Hamilton RN  Outcome: Ongoing, Progressing  7/6/2022 0512 by Portia Hamilton RN  Outcome: Ongoing, Progressing  Goal: Readiness for Transition of Care  7/6/2022 0513 by Portia Hamilton RN  Outcome: Ongoing, Progressing  7/6/2022 0512 by Portia Hamilton RN  Outcome: Ongoing, Progressing     Problem: Bleeding Antepartum  Goal: Absence of Bleeding  7/6/2022 0513 by Portia Hamilton RN  Outcome: Ongoing, Progressing  7/6/2022 0512 by Portia Hamilton RN  Outcome: Ongoing, Progressing     Problem: Fall Injury Risk  Goal: Absence of Fall and Fall-Related Injury  Outcome: Ongoing, Progressing     Problem: Infection  Goal: Absence of Infection Signs and Symptoms  Outcome: Ongoing, Progressing  47 year old woman transferred from ED for heavy vaginal bleeding. Patient is overwhelmed with being admitted. Questions were answered. Oriented patient to room and plan of care. Patient seems content with no signs or complaints of pain.

## 2022-07-06 NOTE — H&P
[facility] - Emergency Dept  History & Physical  Gynecology    SUBJECTIVE:     Chief Complaint/Reason for Admission: symptomatic anemia, AUB/HMB    History of Present Illness:  Patient is a 47 y.o. No obstetric history on file. perimenopausal F presents with symptomatic anemia, HMB/AUB with Hb drop 9.3>8.4 in ED. She sees [facility] clinic for GYN care and has not mentioned her heavy cycles in past. She has hx CVA s/p RUE weakness on gabapenting, HTN on metoprolol, chronic anemia with hx transfusions, depression on paxil and HLD on pravastatin.  She reports acute increase in heavy bleeding over past few days. She feels weak/dizzy and lightheaded.    (Not in a hospital admission)      Review of patient's allergies indicates:  No Known Allergies    No past medical history on file.  No past surgical history on file.  No family history on file.  Social History     Tobacco Use    Smoking status: Never Smoker    Smokeless tobacco: Never Used       Review of Systems:  Constitutional symptoms:  No fever, no chills, no weakness, no fatigue.    Eye symptoms:  No pain, no blurred vision.    ENMT symptoms:  No ear pain, no nasal congestion.    Respiratory symptoms:  No shortness of breath, no cough.    Cardiovascular symptoms:  No chest pain, no syncope, no peripheral edema.    Gastrointestinal symptoms:  Abdominal pain, mild, epigastric, no nausea, no vomiting, no diarrhea.    Genitourinary symptoms:  No dysuria, no hematuria.    Musculoskeletal symptoms:  No back pain, no Muscle pain.    Neurologic symptoms:  No headache, no dizziness, no numbness, no tingling.    Psychiatric symptoms:  No anxiety, no depression.    Endocrine symptoms:  No polyuria, no polydipsia.    Hematologic/Lymphatic symptoms:  Bleeding tendency negative, bruising tendency negative.              Additional review of systems information: All other systems reviewed and otherwise negative.        OBJECTIVE:     Vital Signs (Most Recent):  Temp:  97.7 °F (36.5 °C) (07/05/22 1652)  Pulse: 79 (07/06/22 0123)  Resp: 16 (07/06/22 0123)  BP: 113/73 (07/06/22 0123)  SpO2: 99 % (07/06/22 0123)    Physical Exam:  GEN NAD  PULM unlabored  ABD S/NTTP/no rebound or guarding  EXT no C/C/trace edema      Laboratory:  Recent Results (from the past 336 hour(s))   CBC with Differential    Collection Time: 07/05/22  5:32 PM   Result Value Ref Range    WBC 7.3 4.5 - 11.5 x10(3)/mcL    Hgb 9.3 (L) 12.0 - 16.0 gm/dL    Hct 28.3 (L) 37.0 - 47.0 %    Platelet 283 130 - 400 x10(3)/mcL     Lab Results   Component Value Date    HGBA1C 5.3 11/04/2020     CMP  Sodium Level   Date Value Ref Range Status   07/05/2022 140 136 - 145 mmol/L Final     Potassium Level   Date Value Ref Range Status   07/05/2022 4.5 3.5 - 5.1 mmol/L Final     Carbon Dioxide   Date Value Ref Range Status   07/05/2022 26 22 - 29 mmol/L Final     Blood Urea Nitrogen   Date Value Ref Range Status   07/05/2022 11.5 7.0 - 18.7 mg/dL Final     Creatinine   Date Value Ref Range Status   07/05/2022 0.65 0.55 - 1.02 mg/dL Final     Calcium Level Total   Date Value Ref Range Status   07/05/2022 9.0 8.4 - 10.2 mg/dL Final     Albumin Level   Date Value Ref Range Status   07/05/2022 3.5 3.5 - 5.0 gm/dL Final     Bilirubin Total   Date Value Ref Range Status   07/05/2022 0.3 <=1.5 mg/dL Final     Alkaline Phosphatase   Date Value Ref Range Status   07/05/2022 68 40 - 150 unit/L Final     Aspartate Aminotransferase   Date Value Ref Range Status   07/05/2022 23 5 - 34 unit/L Final     Alanine Aminotransferase   Date Value Ref Range Status   07/05/2022 16 0 - 55 unit/L Final     Estimated GFR-Non    Date Value Ref Range Status   07/05/2022 >60 mls/min/1.73/m2 Final     Lab Results   Component Value Date    WBC 7.3 07/05/2022    HGB 8.4 (L) 07/05/2022    HCT 27.1 (L) 07/05/2022    MCV 89.0 07/05/2022     07/05/2022           Diagnostic Results:  Prelim  Findings:  Uterus: The uterus measures 8.5 x 5 x 6.5  cm. The endometrium measures 6 mm in thickness. This is within normal limits.  Adnexa: No adnexal masses are seen.  Right Ovary: The right ovary measures 2.8 x 1.4 x 1.2 cm. The right ovarian blood flow is within normal limits.  Left Ovary: The left ovary measures 3.8 x 1.8 x 2.8 cm. The left ovarian blood flow is within normal limits. There is a cyst in the left ovary which measures 2.4 x 2 x 2.1 cm.  Fluid: No free fluid is seen in the pelvis.        Impression:  1. There is a cyst in the left ovary which measures 2.4 x 2 x 2.1 cm.  2. No specific evidence for ovarian torsion is identified. Details as above.       ASSESSMENT/PLAN:     Active Hospital Problems    Diagnosis  POA    *Symptomatic anemia [D64.9]  Yes    Abnormal uterine bleeding [N93.9]  Unknown      Resolved Hospital Problems   No resolved problems to display.       Discussed perimenopause/AUB and treatment options  Transfuse 1u PRBC to begin in ED for symptomatic anemia  she does consent to blood transfusion if needed as life saving measure, RBA discussed to blood transfusion including but not limited to infection/reactions    Discussed medical and surgical options and reviewed RBA   She would like to begin provera 10mg TID PO for acute bleeding episode for 10days and then can continue 1 tab daily vs. Cyclic provera. She is also considering LNG IUD or definitive surgery with TLH. Likely needs medical clearance with her prior medical history.   Plan to keep NPO overnight incase of worsening bleeding can offer theraputic/diagnostic D&C if severe or life threatening, otherwise would consider outpatient management at this point with GYN clinic at [facility] and referral placed in discharge tab. Trend CBC post transfusion and continue to monitor VB with pad counts. TSH ordered to trend, her last TSH was normal in Nov 2021.    All questions and plan of care answered for patient to her satisfaction    Kenia Booker MD  OB/GYN Hospitalist  7/6/20222:31  AM

## 2022-07-07 LAB — BACTERIA UR CULT: NO GROWTH

## 2022-07-13 ENCOUNTER — OFFICE VISIT (OUTPATIENT)
Dept: GYNECOLOGY | Facility: CLINIC | Age: 47
End: 2022-07-13
Payer: MEDICAID

## 2022-07-13 VITALS
BODY MASS INDEX: 28.76 KG/M2 | TEMPERATURE: 98 F | DIASTOLIC BLOOD PRESSURE: 70 MMHG | RESPIRATION RATE: 18 BRPM | OXYGEN SATURATION: 98 % | SYSTOLIC BLOOD PRESSURE: 102 MMHG | HEIGHT: 62 IN | HEART RATE: 72 BPM | WEIGHT: 156.31 LBS

## 2022-07-13 DIAGNOSIS — N93.9 ABNORMAL UTERINE BLEEDING (AUB): ICD-10-CM

## 2022-07-13 DIAGNOSIS — D64.9 SYMPTOMATIC ANEMIA: ICD-10-CM

## 2022-07-13 DIAGNOSIS — D50.0 IRON DEFICIENCY ANEMIA DUE TO CHRONIC BLOOD LOSS: Primary | ICD-10-CM

## 2022-07-13 LAB
B-HCG UR QL: NEGATIVE
CTP QC/QA: YES

## 2022-07-13 PROCEDURE — 99214 OFFICE O/P EST MOD 30 MIN: CPT | Mod: PBBFAC

## 2022-07-13 PROCEDURE — 81025 URINE PREGNANCY TEST: CPT | Mod: PBBFAC

## 2022-07-13 PROCEDURE — 58100 BIOPSY OF UTERUS LINING: CPT | Mod: PBBFAC | Performed by: STUDENT IN AN ORGANIZED HEALTH CARE EDUCATION/TRAINING PROGRAM

## 2022-07-13 PROCEDURE — 87625 HPV TYPES 16 & 18 ONLY: CPT

## 2022-07-13 PROCEDURE — 87491 CHLMYD TRACH DNA AMP PROBE: CPT | Performed by: STUDENT IN AN ORGANIZED HEALTH CARE EDUCATION/TRAINING PROGRAM

## 2022-07-13 PROCEDURE — 87624 HPV HI-RISK TYP POOLED RSLT: CPT

## 2022-07-13 PROCEDURE — 87591 N.GONORRHOEAE DNA AMP PROB: CPT

## 2022-07-13 RX ORDER — LATANOPROST 50 UG/ML
1 SOLUTION/ DROPS OPHTHALMIC NIGHTLY
COMMUNITY
Start: 2022-01-20

## 2022-07-13 RX ORDER — EZETIMIBE 10 MG/1
10 TABLET ORAL DAILY
COMMUNITY
Start: 2022-06-03 | End: 2023-04-04

## 2022-07-13 RX ORDER — ONDANSETRON 4 MG/1
4 TABLET, FILM COATED ORAL 2 TIMES DAILY PRN
COMMUNITY
Start: 2022-06-25

## 2022-07-13 RX ORDER — ACETAMINOPHEN 325 MG/1
650 TABLET ORAL
Status: COMPLETED | OUTPATIENT
Start: 2022-07-13 | End: 2022-07-13

## 2022-07-13 RX ORDER — FLUTICASONE PROPIONATE 50 MCG
1 SPRAY, SUSPENSION (ML) NASAL DAILY
COMMUNITY
Start: 2022-04-11

## 2022-07-13 RX ADMIN — ACETAMINOPHEN 650 MG: 325 TABLET ORAL at 12:07

## 2022-07-13 NOTE — PROGRESS NOTES
[facility] GYNECOLOGY CLINIC NOTE     Pretty Pratt is a 47 y.o.  presenting to GYN clinic for initial workup of AUB - pt was admitted at [facility] on  for symptomatic anemia and received a blood transfusion of 1unit. Hgb/Hct at that time was 8.4/27.2.  Pt was discharged on provera 10mg TID x 10d and advised to take once daily after 10d course. Pt has only been taking pill once daily. Today, she reports bleeding has not improved, and she is soaking through 5 overnight pads/day.     PMH significant for CVA s/p RUE weakness on gabapentin, HTN on metoprolol, chronic anemia with hx transfusions, depression on paxil and HLD on pravastatin.     Gynecology  Denies h/o STIs and abnormal Paps - no recent Paps   Regular/7d duration   Denies personal or family history of uterine, ovarian, breast or colon cancer     OB History        2    Para   2    Term                AB        Living   2       SAB        IAB        Ectopic        Multiple        Live Births   2              C/S x 2     Past Medical History:   Diagnosis Date    Abnormal uterine bleeding (AUB)     Anemia     Hypertension     Stroke       Past Surgical History:   Procedure Laterality Date     SECTION        Current Outpatient Medications   Medication Instructions    ezetimibe (ZETIA) 10 mg, Oral, Daily    fluticasone propionate (FLONASE) 50 mcg/actuation nasal spray 1 spray, Each Nostril, Daily    gabapentin (NEURONTIN) 300 mg, Oral, Nightly    latanoprost 0.005 % ophthalmic solution 1 drop, Both Eyes, Nightly    loratadine (CLARITIN) 10 mg, Oral    medroxyPROGESTERone (PROVERA) 10 mg, Oral, 3 times daily, Then take one tablet daily until able to be seen at [facility] GYN clinic    metoprolol tartrate (LOPRESSOR) 25 mg, Oral, 2 times daily    ondansetron (ZOFRAN) 4 mg, Oral, 2 times daily PRN    paroxetine (PAXIL) 10 mg, Oral, Daily    pravastatin (PRAVACHOL) 20 mg, Oral    sucralfate (CARAFATE) 1 g, Oral, 2 times daily  "    Social History     Tobacco Use    Smoking status: Never Smoker    Smokeless tobacco: Never Used   Substance Use Topics    Alcohol use: Not Currently    Drug use: Never       Review of Systems  Pertinent items are noted in HPI.     Objective:     /70 (BP Location: Left arm, Patient Position: Sitting, BP Method: Small (Automatic))   Pulse 72   Temp 98.2 °F (36.8 °C)   Resp 18   Ht 5' 2" (1.575 m)   Wt 70.9 kg (156 lb 4.9 oz)   LMP 06/26/2022   SpO2 98%   BMI 28.59 kg/m²   Physical Exam:  Gen: Well-nourished, well-developed female appearing stated age. Alert, cooperative, in no acute distress.  Abdomen: Soft, non-tender, no masses.  Incisions: Well healed Pfannenstiel incision  Extrem: Extremities normal, atraumatic, non-tender calves.  External genitalia: Normal female genetalia without lesion, discharge or tenderness.   Speculum Exam: Vaginal vault without discharge, nonodorous, no lesions/masses seen.  Cervical os visualized as closed, no lesions/masses.   Bimanual Exam: No cervical motion tenderness.  Uterus freely mobile.  Normal size uterus. No adnexal masses.  Nontender exam.   Note: RN chaperone present for entirety of exam.    Relevant Labs (7/6/2022)  Hgb/Hct 9.0/27.4   TSH 1.58   Free T4 0.81     Relevant Imaging:  US Pelvis Complete Non OB  Narrative: EXAMINATION:  US PELVIS COMPLETE NON OB    CLINICAL HISTORY:  vaginal bleeding;    COMPARISON:  31 October 2016    FINDINGS:  Transabdominal scanning of the pelvis with grayscale, color and spectral Doppler.    Anteverted uterus measures 8-9 cm in length.  Endometrium measures 6 mm which is normal.    The ovaries are normal in appearance for patient's age with vascular flow demonstrated.  The right ovary measures 2.8 x 1.4 x 1.2 cm.  The left ovary measures 3.8 x 2.8 x 1.8 cm.    There is no adnexal mass or significant free fluid.  Impression: Pelvic ultrasound is within normal limits for age.    Electronically signed by: Miguel Ángel" Katy  Date:    2022  Time:    08:35        Procedures:     EMB Procedure Note    Date of procedure:  2022    Procedure(s):    EMB    Indication: AUB     Procedure technique:     Pretty Pratt was counseled on risks, benefits, alternatives to her procedure today. A consent form was reviewed and signed. All questions were answered to her satisfaction.    After discussing and obtaining informed consent, a time out was performed confirming her identity, surgical site, planned procedure and that all necessary equipment was available. She was positioned on the exam table in dorsal lithotomy position.   Speculum was placed with excellent visualization of the cervix. Cervix swabbed with betadine x 3. Single tooth tenaculum used to grasp the anterior lip of the cervix.  2 passes performed.  Specimen labeled and sent to pathology.  All instruments removed.  Tenaculum site hemostatic. Patient tolerated the procedure well.  Minimal EBL.  No complications.      Assessment:       47 y.o.  here for AUB-O and symptomatic anemia. Based on review of TVUS, suspect adenomyosis. EMB performed today given risk factors of age, HTN, and worsening bleeding profile.     Provera contraindicated for long term use with history of stroke (Level 3 per CDC). Will advise patient to discontinue. Recommend alternative of Lysteda for nonhormonal control of bleeding. Mirena IUD Level 2 safety per CDC (benefit may outweigh risks) - pt is amenable to IUD placement.     1. Iron deficiency anemia due to chronic blood loss  CBC Auto Differential    Iron and TIBC   2. Symptomatic anemia  Ambulatory referral/consult to Obstetrics / Gynecology    CBC Auto Differential    Iron and TIBC   3. Abnormal uterine bleeding (AUB)  Ambulatory referral/consult to Obstetrics / Gynecology    Liquid-Based Pap Smear, Screening Screening    Specimen to Pathology Gynecology and Obstetrics    CBC Auto Differential    Iron and TIBC          Plan:     --  Will call pt with results of EMB. If benign, will schedule patient for Mirena IUD placement. Pt is aware that findings of cancer would necessitate referral to GYN ONC.     Problem List Items Addressed This Visit        Oncology    Symptomatic anemia    Relevant Orders    CBC Auto Differential (Completed)    Iron and TIBC (Completed)      Other Visit Diagnoses     Iron deficiency anemia due to chronic blood loss    -  Primary    Relevant Orders    CBC Auto Differential (Completed)    Iron and TIBC (Completed)    Abnormal uterine bleeding (AUB)        Relevant Orders    Liquid-Based Pap Smear, Screening Screening    Specimen to Pathology Gynecology and Obstetrics    CBC Auto Differential (Completed)    Iron and TIBC (Completed)           Return to clinic prn for IUD placement; well woman exam with NP in 1 year    Discussed patient and plan with Dr. Chris,     Essie Treviño MD   [employer] [facility] PGY4

## 2022-07-15 LAB
ESTROGEN SERPL-MCNC: NORMAL PG/ML
INSULIN SERPL-ACNC: NORMAL U[IU]/ML
LAB AP CLINICAL INFORMATION: NORMAL
LAB AP GROSS DESCRIPTION: NORMAL
LAB AP REPORT FOOTNOTES: NORMAL
T3RU NFR SERPL: NORMAL %

## 2022-07-15 RX ORDER — FERROUS SULFATE 325(65) MG
325 TABLET, DELAYED RELEASE (ENTERIC COATED) ORAL DAILY
Qty: 30 TABLET | Refills: 6 | Status: SHIPPED | OUTPATIENT
Start: 2022-07-15 | End: 2023-06-06 | Stop reason: SDUPTHER

## 2022-07-20 ENCOUNTER — TELEPHONE (OUTPATIENT)
Dept: GYNECOLOGY | Facility: CLINIC | Age: 47
End: 2022-07-20
Payer: MEDICAID

## 2022-07-20 NOTE — TELEPHONE ENCOUNTER
Called patient (left voicemail) wants EMB results, they are not ready yet.  Let her know that Dr will call when results come in.

## 2022-07-22 LAB
INSULIN SERPL-ACNC: NORMAL U[IU]/ML
LAB AP BETHESDA CATEGORY: NORMAL
LAB AP GYN MOLECULAR TESTING: NORMAL
LAB AP LMP DATE: NORMAL
LAB AP OCHS PAP SPECIMEN ADEQUACY: NORMAL
LAB AP OHS PAP INTERPRETATION: NORMAL
LAB AP PAP DISCLAIMER COMMENTS: NORMAL

## 2022-07-26 LAB
CHLAMYDIA TRACHOMATIS (PRECISION): NEGATIVE
HIGH RISK HPV 16 (PRECISION): NEGATIVE
HIGH RISK HPV 18/45 (PRECISION): NEGATIVE
HPV16+18+H RISK 12 DNA CVX-IMP: NEGATIVE
NEISSERIA GONORRHOEAE (PRECISION): NEGATIVE

## 2022-07-27 ENCOUNTER — TELEPHONE (OUTPATIENT)
Dept: GYNECOLOGY | Facility: CLINIC | Age: 47
End: 2022-07-27
Payer: MEDICAID

## 2022-07-27 NOTE — TELEPHONE ENCOUNTER
Called patient to review her EMB results.     Endometrial biopsy:  - Scant fragments of benign endometrial tissue with predominantly blood.  - Insufficient tissue for further diagnostic evaluation.    Discussed at this time, will set her up for IUD placement next week.  Cari Burnett MD PGY2  Obstetrics & Gynecology   07/27/2022

## 2022-08-15 ENCOUNTER — TELEPHONE (OUTPATIENT)
Dept: GYNECOLOGY | Facility: CLINIC | Age: 47
End: 2022-08-15
Payer: MEDICAID

## 2022-08-16 ENCOUNTER — TELEPHONE (OUTPATIENT)
Dept: GYNECOLOGY | Facility: CLINIC | Age: 47
End: 2022-08-16
Payer: MEDICAID

## 2022-08-16 NOTE — TELEPHONE ENCOUNTER
Spoke with patient, patient is coming in for IUD 8/19/22, patient decided she and partner will like to have a baby and will like to discuss more options to help have a baby. Please Advise

## 2022-08-18 ENCOUNTER — TELEPHONE (OUTPATIENT)
Dept: GYNECOLOGY | Facility: CLINIC | Age: 47
End: 2022-08-18
Payer: MEDICAID

## 2022-08-18 NOTE — TELEPHONE ENCOUNTER
Patient had left voicemail about appt.  Does not want IUD at this time and wants to try to have another baby.  Spoke to Dr Quesada and will convert clinic appt to telemed at 7:30am.  Patient called and verbalizes understanding

## 2022-09-16 ENCOUNTER — HOSPITAL ENCOUNTER (EMERGENCY)
Facility: HOSPITAL | Age: 47
Discharge: HOME | End: 2022-09-17
Attending: STUDENT IN AN ORGANIZED HEALTH CARE EDUCATION/TRAINING PROGRAM
Payer: MEDICAID

## 2022-09-16 DIAGNOSIS — N39.0 LOWER URINARY TRACT INFECTIOUS DISEASE: Primary | ICD-10-CM

## 2022-09-16 DIAGNOSIS — N94.6 MENSTRUAL CRAMPS: ICD-10-CM

## 2022-09-16 LAB
ALBUMIN SERPL-MCNC: 3.8 GM/DL (ref 3.5–5)
ALBUMIN/GLOB SERPL: 1 RATIO (ref 1.1–2)
ALP SERPL-CCNC: 72 UNIT/L (ref 40–150)
ALT SERPL-CCNC: 23 UNIT/L (ref 0–55)
APPEARANCE UR: CLEAR
AST SERPL-CCNC: 24 UNIT/L (ref 5–34)
B-HCG SERPL QL: NEGATIVE
BACTERIA #/AREA URNS AUTO: ABNORMAL /HPF
BASOPHILS # BLD AUTO: 0.11 X10(3)/MCL (ref 0–0.2)
BASOPHILS NFR BLD AUTO: 1.1 %
BILIRUB UR QL STRIP.AUTO: NEGATIVE MG/DL
BILIRUBIN DIRECT+TOT PNL SERPL-MCNC: 0.3 MG/DL
BUN SERPL-MCNC: 11.2 MG/DL (ref 7–18.7)
CALCIUM SERPL-MCNC: 8.9 MG/DL (ref 8.4–10.2)
CHLORIDE SERPL-SCNC: 105 MMOL/L (ref 98–107)
CO2 SERPL-SCNC: 25 MMOL/L (ref 22–29)
COLOR UR AUTO: YELLOW
CREAT SERPL-MCNC: 0.6 MG/DL (ref 0.55–1.02)
EOSINOPHIL # BLD AUTO: 0.2 X10(3)/MCL (ref 0–0.9)
EOSINOPHIL NFR BLD AUTO: 2 %
ERYTHROCYTE [DISTWIDTH] IN BLOOD BY AUTOMATED COUNT: 15.9 % (ref 11.5–17)
GFR SERPLBLD CREATININE-BSD FMLA CKD-EPI: >60 MLS/MIN/1.73/M2
GLOBULIN SER-MCNC: 3.8 GM/DL (ref 2.4–3.5)
GLUCOSE SERPL-MCNC: 82 MG/DL (ref 74–100)
GLUCOSE UR QL STRIP.AUTO: NEGATIVE MG/DL
HCT VFR BLD AUTO: 37.2 % (ref 37–47)
HGB BLD-MCNC: 10.1 GM/DL (ref 12–16)
IMM GRANULOCYTES # BLD AUTO: 0.04 X10(3)/MCL (ref 0–0.04)
IMM GRANULOCYTES NFR BLD AUTO: 0.4 %
KETONES UR QL STRIP.AUTO: ABNORMAL MG/DL
LEUKOCYTE ESTERASE UR QL STRIP.AUTO: NEGATIVE UNIT/L
LYMPHOCYTES # BLD AUTO: 2.63 X10(3)/MCL (ref 0.6–4.6)
LYMPHOCYTES NFR BLD AUTO: 26.4 %
MCH RBC QN AUTO: 21.6 PG (ref 27–31)
MCHC RBC AUTO-ENTMCNC: 27.2 MG/DL (ref 33–36)
MCV RBC AUTO: 79.5 FL (ref 80–94)
MONOCYTES # BLD AUTO: 1.09 X10(3)/MCL (ref 0.1–1.3)
MONOCYTES NFR BLD AUTO: 11 %
NEUTROPHILS # BLD AUTO: 5.9 X10(3)/MCL (ref 2.1–9.2)
NEUTROPHILS NFR BLD AUTO: 59.1 %
NITRITE UR QL STRIP.AUTO: NEGATIVE
NRBC BLD AUTO-RTO: 0 %
PH UR STRIP.AUTO: 7.5 [PH]
PLATELET # BLD AUTO: 464 X10(3)/MCL (ref 130–400)
PMV BLD AUTO: 10.2 FL (ref 7.4–10.4)
POTASSIUM SERPL-SCNC: 4.1 MMOL/L (ref 3.5–5.1)
PROT SERPL-MCNC: 7.6 GM/DL (ref 6.4–8.3)
PROT UR QL STRIP.AUTO: NEGATIVE MG/DL
RBC # BLD AUTO: 4.68 X10(6)/MCL (ref 4.2–5.4)
RBC #/AREA URNS AUTO: <5 /HPF
RBC UR QL AUTO: NEGATIVE UNIT/L
SODIUM SERPL-SCNC: 137 MMOL/L (ref 136–145)
SP GR UR STRIP.AUTO: 1.02 (ref 1–1.03)
SQUAMOUS #/AREA URNS AUTO: 6 /HPF
UROBILINOGEN UR STRIP-ACNC: 1 MG/DL
WBC # SPEC AUTO: 10 X10(3)/MCL (ref 4.5–11.5)
WBC #/AREA URNS AUTO: <5 /HPF

## 2022-09-16 PROCEDURE — 85025 COMPLETE CBC W/AUTO DIFF WBC: CPT | Performed by: NURSE PRACTITIONER

## 2022-09-16 PROCEDURE — 80053 COMPREHEN METABOLIC PANEL: CPT | Performed by: NURSE PRACTITIONER

## 2022-09-16 PROCEDURE — 81003 URINALYSIS AUTO W/O SCOPE: CPT | Performed by: NURSE PRACTITIONER

## 2022-09-16 PROCEDURE — 99283 EMERGENCY DEPT VISIT LOW MDM: CPT | Mod: 25

## 2022-09-16 PROCEDURE — 81025 URINE PREGNANCY TEST: CPT | Performed by: NURSE PRACTITIONER

## 2022-09-16 PROCEDURE — 81001 URINALYSIS AUTO W/SCOPE: CPT | Performed by: NURSE PRACTITIONER

## 2022-09-16 PROCEDURE — 36415 COLL VENOUS BLD VENIPUNCTURE: CPT | Performed by: NURSE PRACTITIONER

## 2022-09-17 VITALS
TEMPERATURE: 98 F | DIASTOLIC BLOOD PRESSURE: 73 MMHG | HEART RATE: 66 BPM | BODY MASS INDEX: 29.21 KG/M2 | RESPIRATION RATE: 20 BRPM | WEIGHT: 158.75 LBS | OXYGEN SATURATION: 100 % | HEIGHT: 62 IN | SYSTOLIC BLOOD PRESSURE: 118 MMHG

## 2022-09-17 RX ORDER — CEPHALEXIN 500 MG/1
500 CAPSULE ORAL EVERY 12 HOURS
Qty: 14 CAPSULE | Refills: 0 | Status: SHIPPED | OUTPATIENT
Start: 2022-09-17 | End: 2022-09-24

## 2022-09-17 NOTE — ED PROVIDER NOTES
Encounter Date: 2022       History     Chief Complaint   Patient presents with    Abdominal Pain    Breast Pain     Complaint of lower abdominal cramping, with bilateral breast soreness. Symptoms started 3 weeks ago.  Did finish progesterone 1 month ago.      47 year old patient came in with c/o of abdominal pelvic cramping, bilateral breast tenderness. As per patient she started progesterone 3 weeks ago to help with irregular menstrual periods. Her last period was a few months ago. DENIES CHEST PAIN, DENIES SOB.      Abdominal Cramping  The primary symptoms of the illness include abdominal pain. The primary symptoms of the illness do not include fever, shortness of breath, nausea, diarrhea or dysuria.   The abdominal pain does not radiate.   The patient states that she believes she is currently not pregnant. Symptoms associated with the illness do not include chills, constipation, hematuria or back pain.   Review of patient's allergies indicates:  No Known Allergies  Past Medical History:   Diagnosis Date    Abnormal uterine bleeding (AUB)     Anemia     Hypertension     Stroke      Past Surgical History:   Procedure Laterality Date     SECTION       No family history on file.  Social History     Tobacco Use    Smoking status: Never    Smokeless tobacco: Never   Substance Use Topics    Alcohol use: Not Currently    Drug use: Never     Review of Systems   Constitutional:  Negative for chills and fever.   HENT:  Negative for sore throat.    Respiratory:  Negative for shortness of breath.    Cardiovascular:  Negative for chest pain.   Gastrointestinal:  Positive for abdominal pain. Negative for constipation, diarrhea and nausea.   Genitourinary:  Negative for dysuria and hematuria.   Musculoskeletal:  Negative for back pain.   Skin:  Negative for rash.   Neurological:  Negative for weakness.   Hematological:  Does not bruise/bleed easily.     Physical Exam     Initial Vitals [22]   BP Pulse  Resp Temp SpO2   136/88 77 20 97.5 °F (36.4 °C) 99 %      MAP       --         Physical Exam    Vitals reviewed.  Constitutional: She appears well-developed.   Cardiovascular:  Normal rate.           Pulmonary/Chest: Breath sounds normal.   Abdominal: There is abdominal tenderness in the suprapubic area.   No right CVA tenderness.  No left CVA tenderness. There is no rebound, no guarding, no tenderness at McBurney's point and negative Cohen's sign. negative psoas sign and negative Rovsing's sign    Neurological: She is alert and oriented to person, place, and time. She has normal strength.   Skin: Skin is warm.   Psychiatric: Her behavior is normal. Thought content normal.       ED Course   Procedures  Labs Reviewed   CBC WITH DIFFERENTIAL - Abnormal; Notable for the following components:       Result Value    Hgb 10.1 (*)     MCV 79.5 (*)     MCH 21.6 (*)     MCHC 27.2 (*)     Platelet 464 (*)     All other components within normal limits   COMPREHENSIVE METABOLIC PANEL - Abnormal; Notable for the following components:    Globulin 3.8 (*)     Albumin/Globulin Ratio 1.0 (*)     All other components within normal limits   URINALYSIS, REFLEX TO URINE CULTURE - Abnormal; Notable for the following components:    Ketones, UA Trace (*)     All other components within normal limits   URINALYSIS, MICROSCOPIC - Abnormal; Notable for the following components:    Squamous Epithelial Cells, UA 6 (*)     Bacteria, UA 1+ (*)     All other components within normal limits   PREGNANCY TEST, URINE RAPID - Normal          Imaging Results    None          Medications - No data to display  Medical Decision Making:   Differential Diagnosis:   Menstrual cramping  Lower UTI  Clinical Tests:   Lab Tests: Reviewed  Radiological Study: Ordered             ED Course as of 09/17/22 0100   Sat Sep 17, 2022   0032 RBC, UA: <5 [YG]   0032 WBC, UA: <5 [YG]   0032 Squam Epithel, UA(!): 6 [YG]   0032 Bacteria, UA(!): 1+ [YG]   0033 Sodium: 137 [YG]    0033 Potassium: 4.1 [YG]   0033 CO2: 25 [YG]   0033 Creatinine: 0.60 [YG]   0033 Globulin, Total(!): 3.8 [YG]   0033 Albumin/Globulin Ratio(!): 1.0 [YG]   0033 BILIRUBIN TOTAL: 0.3 [YG]   0033 ALT: 23 [YG]   0033 AST: 24 [YG]   0033 Color, UA: Yellow [YG]   0033 Protein, UA: Negative [YG]   0033 Glucose, UA: Negative [YG]   0033 Ketones, UA(!): Trace [YG]   0033 NITRITE UA: Negative [YG]   0033 Leukocytes, UA: Negative [YG]   0033 WBC: 10.0 [YG]   0033 Hemoglobin(!): 10.1 [YG]   0033 MCV(!): 79.5 [YG]   0033 MCH(!): 21.6 [YG]   0033 MCHC(!): 27.2 [YG]   0033 Platelets(!): 464 [YG]      ED Course User Index  [YG] JESSICA Samano                 Clinical Impression:   Final diagnoses:  [N39.0] Lower urinary tract infectious disease (Primary)  [N94.6] Menstrual cramps      ED Disposition Condition    Discharge Stable          ED Prescriptions       Medication Sig Dispense Start Date End Date Auth. Provider    cephALEXin (KEFLEX) 500 MG capsule Take 1 capsule (500 mg total) by mouth every 12 (twelve) hours. for 7 days 14 capsule 9/17/2022 9/24/2022 JESSICA Samano          Follow-up Information       Follow up With Specialties Details Why Contact Info      In 1 week      [facility] - Emergency Dept Emergency Medicine  If symptoms worsen [address], [place] [id number] [phone]             JESSICA Samano  09/17/22 0107

## 2022-09-17 NOTE — FIRST PROVIDER EVALUATION
Medical screening examination initiated.  I have conducted a focused provider triage encounter, findings are as follows:    Brief history of present illness:  48 y/o female who presents with low abdominal cramping and breast soreness for the past 3 weeks after getting off progesterone. She was on progesterone for abnormal uterine bleeding. Lmp end of july    There were no vitals filed for this visit.    Pertinent physical exam:  alert, nonlabored, ambulatory, cooperative    Brief workup plan:  labs,urine    Preliminary workup initiated; this workup will be continued and followed by the physician or advanced practice provider that is assigned to the patient when roomed.

## 2022-09-22 ENCOUNTER — HISTORICAL (OUTPATIENT)
Dept: ADMINISTRATIVE | Facility: HOSPITAL | Age: 47
End: 2022-09-22
Payer: MEDICAID

## 2022-11-08 ENCOUNTER — HOSPITAL ENCOUNTER (EMERGENCY)
Facility: HOSPITAL | Age: 47
Discharge: HOME | End: 2022-11-09
Attending: STUDENT IN AN ORGANIZED HEALTH CARE EDUCATION/TRAINING PROGRAM
Payer: MEDICAID

## 2022-11-08 VITALS
SYSTOLIC BLOOD PRESSURE: 116 MMHG | WEIGHT: 160 LBS | TEMPERATURE: 97 F | OXYGEN SATURATION: 99 % | DIASTOLIC BLOOD PRESSURE: 81 MMHG | RESPIRATION RATE: 20 BRPM | HEART RATE: 72 BPM

## 2022-11-08 DIAGNOSIS — N93.9 ABNORMAL UTERINE AND VAGINAL BLEEDING, UNSPECIFIED: ICD-10-CM

## 2022-11-08 DIAGNOSIS — R11.2 NAUSEA AND VOMITING, UNSPECIFIED VOMITING TYPE: ICD-10-CM

## 2022-11-08 DIAGNOSIS — R42 DIZZINESS: Primary | ICD-10-CM

## 2022-11-08 DIAGNOSIS — N93.9 VAGINA BLEEDING: ICD-10-CM

## 2022-11-08 LAB
ALBUMIN SERPL-MCNC: 3.9 GM/DL (ref 3.5–5)
ALBUMIN/GLOB SERPL: 1.1 RATIO (ref 1.1–2)
ALP SERPL-CCNC: 71 UNIT/L (ref 40–150)
ALT SERPL-CCNC: 20 UNIT/L (ref 0–55)
APPEARANCE UR: CLEAR
AST SERPL-CCNC: 27 UNIT/L (ref 5–34)
B-HCG SERPL QL: NEGATIVE
BACTERIA #/AREA URNS AUTO: ABNORMAL /HPF
BASOPHILS # BLD AUTO: 0.04 X10(3)/MCL (ref 0–0.2)
BASOPHILS NFR BLD AUTO: 0.6 %
BILIRUB UR QL STRIP.AUTO: NEGATIVE MG/DL
BILIRUBIN DIRECT+TOT PNL SERPL-MCNC: 0.4 MG/DL
BUN SERPL-MCNC: 12.7 MG/DL (ref 7–18.7)
CALCIUM SERPL-MCNC: 9.6 MG/DL (ref 8.4–10.2)
CHLORIDE SERPL-SCNC: 106 MMOL/L (ref 98–107)
CO2 SERPL-SCNC: 24 MMOL/L (ref 22–29)
COLOR UR AUTO: YELLOW
CREAT SERPL-MCNC: 0.67 MG/DL (ref 0.55–1.02)
EOSINOPHIL # BLD AUTO: 0.01 X10(3)/MCL (ref 0–0.9)
EOSINOPHIL NFR BLD AUTO: 0.1 %
ERYTHROCYTE [DISTWIDTH] IN BLOOD BY AUTOMATED COUNT: 18.5 % (ref 11.5–17)
GFR SERPLBLD CREATININE-BSD FMLA CKD-EPI: >60 MLS/MIN/1.73/M2
GLOBULIN SER-MCNC: 3.6 GM/DL (ref 2.4–3.5)
GLUCOSE SERPL-MCNC: 89 MG/DL (ref 74–100)
GLUCOSE UR QL STRIP.AUTO: NEGATIVE MG/DL
GROUP & RH: NORMAL
HCT VFR BLD AUTO: 32.1 % (ref 37–47)
HGB BLD-MCNC: 9.2 GM/DL (ref 12–16)
IMM GRANULOCYTES # BLD AUTO: 0.03 X10(3)/MCL (ref 0–0.04)
IMM GRANULOCYTES NFR BLD AUTO: 0.4 %
INDIRECT COOMBS GEL: NORMAL
KETONES UR QL STRIP.AUTO: ABNORMAL MG/DL
LEUKOCYTE ESTERASE UR QL STRIP.AUTO: NEGATIVE UNIT/L
LYMPHOCYTES # BLD AUTO: 1.34 X10(3)/MCL (ref 0.6–4.6)
LYMPHOCYTES NFR BLD AUTO: 18.8 %
MCH RBC QN AUTO: 21.4 PG (ref 27–31)
MCHC RBC AUTO-ENTMCNC: 28.7 MG/DL (ref 33–36)
MCV RBC AUTO: 74.8 FL (ref 80–94)
MONOCYTES # BLD AUTO: 0.3 X10(3)/MCL (ref 0.1–1.3)
MONOCYTES NFR BLD AUTO: 4.2 %
NEUTROPHILS # BLD AUTO: 5.4 X10(3)/MCL (ref 2.1–9.2)
NEUTROPHILS NFR BLD AUTO: 75.9 %
NITRITE UR QL STRIP.AUTO: NEGATIVE
NRBC BLD AUTO-RTO: 0 %
PH UR STRIP.AUTO: 6.5 [PH]
PLATELET # BLD AUTO: 340 X10(3)/MCL (ref 130–400)
PMV BLD AUTO: 11.3 FL (ref 7.4–10.4)
POTASSIUM SERPL-SCNC: 4 MMOL/L (ref 3.5–5.1)
PROT SERPL-MCNC: 7.5 GM/DL (ref 6.4–8.3)
PROT UR QL STRIP.AUTO: ABNORMAL MG/DL
RBC # BLD AUTO: 4.29 X10(6)/MCL (ref 4.2–5.4)
RBC #/AREA URNS AUTO: 206 /HPF
RBC UR QL AUTO: ABNORMAL UNIT/L
SODIUM SERPL-SCNC: 139 MMOL/L (ref 136–145)
SP GR UR STRIP.AUTO: 1.01 (ref 1–1.03)
SQUAMOUS #/AREA URNS AUTO: <5 /HPF
TROPONIN I SERPL-MCNC: <0.01 NG/ML (ref 0–0.04)
UROBILINOGEN UR STRIP-ACNC: 0.2 MG/DL
WBC # SPEC AUTO: 7.1 X10(3)/MCL (ref 4.5–11.5)
WBC #/AREA URNS AUTO: <5 /HPF

## 2022-11-08 PROCEDURE — 80053 COMPREHEN METABOLIC PANEL: CPT | Performed by: PHYSICIAN ASSISTANT

## 2022-11-08 PROCEDURE — 84484 ASSAY OF TROPONIN QUANT: CPT | Performed by: PHYSICIAN ASSISTANT

## 2022-11-08 PROCEDURE — 81025 URINE PREGNANCY TEST: CPT | Performed by: PHYSICIAN ASSISTANT

## 2022-11-08 PROCEDURE — 86850 RBC ANTIBODY SCREEN: CPT | Performed by: PHYSICIAN ASSISTANT

## 2022-11-08 PROCEDURE — 93010 ELECTROCARDIOGRAM REPORT: CPT | Mod: ,,, | Performed by: INTERNAL MEDICINE

## 2022-11-08 PROCEDURE — 96374 THER/PROPH/DIAG INJ IV PUSH: CPT

## 2022-11-08 PROCEDURE — 25000003 PHARM REV CODE 250: Performed by: PHYSICIAN ASSISTANT

## 2022-11-08 PROCEDURE — 85025 COMPLETE CBC W/AUTO DIFF WBC: CPT | Performed by: PHYSICIAN ASSISTANT

## 2022-11-08 PROCEDURE — 96361 HYDRATE IV INFUSION ADD-ON: CPT

## 2022-11-08 PROCEDURE — 93005 ELECTROCARDIOGRAM TRACING: CPT

## 2022-11-08 PROCEDURE — 63600175 PHARM REV CODE 636 W HCPCS: Performed by: PHYSICIAN ASSISTANT

## 2022-11-08 PROCEDURE — 81003 URINALYSIS AUTO W/O SCOPE: CPT | Performed by: PHYSICIAN ASSISTANT

## 2022-11-08 PROCEDURE — 99284 EMERGENCY DEPT VISIT MOD MDM: CPT | Mod: 25

## 2022-11-08 PROCEDURE — 81001 URINALYSIS AUTO W/SCOPE: CPT | Performed by: PHYSICIAN ASSISTANT

## 2022-11-08 PROCEDURE — 93010 EKG 12-LEAD: ICD-10-PCS | Mod: ,,, | Performed by: INTERNAL MEDICINE

## 2022-11-08 RX ORDER — ONDANSETRON 2 MG/ML
4 INJECTION INTRAMUSCULAR; INTRAVENOUS
Status: COMPLETED | OUTPATIENT
Start: 2022-11-08 | End: 2022-11-08

## 2022-11-08 RX ADMIN — SODIUM CHLORIDE 1000 ML: 9 INJECTION, SOLUTION INTRAVENOUS at 05:11

## 2022-11-08 RX ADMIN — ONDANSETRON 4 MG: 2 INJECTION INTRAMUSCULAR; INTRAVENOUS at 05:11

## 2022-11-08 NOTE — FIRST PROVIDER EVALUATION
Medical screening examination initiated.  I have conducted a focused provider triage encounter, findings are as follows:    Brief history of present illness:  47-year-old female presents to ED for evaluation nausea, vomiting, and dizziness worsening over the last several days.  Patient states abdominal cramping.  Reports has been having a menstrual cycle for the last 13 days.  States history of anemia.    Vitals:    11/08/22 1721   BP: 116/81   Pulse: 72   Resp: 20   Temp: 97.2 °F (36.2 °C)   TempSrc: Oral   SpO2: 99%   Weight: 72.6 kg (160 lb)       Pertinent physical exam:  Awake alert oriented sitting in chair.    Brief workup plan:  Labs, Type and screen, EKG, IVF, zofran    Preliminary workup initiated; this workup will be continued and followed by the physician or advanced practice provider that is assigned to the patient when roomed.

## 2022-11-09 RX ORDER — FERROUS SULFATE 325(65) MG
325 TABLET, DELAYED RELEASE (ENTERIC COATED) ORAL DAILY
Qty: 30 TABLET | Refills: 0 | OUTPATIENT
Start: 2022-11-09 | End: 2022-11-09

## 2022-11-09 RX ORDER — ONDANSETRON 4 MG/1
4 TABLET, ORALLY DISINTEGRATING ORAL EVERY 6 HOURS PRN
Qty: 12 TABLET | Refills: 0 | OUTPATIENT
Start: 2022-11-09 | End: 2022-11-09

## 2022-11-09 RX ORDER — ONDANSETRON 4 MG/1
4 TABLET, ORALLY DISINTEGRATING ORAL EVERY 6 HOURS PRN
Qty: 12 TABLET | Refills: 0 | Status: SHIPPED | OUTPATIENT
Start: 2022-11-09 | End: 2022-11-09 | Stop reason: CLARIF

## 2022-11-09 RX ORDER — ONDANSETRON 4 MG/1
4 TABLET, ORALLY DISINTEGRATING ORAL EVERY 6 HOURS PRN
Qty: 12 TABLET | Refills: 0 | Status: SHIPPED | OUTPATIENT
Start: 2022-11-09 | End: 2023-06-06

## 2022-11-09 RX ORDER — FERROUS SULFATE 325(65) MG
325 TABLET, DELAYED RELEASE (ENTERIC COATED) ORAL DAILY
Qty: 30 TABLET | Refills: 0 | Status: SHIPPED | OUTPATIENT
Start: 2022-11-09 | End: 2022-12-09

## 2022-11-09 RX ORDER — FERROUS SULFATE 325(65) MG
325 TABLET, DELAYED RELEASE (ENTERIC COATED) ORAL DAILY
Qty: 30 TABLET | Refills: 0 | Status: SHIPPED | OUTPATIENT
Start: 2022-11-09 | End: 2022-11-09 | Stop reason: SDUPTHER

## 2022-11-09 RX ORDER — FERROUS SULFATE 325(65) MG
325 TABLET, DELAYED RELEASE (ENTERIC COATED) ORAL DAILY
Qty: 30 TABLET | Refills: 0 | Status: SHIPPED | OUTPATIENT
Start: 2022-11-09 | End: 2022-11-09 | Stop reason: CLARIF

## 2022-11-09 RX ORDER — ONDANSETRON 4 MG/1
4 TABLET, ORALLY DISINTEGRATING ORAL EVERY 6 HOURS PRN
Qty: 12 TABLET | Refills: 0 | Status: SHIPPED | OUTPATIENT
Start: 2022-11-09 | End: 2022-11-09 | Stop reason: SDUPTHER

## 2022-11-09 NOTE — ED PROVIDER NOTES
Encounter Date: 11/8/2022       History     Chief Complaint   Patient presents with    Weakness     C/o weakness with n/v for the last couple of days. Also c/o heavy vaginal bleeding - currently on cycle. Hx of anemia.      47 y.o.  female with a history of anemia, CVA, and gastric ulcers presents to Emergency Department with a chief complaint of vaginal bleeding. Symptoms began several days ago and have been constant since onset. Patient reports prior to start of menstrual period at the end of October, she had not had a cycle in 5 months. Associated symptoms include weakness, nausea, and vomiting. The patient denies CP, confusion, SOB, slurred speech, dizziness, diarrhea, fever, or chills. No other reported symptoms at this time.      The history is provided by the patient. No  was used.   Vaginal Bleeding  This is a new problem. The current episode started more than 2 days ago. The problem occurs constantly. The problem has not changed since onset.Pertinent negatives include no chest pain, no abdominal pain, no headaches and no shortness of breath. She has tried nothing for the symptoms.   Review of patient's allergies indicates:  Not on File  Past Medical History:   Diagnosis Date    Anemia, unspecified     CVA (cerebral vascular accident)     Gastric ulcer      History reviewed. No pertinent surgical history.  No family history on file.  Social History     Tobacco Use    Smoking status: Never    Smokeless tobacco: Never   Substance Use Topics    Alcohol use: Yes     Comment: occassionally     Review of Systems   Constitutional:  Negative for chills and fever.   Respiratory:  Negative for chest tightness, shortness of breath and wheezing.    Cardiovascular:  Negative for chest pain and leg swelling.   Gastrointestinal:  Positive for nausea and vomiting. Negative for abdominal distention and abdominal pain.   Genitourinary:  Positive for menstrual problem and vaginal bleeding. Negative for  difficulty urinating, dysuria and flank pain.   Musculoskeletal:  Negative for neck pain and neck stiffness.   Skin:  Negative for rash and wound.   Neurological:  Positive for weakness. Negative for dizziness, syncope and headaches.   All other systems reviewed and are negative.    Physical Exam     Initial Vitals [11/08/22 1721]   BP Pulse Resp Temp SpO2   116/81 72 20 97.2 °F (36.2 °C) 99 %      MAP       --         Physical Exam    Nursing note and vitals reviewed.  Constitutional: She appears well-developed and well-nourished. She is not diaphoretic. No distress.   HENT:   Head: Normocephalic and atraumatic.   Right Ear: External ear normal.   Left Ear: External ear normal.   Mouth/Throat: Oropharynx is clear and moist. No oropharyngeal exudate.   Eyes: Conjunctivae and EOM are normal. Pupils are equal, round, and reactive to light. Right eye exhibits no discharge. Left eye exhibits no discharge.   Neck: Neck supple. No JVD present.   Normal range of motion.  Cardiovascular:  Normal rate, regular rhythm, normal heart sounds and intact distal pulses.           Pulmonary/Chest: Breath sounds normal. No stridor. No respiratory distress. She has no wheezes. She exhibits no tenderness.   Abdominal: Abdomen is soft. Bowel sounds are normal. She exhibits no distension. There is no abdominal tenderness. There is no guarding.   Genitourinary:    Rectum normal.      Pelvic exam was performed with patient supine.   Cervix exhibits no motion tenderness and no discharge. Right adnexum displays no mass and no tenderness. Left adnexum displays no mass and no tenderness.    Vaginal bleeding present.      No vaginal discharge or tenderness.   There is bleeding in the vagina. No tenderness in the vagina.    Genitourinary Comments: Pelvic Exam performed with TRISTAN Brunson as chaperone. Moderate amount of bleeding noted during exam; no blood clots. Patient tolerated well.      Musculoskeletal:         General: No tenderness or  edema. Normal range of motion.      Cervical back: Normal range of motion and neck supple.     Neurological: She is alert and oriented to person, place, and time. She has normal strength. No sensory deficit. GCS score is 15. GCS eye subscore is 4. GCS verbal subscore is 5. GCS motor subscore is 6.   Skin: Skin is warm and dry. Capillary refill takes less than 2 seconds. No rash noted. No erythema.   Psychiatric: She has a normal mood and affect. Thought content normal.       ED Course   Procedures  Labs Reviewed   COMPREHENSIVE METABOLIC PANEL - Abnormal; Notable for the following components:       Result Value    Globulin 3.6 (*)     All other components within normal limits   URINALYSIS, REFLEX TO URINE CULTURE - Abnormal; Notable for the following components:    Protein, UA Trace (*)     Ketones, UA 1+ (*)     Blood, UA 3+ (*)     All other components within normal limits   CBC WITH DIFFERENTIAL - Abnormal; Notable for the following components:    Hgb 9.2 (*)     Hct 32.1 (*)     MCV 74.8 (*)     MCH 21.4 (*)     MCHC 28.7 (*)     RDW 18.5 (*)     MPV 11.3 (*)     All other components within normal limits   URINALYSIS, MICROSCOPIC - Abnormal; Notable for the following components:    RBC,  (*)     All other components within normal limits   TROPONIN I - Normal   PREGNANCY TEST, URINE RAPID - Normal   CBC W/ AUTO DIFFERENTIAL    Narrative:     The following orders were created for panel order CBC auto differential.  Procedure                               Abnormality         Status                     ---------                               -----------         ------                     CBC with Differential[[id number]]        Abnormal            Final result                 Please view results for these tests on the individual orders.   TYPE & SCREEN     EKG Readings: (Independently Interpreted)   Rhythm: Normal Sinus Rhythm. Heart Rate: 74. Ectopy: No Ectopy. Conduction: Normal. ST Segments: Normal ST  Segments. T Waves: Normal. Clinical Impression: Normal Sinus Rhythm Other Impression: Normal Sinus Rhythm with Sinus Arrythmia     Imaging Results    None          Medications   sodium chloride 0.9% bolus 1,000 mL (0 mLs Intravenous Stopped 11/8/22 1839)   ondansetron injection 4 mg (4 mg Intravenous Given 11/8/22 1739)     Medical Decision Making:   Differential Diagnosis:   Abnormal Uterine Bleeding, Vaginal Bleeding, Anemia, Nausea, Vomiting   Clinical Tests:   Lab Tests: Ordered and Reviewed  The following lab test(s) were unremarkable: CMP       <> Summary of Lab: H/H- 9.2/ 32.1  UA- RBCs 206  Medical Tests: Ordered and Reviewed  ED Management:  Due to patient's complaints, ordered labs and EKG. Unremarkable. Educated patient on results. Patient did not exhibit any acute neuro changes and reports that bleeding made her feel weak, therefore, did not order CT. Patient denies new or additional complaints; no further tests indicated at this time. Verbalized understanding of instructions. No emergent or apparent distress noted prior to discharge. To follow up with PCP and Gyn in 1 week as needed. Referral sent for OBGYN. Prescribed iron to assist with anemia and Zofran for n/v. Patient educated on strict ER return precautions such as CP, SOB, and syncope.    Other:   I discussed test(s) with the performing physician.       <> Summary of the Findings: Dr. Peña aware of patient's presentation, results, and complaints. Agreed with plan of care to refer to OBGYN and place patient on iron supplements.                         Clinical Impression:   Final diagnoses:  [R42] Dizziness (Primary)  [R11.2] Nausea and vomiting, unspecified vomiting type  [N93.9] Abnormal uterine and vaginal bleeding, unspecified  [N93.9] Vagina bleeding        ED Disposition Condition    Discharge Stable          ED Prescriptions       Medication Sig Dispense Start Date End Date Auth. Provider    ferrous sulfate 325 (65 FE) MG EC tablet   (Status: Discontinued) Take 1 tablet (325 mg total) by mouth once daily. 30 tablet 11/9/2022 11/9/2022 Radha Calero NP    ondansetron (ZOFRAN-ODT) 4 MG TbDL  (Status: Discontinued) Take 1 tablet (4 mg total) by mouth every 6 (six) hours as needed (Take as needed every 6 hours for nausea and/or vomiting.). 12 tablet 11/9/2022 11/9/2022 Radha Calero NP    ferrous sulfate 325 (65 FE) MG EC tablet  (Status: Discontinued) Take 1 tablet (325 mg total) by mouth once daily. 30 tablet 11/9/2022 11/9/2022 aRdha Calero NP    ondansetron (ZOFRAN-ODT) 4 MG TbDL  (Status: Discontinued) Take 1 tablet (4 mg total) by mouth every 6 (six) hours as needed (Take as needed every 6 hours for nausea and/or vomiting.). 12 tablet 11/9/2022 11/9/2022 Radha Calero NP    ferrous sulfate 325 (65 FE) MG EC tablet  (Status: Discontinued) Take 1 tablet (325 mg total) by mouth once daily. 30 tablet 11/9/2022 11/9/2022 Radha Calero NP    ondansetron (ZOFRAN-ODT) 4 MG TbDL  (Status: Discontinued) Take 1 tablet (4 mg total) by mouth every 6 (six) hours as needed (Take as needed every 6 hours for nausea and/or vomiting.). 12 tablet 11/9/2022 11/9/2022 Radha Calero NP    ferrous sulfate 325 (65 FE) MG EC tablet Take 1 tablet (325 mg total) by mouth once daily. 30 tablet 11/9/2022 12/9/2022 Radha Calero NP    ondansetron (ZOFRAN-ODT) 4 MG TbDL Take 1 tablet (4 mg total) by mouth every 6 (six) hours as needed (Take every 6 hours as needed for nausea and/or vomiting.). 12 tablet 11/9/2022 -- Radha Calero NP          Follow-up Information       Follow up With Specialties Details Why Contact Info    [facility] - Emergency Dept Emergency Medicine Go to  If symptoms worsen, As needed [address] [facility] [place] [address]  [phone]    PCP  Call in 1 week As needed, If symptoms worsen              Radha Calero NP  11/09/22 0055

## 2022-11-09 NOTE — DISCHARGE INSTRUCTIONS
Thanks for letting us take care of you today!  It is our goal to give you courteous care and to keep you comfortable and informed, if you have any questions before you leave I will be happy to try and answer them.    Here is some advice after your visit:      Your visit in the emergency department is NOT definitive care - please follow-up with your primary care doctor and/or specialist within 1 week.  Please return if you have any worsening in your condition or if you have any other concerns.    If you had radiology exams like an XRAY or CT in the emergency Department the interpreation on them may be preliminary - there may be less time sensitive findings on the reports please obtain these reports within 24 hours from the hospital or by using your out on your mobile phone to access records.  Bring these to your primary care doctor and/or specialist for further review of incidental findings.    Please review any LAB WORK from your visit today with your primary care physician.

## 2022-11-28 ENCOUNTER — HOSPITAL ENCOUNTER (EMERGENCY)
Facility: HOSPITAL | Age: 47
Discharge: HOME | End: 2022-11-28
Attending: EMERGENCY MEDICINE
Payer: MEDICAID

## 2022-11-28 VITALS
HEART RATE: 79 BPM | TEMPERATURE: 98 F | DIASTOLIC BLOOD PRESSURE: 79 MMHG | RESPIRATION RATE: 18 BRPM | SYSTOLIC BLOOD PRESSURE: 127 MMHG | OXYGEN SATURATION: 100 % | WEIGHT: 160 LBS

## 2022-11-28 DIAGNOSIS — D50.0 IRON DEFICIENCY ANEMIA DUE TO CHRONIC BLOOD LOSS: Primary | ICD-10-CM

## 2022-11-28 DIAGNOSIS — R53.83 FATIGUE, UNSPECIFIED TYPE: ICD-10-CM

## 2022-11-28 DIAGNOSIS — R53.1 WEAKNESS: ICD-10-CM

## 2022-11-28 DIAGNOSIS — N93.9 ABNORMAL UTERINE BLEEDING (AUB): ICD-10-CM

## 2022-11-28 LAB
ALBUMIN SERPL-MCNC: 3.7 GM/DL (ref 3.5–5)
ALBUMIN/GLOB SERPL: 1 RATIO (ref 1.1–2)
ALP SERPL-CCNC: 66 UNIT/L (ref 40–150)
ALT SERPL-CCNC: 14 UNIT/L (ref 0–55)
AST SERPL-CCNC: 18 UNIT/L (ref 5–34)
BASOPHILS # BLD AUTO: 0.08 X10(3)/MCL (ref 0–0.2)
BASOPHILS NFR BLD AUTO: 1 %
BILIRUBIN DIRECT+TOT PNL SERPL-MCNC: 0.4 MG/DL
BUN SERPL-MCNC: 13.1 MG/DL (ref 7–18.7)
CALCIUM SERPL-MCNC: 9 MG/DL (ref 8.4–10.2)
CHLORIDE SERPL-SCNC: 110 MMOL/L (ref 98–107)
CO2 SERPL-SCNC: 22 MMOL/L (ref 22–29)
CREAT SERPL-MCNC: 0.66 MG/DL (ref 0.55–1.02)
EOSINOPHIL # BLD AUTO: 0.17 X10(3)/MCL (ref 0–0.9)
EOSINOPHIL NFR BLD AUTO: 2.2 %
ERYTHROCYTE [DISTWIDTH] IN BLOOD BY AUTOMATED COUNT: 18.8 % (ref 11.5–17)
GFR SERPLBLD CREATININE-BSD FMLA CKD-EPI: >60 MLS/MIN/1.73/M2
GLOBULIN SER-MCNC: 3.6 GM/DL (ref 2.4–3.5)
GLUCOSE SERPL-MCNC: 81 MG/DL (ref 74–100)
HCT VFR BLD AUTO: 31.2 % (ref 37–47)
HGB BLD-MCNC: 8.7 GM/DL (ref 12–16)
IMM GRANULOCYTES # BLD AUTO: 0.02 X10(3)/MCL (ref 0–0.04)
IMM GRANULOCYTES NFR BLD AUTO: 0.3 %
IRON SATN MFR SERPL: 7 % (ref 20–50)
IRON SERPL-MCNC: 27 UG/DL (ref 50–170)
LYMPHOCYTES # BLD AUTO: 2.19 X10(3)/MCL (ref 0.6–4.6)
LYMPHOCYTES NFR BLD AUTO: 28.6 %
MCH RBC QN AUTO: 21 PG (ref 27–31)
MCHC RBC AUTO-ENTMCNC: 27.9 MG/DL (ref 33–36)
MCV RBC AUTO: 75.4 FL (ref 80–94)
MONOCYTES # BLD AUTO: 0.74 X10(3)/MCL (ref 0.1–1.3)
MONOCYTES NFR BLD AUTO: 9.7 %
NEUTROPHILS # BLD AUTO: 4.5 X10(3)/MCL (ref 2.1–9.2)
NEUTROPHILS NFR BLD AUTO: 58.2 %
NRBC BLD AUTO-RTO: 0 %
PLATELET # BLD AUTO: 341 X10(3)/MCL (ref 130–400)
PMV BLD AUTO: 10.5 FL (ref 7.4–10.4)
POTASSIUM SERPL-SCNC: 4 MMOL/L (ref 3.5–5.1)
PROT SERPL-MCNC: 7.3 GM/DL (ref 6.4–8.3)
RBC # BLD AUTO: 4.14 X10(6)/MCL (ref 4.2–5.4)
RET# (OHS): 0.16 (ref 0.02–0.08)
RETICULOCYTE COUNT AUTOMATED (OLG): 3.64 % (ref 1.1–2.1)
SODIUM SERPL-SCNC: 140 MMOL/L (ref 136–145)
TIBC SERPL-MCNC: 368 UG/DL (ref 70–310)
TIBC SERPL-MCNC: 395 UG/DL (ref 250–450)
TRANSFERRIN SERPL-MCNC: 343 MG/DL (ref 180–382)
VIT B12 SERPL-MCNC: 445 PG/ML (ref 213–816)
WBC # SPEC AUTO: 7.7 X10(3)/MCL (ref 4.5–11.5)

## 2022-11-28 PROCEDURE — 80053 COMPREHEN METABOLIC PANEL: CPT | Performed by: FAMILY MEDICINE

## 2022-11-28 PROCEDURE — 85025 COMPLETE CBC W/AUTO DIFF WBC: CPT | Performed by: FAMILY MEDICINE

## 2022-11-28 PROCEDURE — 85045 AUTOMATED RETICULOCYTE COUNT: CPT

## 2022-11-28 PROCEDURE — 99283 EMERGENCY DEPT VISIT LOW MDM: CPT | Mod: 25

## 2022-11-28 PROCEDURE — 82607 VITAMIN B-12: CPT

## 2022-11-28 PROCEDURE — 83540 ASSAY OF IRON: CPT

## 2022-11-28 RX ORDER — MEDROXYPROGESTERONE ACETATE 10 MG/1
10 TABLET ORAL DAILY
Qty: 10 TABLET | Refills: 0 | Status: SHIPPED | OUTPATIENT
Start: 2022-11-28 | End: 2022-12-06 | Stop reason: SDUPTHER

## 2022-11-28 NOTE — ED PROVIDER NOTES
Encounter Date: 11/28/2022       History     Chief Complaint   Patient presents with    Weakness     Gen weakness, pt reports she was sent her by Dr. Stoll for blood transfusion.      48 yo  female with hx of heavy menses, JAQUELINE, stomach ulcers, hiatal hernia, CVA presents c/o chronic generalized weakness and fatigue. Associated sx of light-headedness, and SOB with exertion walking around in the hospital today. States she has irregular menstrual cycle and has heavy menses, stopped bleeding 3 days ago. Denies chest pain, abd pain, N/V/D, blood in stool or urine, fever, cough, recent illness, headaches, dizziness, focal weakness/numbness.         Review of patient's allergies indicates:  Not on File  Past Medical History:   Diagnosis Date    Anemia, unspecified     CVA (cerebral vascular accident)     Gastric ulcer      No past surgical history on file.  No family history on file.  Social History     Tobacco Use    Smoking status: Never    Smokeless tobacco: Never   Substance Use Topics    Alcohol use: Yes     Comment: occassionally     Review of Systems   Constitutional:  Positive for fatigue. Negative for fever.   HENT:  Negative for congestion and sore throat.    Respiratory:  Positive for shortness of breath. Negative for cough and wheezing.    Cardiovascular:  Negative for chest pain, palpitations and leg swelling.   Gastrointestinal:  Negative for abdominal pain, diarrhea and nausea.   Genitourinary:  Positive for vaginal bleeding (heavy menses). Negative for decreased urine volume, difficulty urinating, dysuria, flank pain, frequency, hematuria and vaginal discharge.   Musculoskeletal:  Negative for back pain, neck pain and neck stiffness.   Skin:  Negative for rash.   Neurological:  Positive for light-headedness. Negative for dizziness, syncope, weakness, numbness and headaches.   Hematological:  Does not bruise/bleed easily.     Physical Exam     Initial Vitals [11/28/22 1543]   BP Pulse Resp Temp SpO2    127/79 79 18 98.4 °F (36.9 °C) 100 %      MAP       --         Physical Exam    Vitals reviewed.  Constitutional: She appears well-developed and well-nourished. She is not diaphoretic. No distress.   HENT:   Head: Normocephalic and atraumatic.   Mouth/Throat: Oropharynx is clear and moist. No oropharyngeal exudate.   Eyes: EOM are normal. Pupils are equal, round, and reactive to light.   Neck:   Normal range of motion.  Cardiovascular:  Normal rate, regular rhythm, normal heart sounds and intact distal pulses.           Pulmonary/Chest: Breath sounds normal. No respiratory distress. She has no wheezes. She has no rales.   Abdominal: Abdomen is soft. Bowel sounds are normal. She exhibits no distension. There is no abdominal tenderness. There is no rebound and no guarding.   Musculoskeletal:      Cervical back: Normal range of motion.     Neurological: She is alert and oriented to person, place, and time. She has normal strength.   Skin: Skin is warm and dry. Capillary refill takes less than 2 seconds.   Psychiatric: She has a normal mood and affect. Thought content normal.       ED Course   Procedures  Labs Reviewed   COMPREHENSIVE METABOLIC PANEL - Abnormal; Notable for the following components:       Result Value    Chloride 110 (*)     Globulin 3.6 (*)     Albumin/Globulin Ratio 1.0 (*)     All other components within normal limits   CBC WITH DIFFERENTIAL - Abnormal; Notable for the following components:    RBC 4.14 (*)     Hgb 8.7 (*)     Hct 31.2 (*)     MCV 75.4 (*)     MCH 21.0 (*)     MCHC 27.9 (*)     RDW 18.8 (*)     MPV 10.5 (*)     All other components within normal limits   RETICULOCYTES - Abnormal; Notable for the following components:    Retic Cnt Auto 3.64 (*)     RET# 0.158 (*)     All other components within normal limits   IRON AND TIBC - Abnormal; Notable for the following components:    Iron Binding Capacity Unsaturated 368 (*)     Iron Level 27 (*)     Iron Saturation 7 (*)     All other  components within normal limits   CBC W/ AUTO DIFFERENTIAL    Narrative:     The following orders were created for panel order CBC Auto Differential.  Procedure                               Abnormality         Status                     ---------                               -----------         ------                     CBC with Differential[[id number]]        Abnormal            Final result                 Please view results for these tests on the individual orders.   EXTRA TUBES    Narrative:     The following orders were created for panel order EXTRA TUBES.  Procedure                               Abnormality         Status                     ---------                               -----------         ------                     Light Blue Top Hold[[id number]]                              In process                 Red Top Hold[[id number]]                                     In process                 Gold Top Hold[[id number]]                                    In process                 Pink Top Hold[[id number]]                                    In process                   Please view results for these tests on the individual orders.   LIGHT BLUE TOP HOLD   RED TOP HOLD   GOLD TOP HOLD   PINK TOP HOLD   EXTRA TUBES    Narrative:     The following orders were created for panel order EXTRA TUBES.  Procedure                               Abnormality         Status                     ---------                               -----------         ------                     Lavender Top Hold[[id number]]                                In process                   Please view results for these tests on the individual orders.   LAVENDER TOP HOLD   VITAMIN B12          Imaging Results    None          Medications - No data to display                           Clinical Impression:   Final diagnoses:  [R53.1] Weakness  [R53.83] Fatigue, unspecified type  [D50.0] Iron deficiency anemia due to chronic blood loss  (Primary)  [N93.9] Abnormal uterine bleeding (AUB)     Hg is decreased but transfusion is not indicated at this time. Pt has iron supplements at home, advised to take with vit C. Sent home with short course of provera which she has gotten for similar sx previously. Pt referred to f/u outpatient with hem/onc to resume iron infusions, OBGYN referral also placed.      ED Disposition Condition    Discharge Stable          ED Prescriptions       Medication Sig Dispense Start Date End Date Auth. Provider    medroxyPROGESTERone (PROVERA) 10 MG tablet Take 1 tablet (10 mg total) by mouth once daily. for 10 days 10 tablet 11/28/2022 12/8/2022 Kulwant Stovall DO          Follow-up Information       Follow up With Specialties Details Why Contact Info    [facility] - Emergency Dept Emergency Medicine  If symptoms worsen [address] [facility] [place] [address] [phone]             Kulwant Stovall DO  Resident  11/28/22 2046

## 2022-12-05 DIAGNOSIS — D50.0 IRON DEFICIENCY ANEMIA SECONDARY TO BLOOD LOSS (CHRONIC): Primary | ICD-10-CM

## 2022-12-06 ENCOUNTER — OFFICE VISIT (OUTPATIENT)
Dept: HEMATOLOGY/ONCOLOGY | Facility: CLINIC | Age: 47
End: 2022-12-06
Payer: MEDICAID

## 2022-12-06 VITALS
RESPIRATION RATE: 20 BRPM | BODY MASS INDEX: 26.66 KG/M2 | WEIGHT: 160 LBS | SYSTOLIC BLOOD PRESSURE: 119 MMHG | HEART RATE: 68 BPM | TEMPERATURE: 98 F | OXYGEN SATURATION: 100 % | DIASTOLIC BLOOD PRESSURE: 80 MMHG | HEIGHT: 65 IN

## 2022-12-06 DIAGNOSIS — N92.4 EXCESSIVE BLEEDING IN PREMENOPAUSAL PERIOD: Primary | ICD-10-CM

## 2022-12-06 DIAGNOSIS — D50.0 IRON DEFICIENCY ANEMIA SECONDARY TO BLOOD LOSS (CHRONIC): ICD-10-CM

## 2022-12-06 DIAGNOSIS — Q15.9 EYE ABNORMALITIES: ICD-10-CM

## 2022-12-06 DIAGNOSIS — Z86.2 HISTORY OF IRON DEFICIENCY ANEMIA: ICD-10-CM

## 2022-12-06 DIAGNOSIS — Z12.31 BREAST CANCER SCREENING BY MAMMOGRAM: Primary | ICD-10-CM

## 2022-12-06 DIAGNOSIS — D47.2 MGUS (MONOCLONAL GAMMOPATHY OF UNKNOWN SIGNIFICANCE): ICD-10-CM

## 2022-12-06 LAB
ALBUMIN SERPL-MCNC: 3.9 GM/DL (ref 3.5–5)
ALBUMIN/GLOB SERPL: 1.1 RATIO (ref 1.1–2)
ALP SERPL-CCNC: 69 UNIT/L (ref 40–150)
ALT SERPL-CCNC: 15 UNIT/L (ref 0–55)
AST SERPL-CCNC: 19 UNIT/L (ref 5–34)
BASOPHILS # BLD AUTO: 0.05 X10(3)/MCL (ref 0–0.2)
BASOPHILS NFR BLD AUTO: 0.8 %
BILIRUBIN DIRECT+TOT PNL SERPL-MCNC: 0.5 MG/DL
BUN SERPL-MCNC: 9.5 MG/DL (ref 7–18.7)
CALCIUM SERPL-MCNC: 8.7 MG/DL (ref 8.4–10.2)
CHLORIDE SERPL-SCNC: 110 MMOL/L (ref 98–107)
CO2 SERPL-SCNC: 20 MMOL/L (ref 22–29)
CREAT SERPL-MCNC: 0.7 MG/DL (ref 0.55–1.02)
EOSINOPHIL # BLD AUTO: 0.12 X10(3)/MCL (ref 0–0.9)
EOSINOPHIL NFR BLD AUTO: 2 %
ERYTHROCYTE [DISTWIDTH] IN BLOOD BY AUTOMATED COUNT: 19.5 % (ref 11.5–17)
FERRITIN SERPL-MCNC: 7.89 NG/ML (ref 4.63–204)
GFR SERPLBLD CREATININE-BSD FMLA CKD-EPI: >60 MLS/MIN/1.73/M2
GLOBULIN SER-MCNC: 3.7 GM/DL (ref 2.4–3.5)
GLUCOSE SERPL-MCNC: 84 MG/DL (ref 74–100)
HCT VFR BLD AUTO: 32.4 % (ref 37–47)
HGB BLD-MCNC: 9 GM/DL (ref 12–16)
IMM GRANULOCYTES # BLD AUTO: 0.01 X10(3)/MCL (ref 0–0.04)
IMM GRANULOCYTES NFR BLD AUTO: 0.2 %
IRON SATN MFR SERPL: 4 % (ref 20–50)
IRON SERPL-MCNC: 17 UG/DL (ref 50–170)
LYMPHOCYTES # BLD AUTO: 1.81 X10(3)/MCL (ref 0.6–4.6)
LYMPHOCYTES NFR BLD AUTO: 30.5 %
MCH RBC QN AUTO: 20.5 PG (ref 27–31)
MCHC RBC AUTO-ENTMCNC: 27.8 MG/DL (ref 33–36)
MCV RBC AUTO: 74 FL (ref 80–94)
MONOCYTES # BLD AUTO: 0.54 X10(3)/MCL (ref 0.1–1.3)
MONOCYTES NFR BLD AUTO: 9.1 %
NEUTROPHILS # BLD AUTO: 3.4 X10(3)/MCL (ref 2.1–9.2)
NEUTROPHILS NFR BLD AUTO: 57.4 %
NRBC BLD AUTO-RTO: 0 %
PLATELET # BLD AUTO: 396 X10(3)/MCL (ref 130–400)
PMV BLD AUTO: 10.3 FL (ref 7.4–10.4)
POTASSIUM SERPL-SCNC: 3.9 MMOL/L (ref 3.5–5.1)
PROT SERPL-MCNC: 7.6 GM/DL (ref 6.4–8.3)
RBC # BLD AUTO: 4.38 X10(6)/MCL (ref 4.2–5.4)
SODIUM SERPL-SCNC: 137 MMOL/L (ref 136–145)
TIBC SERPL-MCNC: 367 UG/DL (ref 70–310)
TIBC SERPL-MCNC: 384 UG/DL (ref 250–450)
TRANSFERRIN SERPL-MCNC: 351 MG/DL (ref 180–382)
WBC # SPEC AUTO: 5.9 X10(3)/MCL (ref 4.5–11.5)

## 2022-12-06 PROCEDURE — 85025 COMPLETE CBC W/AUTO DIFF WBC: CPT | Performed by: NURSE PRACTITIONER

## 2022-12-06 PROCEDURE — 3008F BODY MASS INDEX DOCD: CPT | Mod: CPTII,,, | Performed by: NURSE PRACTITIONER

## 2022-12-06 PROCEDURE — 80053 COMPREHEN METABOLIC PANEL: CPT | Performed by: NURSE PRACTITIONER

## 2022-12-06 PROCEDURE — 1159F PR MEDICATION LIST DOCUMENTED IN MEDICAL RECORD: ICD-10-PCS | Mod: CPTII,,, | Performed by: NURSE PRACTITIONER

## 2022-12-06 PROCEDURE — 99213 OFFICE O/P EST LOW 20 MIN: CPT | Mod: S$PBB,,, | Performed by: NURSE PRACTITIONER

## 2022-12-06 PROCEDURE — 99213 PR OFFICE/OUTPT VISIT, EST, LEVL III, 20-29 MIN: ICD-10-PCS | Mod: S$PBB,,, | Performed by: NURSE PRACTITIONER

## 2022-12-06 PROCEDURE — 3074F SYST BP LT 130 MM HG: CPT | Mod: CPTII,,, | Performed by: NURSE PRACTITIONER

## 2022-12-06 PROCEDURE — 36415 COLL VENOUS BLD VENIPUNCTURE: CPT | Performed by: NURSE PRACTITIONER

## 2022-12-06 PROCEDURE — 99214 OFFICE O/P EST MOD 30 MIN: CPT | Mod: PBBFAC | Performed by: NURSE PRACTITIONER

## 2022-12-06 PROCEDURE — 3008F PR BODY MASS INDEX (BMI) DOCUMENTED: ICD-10-PCS | Mod: CPTII,,, | Performed by: NURSE PRACTITIONER

## 2022-12-06 PROCEDURE — 83540 ASSAY OF IRON: CPT | Performed by: NURSE PRACTITIONER

## 2022-12-06 PROCEDURE — 3079F PR MOST RECENT DIASTOLIC BLOOD PRESSURE 80-89 MM HG: ICD-10-PCS | Mod: CPTII,,, | Performed by: NURSE PRACTITIONER

## 2022-12-06 PROCEDURE — 3079F DIAST BP 80-89 MM HG: CPT | Mod: CPTII,,, | Performed by: NURSE PRACTITIONER

## 2022-12-06 PROCEDURE — 82728 ASSAY OF FERRITIN: CPT | Performed by: NURSE PRACTITIONER

## 2022-12-06 PROCEDURE — 3074F PR MOST RECENT SYSTOLIC BLOOD PRESSURE < 130 MM HG: ICD-10-PCS | Mod: CPTII,,, | Performed by: NURSE PRACTITIONER

## 2022-12-06 PROCEDURE — 1159F MED LIST DOCD IN RCRD: CPT | Mod: CPTII,,, | Performed by: NURSE PRACTITIONER

## 2022-12-06 RX ORDER — MEDROXYPROGESTERONE ACETATE 10 MG/1
10 TABLET ORAL DAILY
Qty: 30 TABLET | Refills: 0 | Status: SHIPPED | OUTPATIENT
Start: 2022-12-06 | End: 2023-01-05

## 2022-12-06 RX ORDER — HEPARIN 100 UNIT/ML
500 SYRINGE INTRAVENOUS
Status: CANCELLED | OUTPATIENT
Start: 2022-12-06

## 2022-12-06 RX ORDER — HEPARIN 100 UNIT/ML
500 SYRINGE INTRAVENOUS
Status: CANCELLED | OUTPATIENT
Start: 2022-12-17

## 2022-12-06 RX ORDER — SODIUM CHLORIDE 0.9 % (FLUSH) 0.9 %
10 SYRINGE (ML) INJECTION
Status: CANCELLED | OUTPATIENT
Start: 2022-12-17

## 2022-12-06 RX ORDER — SODIUM CHLORIDE 0.9 % (FLUSH) 0.9 %
10 SYRINGE (ML) INJECTION
Status: CANCELLED | OUTPATIENT
Start: 2022-12-06

## 2022-12-06 NOTE — PROGRESS NOTES
Chief Complaint   Iron deficiency anemia     Problem list:  1. History of microcytic anemia with suggestion of iron deficiency on partial iron studies  2. Monoclonal gammopathy    Current Treatment:  Feraheme prn    Treatment History:  Feraheme x2 (2019)  Feraheme 510 mg IV x2 (06/15/2020, 2020)  Feraheme 510mg IV x2 (2020 & 2020)    Past medical history: Anemia. Anxiety. Allergic rhinitis. Benign cyst of breast. Cataracts. Constipation. Depression. Exercise intolerance. GERD. History of intracranial hemorrhage. Memory loss. Headaches. Hemorrhoids. Hypertension. Insomnia. Peptic ulcer disease. 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 Peptic ulcer. Bilateral mammoplasty, augmentation, silicone implants, and mastopexy 2017.  x2. Cholecystectomy.  Social history: Single. Lives in [place]. Has 2 children. Currently, unemployed. Used to work for the [employer]. Denies history of tobacco, alcohol, or illicit drug abuse.  Family history: Father experienced pancreatic cancer at age 56; used to smoke. Father is a sister experienced colon cancer in her 30s.  Health maintenance: 10/25/2018: Screening mammogram: No evidence of malignancy.  Menstrual history: Has menstrual cycles once or twice a month; does relate history of blood clots with menstrual flow.    History of present illness:  For details, please see Dr. Leach's last note dated 10/08/2020. Please also refer to assessment and plan section.    Interval History  22:     Physical Exam   Vitals:    22 1515   BP: 119/80   Pulse: 68   Resp: 20   Temp: 97.7 °F (36.5 °C)     General: Alert and oriented. No acute distress  Eye: Pupils are equal, round and reactive to light, Extraocular movements are intact. Normal conjunctiva  HENT: Normocephalic. Oropharynx exam deferred; mask in place due to coronavirus  Neck: Supple,  Non-tender  Respiratory: Respirations are non-labored, Symmetrical chest wall expansion. Breath sounds CTA bilaterally  Cardiovascular: Regular rate, rhythm, Normal peripheral perfusion, No bilateral lower extremity edema  Breast: Exam deferred  Gastrointestinal: Non-distended, Present bowel sounds   GYN: Exam deferred  Genitourinary: Exam deferred  Lymphatics: No lymphadenopathy appreciated  Musculoskeletal: Moves all extremities  Integumentary: Intact. Warm, dry. No rashes, or lesions to visible skin  Neurologic: No focal deficits  Psychiatric: Cooperative. Appropriate mood and affect     Assessment / Plan   1. Iron deficiency anemia   #Severe iron deficiency anemia  -Iron pills caused constipation which caused bleeding hemorrhoids (2018)  -No response to oral iron therapy  -No GI source of bleeding (EGD and colonoscopy: 05/2019)  -Feraheme x2 (06/2019), with good response  -Deficiency recurred 11/2019 but hemoglobin normal  -06/05/2020: Ferritin 3.7, dropped; hemoglobin 9.5 (down from 14.4 on 11/14/2090)  -Feraheme 510 mg IV x2 (06/15/2020, 06/22/2020)  -Iron deficiency and anemia dramatically resolved (07/20/2020)  -Capsule endoscopy (08/20/2020): Negative  -10/07/2020: Remains iron deficient but hemoglobin is normal (transferrin saturation 10.2%, ferritin 10.1, hemoglobin 14)  (Ferritin was 117 on 07/20/2020)  -12/07/2020: Remains iron deficient but hemoglobin remains normal (serum iron 24; TIBC 359; transferrin saturation 7%; ferritin 11.9, was 10.1 on 10/07/2020, 117 on 07/20/2020; hemoglobin 13.4)    2. MGUS (monoclonal gammopathy of unknown significance)   -IgG kappa monoclonal gammopathy (MGUS)  -Diagnosed 04/2018  -Stable as of 11/2019  -05/18/2020, 06/05/2020: Stable  -06/05/2020: 0.3 g/dL IgG kappa monoclonal spike, stable since 11/2019   Patient has low risk IgG kappa monoclonal gammopathy (MGUS).  Repeat paraproteinemia labs in 1 year (05/2021).  -6/16/2021: M spike 0.47g/dL; K/L ratio WNL. No urine M  spike.    # History of noncompliance with follow-up appointments with various specialties    # Thyroid mass/nodule, in need of biopsy (ultrasound 03/12/2019)  Ultrasound (03/12/2019): 3 cm solid nodule lower right thyroid  -Thyroid nodule biopsy scheduled for 01/13/2020  -01/13/2020: Right thyroid nodule, ultrasound-guided FNA: Benign follicular nodule with cystic degeneration    # Health maintenance:  -12/05/2019: Screening mammogram: No evidence of malignancy; BI-RADS 2: Benign  -Screening mammogram in 12/2020, due now; she states that she has appointment next week      Plan:  -Status post Feraheme x2 (06/2020), with dramatic resolution of iron deficiency and anemia  -No GI source of bleeding (EGD, colonoscopy, capsule endoscopy)  -10/07/2020: Hemoglobin normal but iron deficiency has recurred since July 2020  -12/07/2020: Hemoglobin remains normal but iron deficiency persists  -Failed oral iron therapy in the past    -4-6 weeks after any completion of Feraheme, recheck CBC, serum iron, TIBC, and ferritin    Follow up in 8 weeks (TM) with CBC, serum iron, TIBC, and ferritin.  If anemic, will treat with parenteral iron therapy again then repeat iron studies in 6 weeks     Check paraproteinemia labs in 1 year    Discussion:  Had severe iron deficiency anemia, most likely secondary to chronic blood loss from hemorrhoids.  Status post Feraheme 500 mg IV x2, 06/17/2019 and 06/26/2019, with good response documented on labs 08/26/2019  Iron deficiency has recurred (11/14/2019) but hemoglobin remains normal (iron deficiency erythropoiesis)  -->  Will replenish iron stores parenterally because in the past, oral iron therapy caused constipation, hemorrhoids, and hemorrhoidal bleeding.  06/05/2020: Iron deficiency anemia has worsened  Proceed with Feraheme 500 mg IV x2, administered a week apart, and assess response with repeat iron studies in 6 weeks.   Ordered:

## 2022-12-06 NOTE — PROGRESS NOTES
Chief Complaint   Iron deficiency anemia      Problem list:  1. History of microcytic anemia with suggestion of iron deficiency on partial iron studies  2. Monoclonal gammopathy    Current Treatment:  Feraheme prn    Treatment History:  Feraheme x2 (2019)  Feraheme 510 mg IV x2 (06/15/2020, 2020)  Feraheme 510mg IV x2 (2020 & 2020)    Past medical history: Anemia. Anxiety. Allergic rhinitis. Benign cyst of breast. Cataracts. Constipation. Depression. Exercise intolerance. GERD. History of intracranial hemorrhage. Memory loss. Headaches. Hemorrhoids. Hypertension. Insomnia. Peptic ulcer disease. 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 Peptic ulcer. Bilateral mammoplasty, augmentation, silicone implants, and mastopexy 2017.  x2. Cholecystectomy.  Social history: Single. Lives in [place]. Has 2 children. Currently, unemployed. Used to work for the [employer]. Denies history of tobacco, alcohol, or illicit drug abuse.  Family history: Father experienced pancreatic cancer at age 56; used to smoke. Father is a sister experienced colon cancer in her 30s.  Health maintenance: 10/25/2018: Screening mammogram: No evidence of malignancy.  Menstrual history: Has menstrual cycles once or twice a month; does relate history of blood clots with menstrual flow.    History of present illness:  For details, please see Dr. Leach's last note dated 10/08/2020. Please also refer to assessment and plan section.    Interval History  22: Clinic follow up today for iron deficiency anemia and apparently in the past week went to our emergency department for generalized weakness and fatigue. She reportedly continues on oral iron therapy she notes daily and also provera given her history of heavy menses and irregular bleeding. In the past she has required subsequent parenteral iron infusions. She notes having  not followed with gyn in some time and is needing to establish care and also the same for an ophthalmologist reporting a long history of glaucoma. Denies CP, palpitations. Breathing stable. Fatigue noted today, not progressive in nature. Denies evidence today of abnormal bleeding or other concerns.      Physical Exam       Vitals:     12/06/22 1515   BP: 119/80   Pulse: 68   Resp: 20   Temp: 97.7 °F (36.5 °C)      General: Alert and oriented. No acute distress; + fatigue   Eye: Pupils are equal, round and reactive to light, Extraocular movements are intact. Normal conjunctiva  HENT: Normocephalic. Oropharynx exam deferred; mask in place due to coronavirus  Neck: Supple, Non-tender  Respiratory: Respirations are non-labored, Symmetrical chest wall expansion. Breath sounds CTA bilaterally  Cardiovascular: Regular rate, rhythm, Normal peripheral perfusion, No bilateral lower extremity edema  Breast: Exam deferred  Gastrointestinal: Non-distended, Present bowel sounds   GYN: Exam deferred  Genitourinary: Exam deferred  Lymphatics: No lymphadenopathy appreciated  Musculoskeletal: Moves all extremities  Integumentary: Intact. Warm, dry. No rashes, or lesions to visible skin  Neurologic: No focal deficits  Psychiatric: Cooperative. Appropriate mood and affect      Assessment / Plan   1. Iron deficiency anemia   -Severe iron deficiency anemia  -Iron pills caused constipation which caused bleeding hemorrhoids (2018)  -No response to oral iron therapy  -No GI source of bleeding (EGD and colonoscopy: 05/2019)  -Feraheme x2 (06/2019), with good response  -Deficiency recurred 11/2019 but hemoglobin normal  -06/05/2020: Ferritin 3.7, dropped; hemoglobin 9.5 (down from 14.4 on 11/14/2090)  -Feraheme 510 mg IV x2 (06/15/2020, 06/22/2020)  -Iron deficiency and anemia dramatically resolved (07/20/2020)  -Capsule endoscopy (08/20/2020): Negative  -10/07/2020: Remains iron deficient but hemoglobin is normal (transferrin saturation  10.2%, ferritin 10.1, hemoglobin 14)  (Ferritin was 117 on 07/20/2020)  -12/07/2020: Remains iron deficient but hemoglobin remains normal (serum iron 24; TIBC 359; transferrin saturation 7%; ferritin 11.9, was 10.1 on 10/07/2020, 117 on 07/20/2020; hemoglobin 13.4)     2. MGUS (monoclonal gammopathy of unknown significance)   -IgG kappa monoclonal gammopathy (MGUS)  -Diagnosed 04/2018  -Stable as of 11/2019  -05/18/2020, 06/05/2020: Stable  -06/05/2020: 0.3 g/dL IgG kappa monoclonal spike, stable since 11/2019   Patient has low risk IgG kappa monoclonal gammopathy (MGUS).  Repeat paraproteinemia labs in 1 year (05/2021).  -6/16/2021: M spike 0.47g/dL; K/L ratio WNL. No urine M spike.    # History of noncompliance with follow-up appointments with various specialties    # Thyroid mass/nodule, in need of biopsy (ultrasound 03/12/2019)  Ultrasound (03/12/2019): 3 cm solid nodule lower right thyroid  -Thyroid nodule biopsy scheduled for 01/13/2020  -01/13/2020: Right thyroid nodule, ultrasound-guided FNA: Benign follicular nodule with cystic degeneration    # Health maintenance:  -12/05/2019: Screening mammogram: No evidence of malignancy; BI-RADS 2: Benign  -Screening mammogram in 12/16/2020 - no mammographic concerns for malignancy     Plan:  -Status post Feraheme x2 (06/2020), with dramatic resolution of iron deficiency and anemia  -No GI source of bleeding (EGD, colonoscopy, capsule endoscopy)  -10/07/2020: Hemoglobin normal but iron deficiency has recurred since July 2020  -12/07/2020: Hemoglobin remains normal but iron deficiency persists  -Failed oral iron therapy in the past    -4-6 weeks after any completion of Feraheme, recheck CBC, serum iron, TIBC, and ferritin    Check paraproteinemia labs at next visit     Referral to GYN (heavy menses) and ophthalmology (reported history of glaucoma) to re-establish care     Bilateral screening MMG was due 12/2021 - overdue therefore ordered now (pending)    RTC for labs,  NP visit in 8 weeks (will repeat iron studies at this time)    Today's iron studies pending; she will notified by phone if her results require attention  If anemic, will treat with parenteral iron therapy (feraheme) again then repeat iron studies in 6 weeks     Addendum: Notified Ms Pratt via phone today, 12/7/22 re: iron studies revealing inadequate ferritin; will therefore plan for two dose of IV iron (Feraheme)    Discussion:  Had severe iron deficiency anemia, most likely secondary to chronic blood loss from hemorrhoids.  Status post Feraheme 500 mg IV x2, 06/17/2019 and 06/26/2019, with good response documented on labs 08/26/2019  Iron deficiency has recurred (11/14/2019) but hemoglobin remains normal (iron deficiency erythropoiesis)  -->  Will replenish iron stores parenterally because in the past, oral iron therapy caused constipation, hemorrhoids, and hemorrhoidal bleeding.  06/05/2020: Iron deficiency anemia has worsened  Proceed with Feraheme 500 mg IV x2, administered a week apart, and assess response with repeat iron studies in 6 weeks.   Ordered:

## 2022-12-16 ENCOUNTER — INFUSION (OUTPATIENT)
Dept: INFUSION THERAPY | Facility: HOSPITAL | Age: 47
End: 2022-12-16
Attending: INTERNAL MEDICINE
Payer: MEDICAID

## 2022-12-16 VITALS
DIASTOLIC BLOOD PRESSURE: 83 MMHG | SYSTOLIC BLOOD PRESSURE: 125 MMHG | WEIGHT: 161.63 LBS | HEIGHT: 65 IN | HEART RATE: 69 BPM | OXYGEN SATURATION: 100 % | RESPIRATION RATE: 20 BRPM | BODY MASS INDEX: 26.93 KG/M2 | TEMPERATURE: 98 F

## 2022-12-16 DIAGNOSIS — D50.0 IRON DEFICIENCY ANEMIA SECONDARY TO BLOOD LOSS (CHRONIC): ICD-10-CM

## 2022-12-16 DIAGNOSIS — Z86.2 HISTORY OF IRON DEFICIENCY ANEMIA: Primary | ICD-10-CM

## 2022-12-16 PROCEDURE — 25000003 PHARM REV CODE 250: Performed by: NURSE PRACTITIONER

## 2022-12-16 PROCEDURE — 63600175 PHARM REV CODE 636 W HCPCS: Mod: JG | Performed by: NURSE PRACTITIONER

## 2022-12-16 RX ORDER — SODIUM CHLORIDE 0.9 % (FLUSH) 0.9 %
10 SYRINGE (ML) INJECTION
Status: DISCONTINUED | OUTPATIENT
Start: 2022-12-16 | End: 2023-05-23

## 2022-12-16 RX ORDER — HEPARIN 100 UNIT/ML
500 SYRINGE INTRAVENOUS
Status: DISCONTINUED | OUTPATIENT
Start: 2022-12-16 | End: 2023-05-23

## 2022-12-16 RX ADMIN — SODIUM CHLORIDE: 9 INJECTION, SOLUTION INTRAVENOUS at 08:12

## 2022-12-16 RX ADMIN — FERUMOXYTOL 510 MG: 510 INJECTION INTRAVENOUS at 08:12

## 2022-12-16 NOTE — LETTER
December 16, 2022      [facility]  [address] [address]  Phone: [phone]  Fax: [phone]       Patient: Pretty Pratt   YOB: 1975  Date of Visit: 12/16/2022    To Whom It May Concern:    Cornelio Pratt  was at [facility] on 12/16/2022. The patient may return to work no restrictions. If you have any questions or concerns, or if I can be of further assistance, please do not hesitate to contact me.    Sincerely,    Fabiola Ryder RN

## 2022-12-16 NOTE — LETTER
December 16, 2022      [facility]  [address] [address]  Phone: [phone]  Fax: [phone]       Patient: Pretty Pratt   YOB: 1975  Date of Visit: 12/16/2022    To Whom It May Concern:    Cornelio Pratt  was at [facility] on 12/16/2022. The patient may return to work no restrictions on 12/16/2022. If you have any questions or concerns, or if I can be of further assistance, please do not hesitate to contact me.    Sincerely,    Fabiola Ryder RN

## 2022-12-16 NOTE — LETTER
December 16, 2022      [facility]  [address] [address]  Phone: [phone]  Fax: [phone]       Patient: Pretty Pratt   YOB: 1975  Date of Visit: 12/16/2022    To Whom It May Concern:    Cornelio Pratt  was at [facility] on 12/16/2022. The patient may return to work/school {With/no:23505} restrictions. If you have any questions or concerns, or if I can be of further assistance, please do not hesitate to contact me.    Sincerely,    Fabiola Ryder RN

## 2022-12-19 ENCOUNTER — TELEPHONE (OUTPATIENT)
Dept: GYNECOLOGY | Facility: CLINIC | Age: 47
End: 2022-12-19
Payer: MEDICAID

## 2022-12-19 NOTE — TELEPHONE ENCOUNTER
----- Message from Debbie Cheatham MA sent at 12/16/2022  3:56 PM CST -----  Regarding: Medication Refill  Above patient called in for a medication refill, patient unsure of name of medication...stated that she takes the medication TID. Also, patient complained of having severe abdominal cramping and would like to be scheduled for an appointment.  Called patient regarding message left with front office staff, no answer .  [facility]

## 2022-12-22 ENCOUNTER — TELEPHONE (OUTPATIENT)
Dept: GYNECOLOGY | Facility: CLINIC | Age: 47
End: 2022-12-22
Payer: MEDICAID

## 2022-12-22 NOTE — TELEPHONE ENCOUNTER
----- Message from Madelin Paz sent at 12/22/2022  2:22 PM CST -----  Regarding: refill  Above pt returning a phone call for a refill of her RX. Please Advise Thanks

## 2022-12-28 ENCOUNTER — INFUSION (OUTPATIENT)
Dept: INFUSION THERAPY | Facility: HOSPITAL | Age: 47
End: 2022-12-28
Attending: INTERNAL MEDICINE
Payer: MEDICAID

## 2022-12-28 VITALS
RESPIRATION RATE: 20 BRPM | DIASTOLIC BLOOD PRESSURE: 78 MMHG | OXYGEN SATURATION: 100 % | SYSTOLIC BLOOD PRESSURE: 127 MMHG | TEMPERATURE: 99 F | HEART RATE: 83 BPM

## 2022-12-28 DIAGNOSIS — D50.0 IRON DEFICIENCY ANEMIA SECONDARY TO BLOOD LOSS (CHRONIC): ICD-10-CM

## 2022-12-28 DIAGNOSIS — Z86.2 HISTORY OF IRON DEFICIENCY ANEMIA: Primary | ICD-10-CM

## 2022-12-28 PROCEDURE — 63600175 PHARM REV CODE 636 W HCPCS: Mod: JG | Performed by: NURSE PRACTITIONER

## 2022-12-28 PROCEDURE — 25000003 PHARM REV CODE 250: Performed by: NURSE PRACTITIONER

## 2022-12-28 PROCEDURE — 96365 THER/PROPH/DIAG IV INF INIT: CPT

## 2022-12-28 RX ORDER — SODIUM CHLORIDE 0.9 % (FLUSH) 0.9 %
10 SYRINGE (ML) INJECTION
Status: DISCONTINUED | OUTPATIENT
Start: 2022-12-28 | End: 2022-12-28 | Stop reason: HOSPADM

## 2022-12-28 RX ORDER — HEPARIN 100 UNIT/ML
500 SYRINGE INTRAVENOUS
Status: DISCONTINUED | OUTPATIENT
Start: 2022-12-28 | End: 2022-12-28 | Stop reason: HOSPADM

## 2022-12-28 RX ADMIN — SODIUM CHLORIDE: 9 INJECTION, SOLUTION INTRAVENOUS at 01:12

## 2022-12-28 RX ADMIN — FERUMOXYTOL 510 MG: 510 INJECTION INTRAVENOUS at 01:12

## 2022-12-28 NOTE — NURSING
1257  Pt here for #2 feraheme.  Pt accomp by her daughter.  Denies any pain.  Does report a little coughing and fatigue.

## 2023-01-04 ENCOUNTER — HOSPITAL ENCOUNTER (OUTPATIENT)
Dept: RADIOLOGY | Facility: HOSPITAL | Age: 48
Discharge: HOME | End: 2023-01-04
Attending: NURSE PRACTITIONER
Payer: MEDICAID

## 2023-01-04 DIAGNOSIS — Z12.31 BREAST CANCER SCREENING BY MAMMOGRAM: ICD-10-CM

## 2023-01-04 PROCEDURE — 77063 MAMMO DIGITAL SCREENING BILAT WITH TOMO: ICD-10-PCS | Mod: 26,,, | Performed by: RADIOLOGY

## 2023-01-04 PROCEDURE — 77067 SCR MAMMO BI INCL CAD: CPT | Mod: 26,,, | Performed by: RADIOLOGY

## 2023-01-04 PROCEDURE — 77067 MAMMO DIGITAL SCREENING BILAT WITH TOMO: ICD-10-PCS | Mod: 26,,, | Performed by: RADIOLOGY

## 2023-01-04 PROCEDURE — 77063 BREAST TOMOSYNTHESIS BI: CPT | Mod: 26,,, | Performed by: RADIOLOGY

## 2023-01-04 PROCEDURE — 77067 SCR MAMMO BI INCL CAD: CPT | Mod: TC

## 2023-01-27 ENCOUNTER — OFFICE VISIT (OUTPATIENT)
Dept: FAMILY MEDICINE | Facility: CLINIC | Age: 48
End: 2023-01-27
Payer: MEDICAID

## 2023-01-27 VITALS
WEIGHT: 159 LBS | HEIGHT: 62 IN | RESPIRATION RATE: 18 BRPM | HEART RATE: 64 BPM | SYSTOLIC BLOOD PRESSURE: 117 MMHG | TEMPERATURE: 98 F | DIASTOLIC BLOOD PRESSURE: 82 MMHG | BODY MASS INDEX: 29.26 KG/M2 | OXYGEN SATURATION: 98 %

## 2023-01-27 DIAGNOSIS — K21.9 GASTROESOPHAGEAL REFLUX DISEASE, UNSPECIFIED WHETHER ESOPHAGITIS PRESENT: ICD-10-CM

## 2023-01-27 DIAGNOSIS — E55.9 VITAMIN D DEFICIENCY: Primary | ICD-10-CM

## 2023-01-27 DIAGNOSIS — E78.5 HYPERLIPIDEMIA, UNSPECIFIED HYPERLIPIDEMIA TYPE: ICD-10-CM

## 2023-01-27 DIAGNOSIS — E04.1 THYROID NODULE: ICD-10-CM

## 2023-01-27 DIAGNOSIS — I10 HYPERTENSION, UNSPECIFIED TYPE: ICD-10-CM

## 2023-01-27 DIAGNOSIS — G25.81 RLS (RESTLESS LEGS SYNDROME): ICD-10-CM

## 2023-01-27 PROBLEM — K64.2 GRADE III HEMORRHOIDS: Status: ACTIVE | Noted: 2023-01-27

## 2023-01-27 PROBLEM — K44.9 HIATAL HERNIA: Status: ACTIVE | Noted: 2023-01-27

## 2023-01-27 PROBLEM — R91.1 PULMONARY NODULE: Status: ACTIVE | Noted: 2023-01-27

## 2023-01-27 PROBLEM — D47.2 MONOCLONAL GAMMOPATHY OF UNKNOWN SIGNIFICANCE (MGUS): Status: ACTIVE | Noted: 2023-01-27

## 2023-01-27 PROBLEM — I61.9 ICH (INTRACEREBRAL HEMORRHAGE): Status: ACTIVE | Noted: 2023-01-27

## 2023-01-27 PROBLEM — H40.9 GLAUCOMA: Status: ACTIVE | Noted: 2023-01-27

## 2023-01-27 PROBLEM — J30.9 ALLERGIC RHINITIS: Status: ACTIVE | Noted: 2019-11-04

## 2023-01-27 LAB — DEPRECATED CALCIDIOL+CALCIFEROL SERPL-MC: 23.5 NG/ML (ref 30–80)

## 2023-01-27 PROCEDURE — 99215 OFFICE O/P EST HI 40 MIN: CPT | Mod: PBBFAC | Performed by: FAMILY MEDICINE

## 2023-01-27 PROCEDURE — 36415 COLL VENOUS BLD VENIPUNCTURE: CPT

## 2023-01-27 PROCEDURE — 82306 VITAMIN D 25 HYDROXY: CPT

## 2023-01-27 RX ORDER — FLUOXETINE 10 MG/1
10 CAPSULE ORAL DAILY
Qty: 30 CAPSULE | Refills: 5 | Status: SHIPPED | OUTPATIENT
Start: 2023-01-27 | End: 2023-04-04 | Stop reason: SDUPTHER

## 2023-01-27 RX ORDER — PANTOPRAZOLE SODIUM 40 MG/1
40 TABLET, DELAYED RELEASE ORAL DAILY
Qty: 30 TABLET | Refills: 5 | Status: SHIPPED | OUTPATIENT
Start: 2023-01-27 | End: 2023-06-06 | Stop reason: SDUPTHER

## 2023-01-27 NOTE — PROGRESS NOTES
Subjective:       Patient ID: Pretty Pratt is a 47 y.o. female.        Chief Complaint: Establish Care  Anemia, HTN    HPI  Presents alone to initial visit in [facility]   Provides  own history easily  Chart updated        HTN: has been on Lopressor, controlled  HLD: LDL 78, 3/2021  Anxiety: perimenopausal and feels anxiety worse, irritable and very sensitive    ICH: see plan overview, did have severe HTN at the time, pos AVM    AUB: EMB 7/2022 negative, declined IUD, no menses for couple of mos, thinks she is perimenopausal, only started with little heavier menses about 6/2022, prior to that did not have heavy menses and she doesn't think this would explain her anemia    GERD: HB, belching, little regurgitation, a lot of GI upset in general, on Carafate BID- incomplete relief, tries to follow bland diet, prev antolin    JAQUELINE: severe, ongoing since about 2017 per pt, has received approx 5-10 ferriheme injections, EGD ( incomplete due to desat but grossly normal), Colon was clear - both in 2019, PCE 8/2020 - -very normal, CT A/P clear 2017, ferriheme infusions often hold her for approx 3 mos - feels good, nl energy, then gets very weal and tired as counts drop      Current Outpatient Medications   Medication Instructions    ezetimibe (ZETIA) 10 mg, Oral, Daily    ferrous sulfate 325 mg, Oral, Daily    fluticasone propionate (FLONASE) 50 mcg/actuation nasal spray 1 spray, Each Nostril, Daily    gabapentin (NEURONTIN) 300 mg, Oral, Nightly    latanoprost 0.005 % ophthalmic solution 1 drop, Both Eyes, Nightly    loratadine (CLARITIN) 10 mg, Oral    medroxyPROGESTERone (PROVERA) 10 mg, Oral, 3 times daily, Then take one tablet daily until able to be seen at [facility]    metoprolol tartrate (LOPRESSOR) 25 mg, Oral, 2 times daily    ondansetron (ZOFRAN) 4 mg, Oral, 2 times daily PRN    paroxetine (PAXIL) 10 mg, Oral, Daily    pravastatin (PRAVACHOL) 20 mg, Oral    sucralfate (CARAFATE) 1 g, Oral, 2 times daily  "        ROS  Constitutional: no fever, fatigue, weakness  ENT: no sore throat, ear pain,  nasal congestion/drainage  Respiratory: no cough, wheezing, SOB  Cardiovascular: no CP, palpitations, edema  Gastrointestinal: no NVD, ABD pain, blood in stool, melena  Genitourinary: no dysuria, frequency, urgency, hematuria  Hema/Lymph: no abnormal bruising or bleeding    PE  Vitals:    01/27/23 1254   BP: 117/82   BP Location: Right arm   Patient Position: Sitting   BP Method: Medium (Automatic)   Pulse: 64   Resp: 18   Temp: 97.7 °F (36.5 °C)   TempSrc: Oral   SpO2: 98%   Weight: 72.1 kg (159 lb)   Height: 5' 2" (1.575 m)     General - NAD, comfortable  HEENT- nl TM,EAC, kolton w/o redness  Neck - no node, mass ,thyromegaly/mass  Respiratory - CTA BL, no distress  Cardiovascular - RRR, no murmur  Gastrointestinal - soft NT, no mass, nl BS x 4     Assessment  1. Vitamin D deficiency    2. Gastroesophageal reflux disease, unspecified whether esophagitis present    3. Hypertension, unspecified type    4. Hyperlipidemia, unspecified hyperlipidemia type    5. Thyroid nodule    6.     Anxiety      Plan  Start Protonix 40mg   Hold Zetia due to Gi SE  Thyroid US    Vt D level  Stop Paxil, start Prozac  See GYN 3.2023 as chaka, 2/2023 Oncology  Consider change to Icar C  Rtc 4-6 weeks      "

## 2023-02-03 DIAGNOSIS — Z86.2 HISTORY OF IRON DEFICIENCY ANEMIA: Primary | ICD-10-CM

## 2023-02-06 ENCOUNTER — OFFICE VISIT (OUTPATIENT)
Dept: HEMATOLOGY/ONCOLOGY | Facility: CLINIC | Age: 48
End: 2023-02-06
Payer: MEDICAID

## 2023-02-06 VITALS
HEART RATE: 83 BPM | HEIGHT: 62 IN | RESPIRATION RATE: 20 BRPM | WEIGHT: 161.81 LBS | SYSTOLIC BLOOD PRESSURE: 131 MMHG | TEMPERATURE: 99 F | BODY MASS INDEX: 29.78 KG/M2 | DIASTOLIC BLOOD PRESSURE: 86 MMHG | OXYGEN SATURATION: 99 %

## 2023-02-06 DIAGNOSIS — Q15.9 EYE ABNORMALITIES: ICD-10-CM

## 2023-02-06 DIAGNOSIS — Z86.2 HISTORY OF IRON DEFICIENCY ANEMIA: Primary | ICD-10-CM

## 2023-02-06 DIAGNOSIS — F41.9 ANXIETY: ICD-10-CM

## 2023-02-06 DIAGNOSIS — E87.6 HYPOKALEMIA: ICD-10-CM

## 2023-02-06 PROCEDURE — 99214 OFFICE O/P EST MOD 30 MIN: CPT | Mod: PBBFAC

## 2023-02-06 PROCEDURE — 1159F MED LIST DOCD IN RCRD: CPT | Mod: CPTII,,,

## 2023-02-06 PROCEDURE — 3008F BODY MASS INDEX DOCD: CPT | Mod: CPTII,,,

## 2023-02-06 PROCEDURE — 1159F PR MEDICATION LIST DOCUMENTED IN MEDICAL RECORD: ICD-10-PCS | Mod: CPTII,,,

## 2023-02-06 PROCEDURE — 3079F DIAST BP 80-89 MM HG: CPT | Mod: CPTII,,,

## 2023-02-06 PROCEDURE — 99214 OFFICE O/P EST MOD 30 MIN: CPT | Mod: S$PBB,,,

## 2023-02-06 PROCEDURE — 3075F PR MOST RECENT SYSTOLIC BLOOD PRESS GE 130-139MM HG: ICD-10-PCS | Mod: CPTII,,,

## 2023-02-06 PROCEDURE — 1160F PR REVIEW ALL MEDS BY PRESCRIBER/CLIN PHARMACIST DOCUMENTED: ICD-10-PCS | Mod: CPTII,,,

## 2023-02-06 PROCEDURE — 1160F RVW MEDS BY RX/DR IN RCRD: CPT | Mod: CPTII,,,

## 2023-02-06 PROCEDURE — 3008F PR BODY MASS INDEX (BMI) DOCUMENTED: ICD-10-PCS | Mod: CPTII,,,

## 2023-02-06 PROCEDURE — 99214 PR OFFICE/OUTPT VISIT, EST, LEVL IV, 30-39 MIN: ICD-10-PCS | Mod: S$PBB,,,

## 2023-02-06 PROCEDURE — 3075F SYST BP GE 130 - 139MM HG: CPT | Mod: CPTII,,,

## 2023-02-06 PROCEDURE — 3079F PR MOST RECENT DIASTOLIC BLOOD PRESSURE 80-89 MM HG: ICD-10-PCS | Mod: CPTII,,,

## 2023-02-06 RX ORDER — POTASSIUM CHLORIDE 20 MEQ/1
20 TABLET, EXTENDED RELEASE ORAL DAILY
Qty: 14 TABLET | Refills: 0 | Status: SHIPPED | OUTPATIENT
Start: 2023-02-06 | End: 2023-02-20

## 2023-02-06 NOTE — PROGRESS NOTES
Chief Complaint   Iron deficiency anemia      Problem list:  1. History of microcytic anemia with suggestion of iron deficiency on partial iron studies  2. Monoclonal gammopathy    Current Treatment:  Feraheme prn    Treatment History:  Feraheme x2 (2019)  Feraheme 510 mg IV x2 (06/15/2020, 2020)  Feraheme 510mg IV x2 (2020 & 2020)  Feraheme 510mg IV x2 (2022  & 2022)      Past medical history: Anemia. Anxiety. Allergic rhinitis. Benign cyst of breast. Cataracts. Constipation. Depression. Exercise intolerance. GERD. History of intracranial hemorrhage. Memory loss. Headaches. Hemorrhoids. Hypertension. Insomnia. Peptic ulcer disease. 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 Peptic ulcer. Bilateral mammoplasty, augmentation, silicone implants, and mastopexy 2017.  x2. Cholecystectomy.  Social history: Single. Lives in [place]. Has 2 children. Currently, unemployed. Used to work for the [employer]. Denies history of tobacco, alcohol, or illicit drug abuse.  Family history: Father experienced pancreatic cancer at age 56; used to smoke. Father is a sister experienced colon cancer in her 30s.  Health maintenance: 10/25/2018: Screening mammogram: No evidence of malignancy.  Menstrual history: Has menstrual cycles once or twice a month; does relate history of blood clots with menstrual flow.    History of present illness:  For details, please see Dr. Leach's last note dated 10/08/2020. Please also refer to assessment and plan section.    Interval History  23: Clinic follow up alone today for iron deficiency anemia after receiving Feraheme 510mg IV x2 with last dose being 22.  She reportedly continues on oral iron therapy she notes daily and also provera given her history of heavy menses and irregular bleeding. I  She has since seen GYN where she states an EMB was performed  "without any abnormalities found.  She has since stopped bleeding. In the past she has required subsequent parenteral iron infusions. She reports chronic issues with her vision but is scheduled to see the ophthalmologist for  a long history of glaucoma. Denies CP, palpitations. Breathing stable. She states have been having a difficult time in her current position at her job which makes her feel emotional and " depressed". She would rather not see anyone for her concerns at this time. Labs reviewed; iron studies with improvement. No iron infusion needed at this time. Will continue to follow. Mild hypokalemia noted; advised will send oral supplement of potassium to pharmacy and to take once daily for 2 week and will repeat labs.  Denies evidence today of abnormal bleeding or other concerns.      Physical Exam       Vitals:     12/06/22 1515   BP: 119/80   Pulse: 68   Resp: 20   Temp: 97.7 °F (36.5 °C)      General: Alert and oriented. No acute distress; + fatigue   Eye: Pupils are equal, round and reactive to light, Extraocular movements are intact. Normal conjunctiva  HENT: Normocephalic. Oropharynx exam deferred; mask in place due to coronavirus  Neck: Supple, Non-tender  Respiratory: Respirations are non-labored, Symmetrical chest wall expansion. Breath sounds CTA bilaterally  Cardiovascular: Regular rate, rhythm, Normal peripheral perfusion, No bilateral lower extremity edema  Breast: Exam deferred  Gastrointestinal: Non-distended, Present bowel sounds   GYN: Exam deferred  Genitourinary: Exam deferred  Lymphatics: No lymphadenopathy appreciated  Musculoskeletal: Moves all extremities  Integumentary: Intact. Warm, dry. No rashes, or lesions to visible skin  Neurologic: No focal deficits  Psychiatric: Cooperative. Appropriate mood and affect      Assessment / Plan   1. Iron deficiency anemia   -Severe iron deficiency anemia  -Iron pills caused constipation which caused bleeding hemorrhoids (2018)  -No response to " oral iron therapy  -No GI source of bleeding (EGD and colonoscopy: 05/2019)  -Feraheme x2 (06/2019), with good response  -Deficiency recurred 11/2019 but hemoglobin normal  -06/05/2020: Ferritin 3.7, dropped; hemoglobin 9.5 (down from 14.4 on 11/14/2090)  -Feraheme 510 mg IV x2 (06/15/2020, 06/22/2020)  -Iron deficiency and anemia dramatically resolved (07/20/2020)  -Capsule endoscopy (08/20/2020): Negative  -10/07/2020: Remains iron deficient but hemoglobin is normal (transferrin saturation 10.2%, ferritin 10.1, hemoglobin 14)  (Ferritin was 117 on 07/20/2020)  -12/07/2020: Remains iron deficient but hemoglobin remains normal (serum iron 24; TIBC 359; transferrin saturation 7%; ferritin 11.9, was 10.1 on 10/07/2020, 117 on 07/20/2020; hemoglobin 13.4)  Feraheme 510mg IV x2 (12/16/2022, 12/28/2022)     2. MGUS (monoclonal gammopathy of unknown significance)   -IgG kappa monoclonal gammopathy (MGUS)  -Diagnosed 04/2018  -Stable as of 11/2019  -05/18/2020, 06/05/2020: Stable  -06/05/2020: 0.3 g/dL IgG kappa monoclonal spike, stable since 11/2019   Patient has low risk IgG kappa monoclonal gammopathy (MGUS).  Repeat paraproteinemia labs in 1 year (05/2021).  -6/16/2021: M spike 0.47g/dL; K/L ratio WNL. No urine M spike.    # History of noncompliance with follow-up appointments with various specialties    # Thyroid mass/nodule, in need of biopsy (ultrasound 03/12/2019)  Ultrasound (03/12/2019): 3 cm solid nodule lower right thyroid  -Thyroid nodule biopsy scheduled for 01/13/2020  -01/13/2020: Right thyroid nodule, ultrasound-guided FNA: Benign follicular nodule with cystic degeneration    # Health maintenance:  -12/05/2019: Screening mammogram: No evidence of malignancy; BI-RADS 2: Benign  -Screening mammogram in 12/16/2020 - no mammographic concerns for malignancy     Plan:  -Status post Feraheme x2 (06/2020), with dramatic resolution of iron deficiency and anemia  -No GI source of bleeding (EGD, colonoscopy, capsule  endoscopy)  -10/07/2020: Hemoglobin normal but iron deficiency has recurred since July 2020  -12/07/2020: Hemoglobin remains normal but iron deficiency persists  -Failed oral iron therapy in the past      Labs reviewed; iron studies with improvement continue oral iron supplement; mild hypokalemia; Oral Potassium 20 meQ once daily for 2 weeks; then repeat labs in 2 weeks for follow up response; will call with results    Follow up with Opthalmology as scheduled 3/7/2023    Follow up with GYN as scheduled    Annual MMG due 1/2024    RTC for labs, NP visit in 3 months (will repeat iron studies at this time) Today's iron studies with improvement; therefore will continue oral iron supplement and follow up with repeat labs in 3 months

## 2023-02-22 ENCOUNTER — APPOINTMENT (OUTPATIENT)
Dept: HEMATOLOGY/ONCOLOGY | Facility: CLINIC | Age: 48
End: 2023-02-22
Payer: MEDICAID

## 2023-02-22 DIAGNOSIS — Z86.2 HISTORY OF IRON DEFICIENCY ANEMIA: ICD-10-CM

## 2023-02-22 LAB
ALBUMIN SERPL-MCNC: 3.7 G/DL (ref 3.5–5)
ALBUMIN/GLOB SERPL: 1.1 RATIO (ref 1.1–2)
ALP SERPL-CCNC: 67 UNIT/L (ref 40–150)
ALT SERPL-CCNC: 22 UNIT/L (ref 0–55)
AST SERPL-CCNC: 23 UNIT/L (ref 5–34)
BASOPHILS # BLD AUTO: 0.04 X10(3)/MCL (ref 0–0.2)
BASOPHILS NFR BLD AUTO: 0.7 %
BILIRUBIN DIRECT+TOT PNL SERPL-MCNC: 0.3 MG/DL
BUN SERPL-MCNC: 11.1 MG/DL (ref 7–18.7)
CALCIUM SERPL-MCNC: 8.9 MG/DL (ref 8.4–10.2)
CHLORIDE SERPL-SCNC: 108 MMOL/L (ref 98–107)
CO2 SERPL-SCNC: 24 MMOL/L (ref 22–29)
CREAT SERPL-MCNC: 0.72 MG/DL (ref 0.55–1.02)
EOSINOPHIL # BLD AUTO: 0.12 X10(3)/MCL (ref 0–0.9)
EOSINOPHIL NFR BLD AUTO: 2.1 %
ERYTHROCYTE [DISTWIDTH] IN BLOOD BY AUTOMATED COUNT: 19.3 % (ref 11.5–17)
FERRITIN SERPL-MCNC: 34.01 NG/ML (ref 4.63–204)
FOLATE SERPL-MCNC: 12.2 NG/ML (ref 7–31.4)
GFR SERPLBLD CREATININE-BSD FMLA CKD-EPI: >60 MLS/MIN/1.73/M2
GLOBULIN SER-MCNC: 3.3 GM/DL (ref 2.4–3.5)
GLUCOSE SERPL-MCNC: 81 MG/DL (ref 74–100)
HCT VFR BLD AUTO: 41.7 % (ref 37–47)
HGB BLD-MCNC: 13.5 G/DL (ref 12–16)
IMM GRANULOCYTES # BLD AUTO: 0.01 X10(3)/MCL (ref 0–0.04)
IMM GRANULOCYTES NFR BLD AUTO: 0.2 %
IRON SATN MFR SERPL: 20 % (ref 20–50)
IRON SERPL-MCNC: 60 UG/DL (ref 50–170)
LYMPHOCYTES # BLD AUTO: 1.75 X10(3)/MCL (ref 0.6–4.6)
LYMPHOCYTES NFR BLD AUTO: 30.8 %
MCH RBC QN AUTO: 28 PG
MCHC RBC AUTO-ENTMCNC: 32.4 G/DL (ref 33–36)
MCV RBC AUTO: 86.5 FL (ref 80–94)
MONOCYTES # BLD AUTO: 0.5 X10(3)/MCL (ref 0.1–1.3)
MONOCYTES NFR BLD AUTO: 8.8 %
NEUTROPHILS # BLD AUTO: 3.26 X10(3)/MCL (ref 2.1–9.2)
NEUTROPHILS NFR BLD AUTO: 57.4 %
NRBC BLD AUTO-RTO: 0 %
PLATELET # BLD AUTO: 290 X10(3)/MCL (ref 130–400)
PMV BLD AUTO: 11.7 FL (ref 7.4–10.4)
POTASSIUM SERPL-SCNC: 4.1 MMOL/L (ref 3.5–5.1)
PROT SERPL-MCNC: 7 GM/DL (ref 6.4–8.3)
RBC # BLD AUTO: 4.82 X10(6)/MCL (ref 4.2–5.4)
SODIUM SERPL-SCNC: 141 MMOL/L (ref 136–145)
TIBC SERPL-MCNC: 234 UG/DL (ref 70–310)
TIBC SERPL-MCNC: 294 UG/DL (ref 250–450)
TRANSFERRIN SERPL-MCNC: 254 MG/DL (ref 180–382)
WBC # SPEC AUTO: 5.7 X10(3)/MCL (ref 4.5–11.5)

## 2023-02-22 PROCEDURE — 36415 COLL VENOUS BLD VENIPUNCTURE: CPT

## 2023-02-22 PROCEDURE — 83550 IRON BINDING TEST: CPT

## 2023-02-22 PROCEDURE — 82746 ASSAY OF FOLIC ACID SERUM: CPT

## 2023-02-22 PROCEDURE — 82728 ASSAY OF FERRITIN: CPT

## 2023-02-22 PROCEDURE — 80053 COMPREHEN METABOLIC PANEL: CPT

## 2023-02-22 PROCEDURE — 85025 COMPLETE CBC W/AUTO DIFF WBC: CPT

## 2023-02-23 ENCOUNTER — DOCUMENTATION ONLY (OUTPATIENT)
Dept: HEMATOLOGY/ONCOLOGY | Facility: CLINIC | Age: 48
End: 2023-02-23
Payer: MEDICAID

## 2023-02-23 NOTE — PROGRESS NOTES
Called and spoke with patient about repeat labs results. Hypokalemia resolved. She verbalized understanding. Educated to increase dietary potassium daily to supplement and stop oral potassium. She is amenable.

## 2023-02-27 RX ORDER — METOPROLOL TARTRATE 25 MG/1
25 TABLET, FILM COATED ORAL 2 TIMES DAILY
Qty: 60 TABLET | Refills: 1 | Status: SHIPPED | OUTPATIENT
Start: 2023-02-27 | End: 2023-03-15 | Stop reason: SDUPTHER

## 2023-03-01 ENCOUNTER — PATIENT MESSAGE (OUTPATIENT)
Dept: FAMILY MEDICINE | Facility: CLINIC | Age: 48
End: 2023-03-01
Payer: MEDICAID

## 2023-03-07 ENCOUNTER — OFFICE VISIT (OUTPATIENT)
Dept: OPHTHALMOLOGY | Facility: CLINIC | Age: 48
End: 2023-03-07
Payer: MEDICAID

## 2023-03-07 VITALS — WEIGHT: 161.81 LBS | BODY MASS INDEX: 30.55 KG/M2 | HEIGHT: 61 IN

## 2023-03-07 DIAGNOSIS — Q15.9 EYE ABNORMALITIES: ICD-10-CM

## 2023-03-07 DIAGNOSIS — H04.123 DRY EYE SYNDROME OF BOTH EYES: ICD-10-CM

## 2023-03-07 DIAGNOSIS — H40.013 AT LOW RISK FOR OPEN-ANGLE GLAUCOMA, BILATERAL: ICD-10-CM

## 2023-03-07 DIAGNOSIS — I69.30 HISTORY OF STROKE WITH RESIDUAL DEFICIT: Primary | ICD-10-CM

## 2023-03-07 PROCEDURE — 92083 EXTENDED VISUAL FIELD XM: CPT | Mod: PBBFAC,PO | Performed by: OPHTHALMOLOGY

## 2023-03-07 PROCEDURE — 92133 CPTRZD OPH DX IMG PST SGM ON: CPT | Mod: PBBFAC,PO | Performed by: OPHTHALMOLOGY

## 2023-03-07 PROCEDURE — 99213 OFFICE O/P EST LOW 20 MIN: CPT | Mod: PBBFAC,PO

## 2023-03-07 PROCEDURE — 92134 CPTRZ OPH DX IMG PST SGM RTA: CPT | Mod: PBBFAC,PO | Performed by: OPHTHALMOLOGY

## 2023-03-07 RX ORDER — PAROXETINE 10 MG/1
10 TABLET, FILM COATED ORAL
COMMUNITY
Start: 2023-02-27 | End: 2023-04-04

## 2023-03-07 RX ORDER — POTASSIUM CHLORIDE 1500 MG/1
20 TABLET, EXTENDED RELEASE ORAL
COMMUNITY
Start: 2023-02-16 | End: 2023-04-04

## 2023-03-07 NOTE — PROGRESS NOTES
HPI    Referred for glaucoma work up  Was dxed 6 years ago and has not used Latanoprost gtts in 6 months  Last edited by Daisha Orozco MA on 3/7/2023  2:24 PM.            Assessment /Plan     For exam results, see Encounter Report.    History of stroke with residual deficit    Eye abnormalities  -     Ambulatory referral/consult to Ophthalmology    Dry eye syndrome of both eyes      HVF 24-2 3/7/23:  OD: 0/11FL, 3%FP, 0%FN, INS, ST deficit   OS: 3/11FL, 0%FP, 0%FN, SN deficit   - Incomplete R Superior quadrantanopia and OD INS that does not correlate with OCT     OCT RNFL 3/7/23  OD: 106, all green   OS: 106, white S, rest green     OCT mac 3/7/23  OD: 253, good foveal contour, no SRF/IRF  OS: 249, good foveal contour, no SRF/IRF    1. History of Unspecified Glaucoma, OU   - Per pt, dx with glaucoma 6yrs ago following stroke as below and has been on latanoprost OU for 6yrs   - Large C/D, Paternal Aunt w/ glaucoma  - OCT RNFL 03/2023 full OU, no thinning   - HVF 03/2023 w/ incomplete R Superior quadrantanopia and INS OD that do not correlate with OCT/exam and more likely related to #2 below   - IOP 12//12 off gtts   - Low suspicion for glaucoma, ctm off gtts     2. Hx of CVA 2017  - 4.5 cm left parietal intraparenchymal hemorrhage w/ compression of the posterior horn of the left lateral ventricle  - VF deficits more likely related to CVA vs glaucoma as OCT RNFL full and nerves appear healthy on exam      3. MGUS (monoclonal gammopathy of unknown significance)   - IgG kappa monoclonal gammopathy (MGUS)  - Diagnosed 04/2018, follows w/ HemeOnc  - 6/16/2021: M spike 0.47g/dL; K/L ratio WNL. No urine M spike  - No retinopathy on DFE 3/7/23     4. JENNIFER, OU   - Start PFATs BID-TID, WCs, LS  - CTM     RTC 1yr for OCT RNFL, HVF 24-2, DFE or sooner PRN

## 2023-03-15 RX ORDER — LORATADINE 10 MG/1
10 TABLET ORAL DAILY
Qty: 30 TABLET | Refills: 2 | Status: SHIPPED | OUTPATIENT
Start: 2023-03-15 | End: 2023-05-25 | Stop reason: SDUPTHER

## 2023-03-15 RX ORDER — METOPROLOL TARTRATE 25 MG/1
25 TABLET, FILM COATED ORAL 2 TIMES DAILY
Qty: 60 TABLET | Refills: 1 | Status: SHIPPED | OUTPATIENT
Start: 2023-03-15 | End: 2023-05-15

## 2023-04-04 ENCOUNTER — OFFICE VISIT (OUTPATIENT)
Dept: FAMILY MEDICINE | Facility: CLINIC | Age: 48
End: 2023-04-04
Payer: MEDICAID

## 2023-04-04 VITALS
BODY MASS INDEX: 29.95 KG/M2 | HEART RATE: 62 BPM | DIASTOLIC BLOOD PRESSURE: 76 MMHG | TEMPERATURE: 98 F | SYSTOLIC BLOOD PRESSURE: 110 MMHG | OXYGEN SATURATION: 97 % | RESPIRATION RATE: 18 BRPM | WEIGHT: 158.63 LBS | HEIGHT: 61 IN

## 2023-04-04 DIAGNOSIS — D50.0 IRON DEFICIENCY ANEMIA SECONDARY TO BLOOD LOSS (CHRONIC): ICD-10-CM

## 2023-04-04 DIAGNOSIS — I10 HYPERTENSION, UNSPECIFIED TYPE: Primary | ICD-10-CM

## 2023-04-04 DIAGNOSIS — E04.1 THYROID NODULE: ICD-10-CM

## 2023-04-04 DIAGNOSIS — E78.5 HYPERLIPIDEMIA, UNSPECIFIED HYPERLIPIDEMIA TYPE: ICD-10-CM

## 2023-04-04 DIAGNOSIS — G56.00 CARPAL TUNNEL SYNDROME, UNSPECIFIED LATERALITY: ICD-10-CM

## 2023-04-04 PROCEDURE — 99215 OFFICE O/P EST HI 40 MIN: CPT | Mod: PBBFAC | Performed by: FAMILY MEDICINE

## 2023-04-04 RX ORDER — FLUOXETINE 10 MG/1
10 CAPSULE ORAL 2 TIMES DAILY
Qty: 60 CAPSULE | Refills: 2 | Status: SHIPPED | OUTPATIENT
Start: 2023-04-04 | End: 2023-04-04 | Stop reason: SDUPTHER

## 2023-04-04 RX ORDER — FLUOXETINE 10 MG/1
10 CAPSULE ORAL 2 TIMES DAILY
Qty: 60 CAPSULE | Refills: 2 | Status: SHIPPED | OUTPATIENT
Start: 2023-04-04 | End: 2023-05-19 | Stop reason: SDUPTHER

## 2023-04-04 NOTE — PROGRESS NOTES
Subjective     Patient ID: Pretty Pratt is a 48 y.o. female.    Chief Complaint: Follow-up, Anxiety (Pt stated no anxiety./Through out assessment pt c/o left arm coldness, decreased circulation, kept moving arm around stating she was worried due to past history of stroke. Pt stated this has been off and on for 1 wk. Now. No other symptoms stated. ), and Vitamin D Deficiency    HPI  Results from last visit discussed  VT D 200 -  GI upset, stopped it     Anxiety - doing better since change Paxil to Prozac, but still has  PMS s/s week before menses  HTN - controlled, soft, hesitant to reduce meds due to prev ICH, occ dizziness and light headed  HLD - currently off Zetia with improved GI s.s  GERD - added Protonix 1/2023 - much better  Vt D  - 2000 IU  = GI upset, will try 1000    RLS - gone since JAQUELINE resolved, meds on hold    LMP - now,  3/2023 - nl flow    Dizziness - no vertigo, NV, tinnitus, HA, hearing loss, vision changes, did take her meds this am, usually takes without eating, infrequent, once a week or less, short lived    Left arm tingling, feels cold, hand falls asleep at night, 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 No weakness, h/o challenging, inconsistent answers at times      Thyroid US - stable nodule - biopsied in past    Care Team  [facility] GYN, Oncology, ophth    HM  Mammo 1/2023  PAP 3/2017 NIL, HPV neg 7/2022 - insufficient  CRC: 2019  EGD,PCE 2019         Current Outpatient Medications   Medication Instructions    ferrous sulfate 325 mg, Oral, Daily    FLUoxetine 10 mg, Oral, Daily    fluticasone propionate (FLONASE) 50 mcg/actuation nasal spray 1 spray, Each Nostril, Daily    gabapentin (NEURONTIN) 300 mg, Oral, Nightly -rarely uses since RLS stopped    latanoprost 0.005 % ophthalmic solution 1 drop, Both Eyes, Nightly    loratadine (CLARITIN) 10 mg, Oral, Daily    metoprolol tartrate (LOPRESSOR) 25 mg, Oral, 2 times daily     "ondansetron (ZOFRAN) 4 mg, Oral, 2 times daily PRN    ondansetron (ZOFRAN-ODT) 4 mg, Oral, Every 6 hours PRN    pantoprazole (PROTONIX) 40 mg, Oral, Daily    pravastatin (PRAVACHOL) 20 mg, Oral    sucralfate (CARAFATE) 1 g, Oral, 2 times daily           ROS  ENT: no sore throat, ear pain,  nasal congestion/drainage  Respiratory: no cough, wheezing, SOB  Cardiovascular: no CP, palpitations, edema  Gastrointestinal: no NVD, ABD pain, blood in stool, melena    PE  Vitals:    04/04/23 0936   BP: 110/76   BP Location: Right arm   Patient Position: Sitting   BP Method: Medium (Automatic)   Pulse: 62   Resp: 18   Temp: 98.1 °F (36.7 °C)   TempSrc: Oral   SpO2: 97%   Weight: 71.9 kg (158 lb 9.6 oz)   Height: 5' 1" (1.549 m)     General - NAD, comfortable  Neck - no node, mass ,thyromegaly  Respiratory - CTA BL, no distress  Cardiovascular - RRR, no murmur, bruit  Neurologic - nl CN, nl gait and balance  4/5 hand  left - chronic per pt since stroke  Weakly + tinel's left wrist, + phalen's, neg compression, no atrophy  Sensation intact  LUE  C spine tend C4-7, left paracervical and trap tender spasm, nl reflexes      Assessment  1. Hypertension, unspecified type    2. Hyperlipidemia, unspecified hyperlipidemia type    3. Carpal tunnel syndrome, unspecified laterality    4.      Dizziness  5.      Left arm numbness, chronic      Plan  Prozac BID on PMS week and once daily on all other days  Take AM meds with food and monitor dizziness and BP 2 hours after taking meds daily  PM wrist splint, carpal tunnel exercises  MT for left trapezius spasm  ED for any change in s/s, declined ED eval today,  understands risks  Try Vt D 1000 IU daily (had the 2000)  Rtc 2-3 mos - labs, bring BP log, disc CRC screen      "

## 2023-05-05 DIAGNOSIS — Z86.2 HISTORY OF IRON DEFICIENCY ANEMIA: Primary | ICD-10-CM

## 2023-05-08 ENCOUNTER — TELEPHONE (OUTPATIENT)
Dept: HEMATOLOGY/ONCOLOGY | Facility: CLINIC | Age: 48
End: 2023-05-08
Payer: MEDICAID

## 2023-05-15 RX ORDER — METOPROLOL TARTRATE 25 MG/1
TABLET, FILM COATED ORAL
Qty: 60 TABLET | Refills: 1 | Status: SHIPPED | OUTPATIENT
Start: 2023-05-15 | End: 2023-06-06 | Stop reason: SDUPTHER

## 2023-05-19 ENCOUNTER — PATIENT MESSAGE (OUTPATIENT)
Dept: FAMILY MEDICINE | Facility: CLINIC | Age: 48
End: 2023-05-19
Payer: MEDICAID

## 2023-05-19 DIAGNOSIS — Z86.2 HISTORY OF IRON DEFICIENCY ANEMIA: Primary | ICD-10-CM

## 2023-05-19 RX ORDER — FLUOXETINE 10 MG/1
10 CAPSULE ORAL 2 TIMES DAILY
Qty: 60 CAPSULE | Refills: 2 | Status: SHIPPED | OUTPATIENT
Start: 2023-05-19 | End: 2023-08-14

## 2023-05-23 ENCOUNTER — OFFICE VISIT (OUTPATIENT)
Dept: HEMATOLOGY/ONCOLOGY | Facility: CLINIC | Age: 48
End: 2023-05-23
Payer: MEDICAID

## 2023-05-23 VITALS
TEMPERATURE: 98 F | DIASTOLIC BLOOD PRESSURE: 82 MMHG | WEIGHT: 161 LBS | OXYGEN SATURATION: 97 % | RESPIRATION RATE: 20 BRPM | SYSTOLIC BLOOD PRESSURE: 124 MMHG | BODY MASS INDEX: 30.4 KG/M2 | HEIGHT: 61 IN | HEART RATE: 68 BPM

## 2023-05-23 DIAGNOSIS — Z86.2 HISTORY OF IRON DEFICIENCY ANEMIA: Primary | ICD-10-CM

## 2023-05-23 LAB — DEPRECATED CALCIDIOL+CALCIFEROL SERPL-MC: 19.1 NG/ML (ref 30–80)

## 2023-05-23 PROCEDURE — 36415 COLL VENOUS BLD VENIPUNCTURE: CPT | Performed by: NURSE PRACTITIONER

## 2023-05-23 PROCEDURE — 3008F PR BODY MASS INDEX (BMI) DOCUMENTED: ICD-10-PCS | Mod: CPTII,,, | Performed by: NURSE PRACTITIONER

## 2023-05-23 PROCEDURE — 99214 OFFICE O/P EST MOD 30 MIN: CPT | Mod: S$PBB,,, | Performed by: NURSE PRACTITIONER

## 2023-05-23 PROCEDURE — 1160F RVW MEDS BY RX/DR IN RCRD: CPT | Mod: CPTII,,, | Performed by: NURSE PRACTITIONER

## 2023-05-23 PROCEDURE — 3079F DIAST BP 80-89 MM HG: CPT | Mod: CPTII,,, | Performed by: NURSE PRACTITIONER

## 2023-05-23 PROCEDURE — 1159F MED LIST DOCD IN RCRD: CPT | Mod: CPTII,,, | Performed by: NURSE PRACTITIONER

## 2023-05-23 PROCEDURE — 3079F PR MOST RECENT DIASTOLIC BLOOD PRESSURE 80-89 MM HG: ICD-10-PCS | Mod: CPTII,,, | Performed by: NURSE PRACTITIONER

## 2023-05-23 PROCEDURE — 3074F SYST BP LT 130 MM HG: CPT | Mod: CPTII,,, | Performed by: NURSE PRACTITIONER

## 2023-05-23 PROCEDURE — 99214 PR OFFICE/OUTPT VISIT, EST, LEVL IV, 30-39 MIN: ICD-10-PCS | Mod: S$PBB,,, | Performed by: NURSE PRACTITIONER

## 2023-05-23 PROCEDURE — 1160F PR REVIEW ALL MEDS BY PRESCRIBER/CLIN PHARMACIST DOCUMENTED: ICD-10-PCS | Mod: CPTII,,, | Performed by: NURSE PRACTITIONER

## 2023-05-23 PROCEDURE — 1159F PR MEDICATION LIST DOCUMENTED IN MEDICAL RECORD: ICD-10-PCS | Mod: CPTII,,, | Performed by: NURSE PRACTITIONER

## 2023-05-23 PROCEDURE — 82306 VITAMIN D 25 HYDROXY: CPT | Performed by: NURSE PRACTITIONER

## 2023-05-23 PROCEDURE — 99214 OFFICE O/P EST MOD 30 MIN: CPT | Mod: PBBFAC | Performed by: NURSE PRACTITIONER

## 2023-05-23 PROCEDURE — 3008F BODY MASS INDEX DOCD: CPT | Mod: CPTII,,, | Performed by: NURSE PRACTITIONER

## 2023-05-23 PROCEDURE — 3074F PR MOST RECENT SYSTOLIC BLOOD PRESSURE < 130 MM HG: ICD-10-PCS | Mod: CPTII,,, | Performed by: NURSE PRACTITIONER

## 2023-05-23 RX ORDER — SODIUM CHLORIDE 0.9 % (FLUSH) 0.9 %
10 SYRINGE (ML) INJECTION
Status: CANCELLED | OUTPATIENT
Start: 2023-05-24

## 2023-05-23 RX ORDER — METHYLPREDNISOLONE SOD SUCC 125 MG
125 VIAL (EA) INJECTION ONCE AS NEEDED
Status: CANCELLED | OUTPATIENT
Start: 2023-05-24

## 2023-05-23 RX ORDER — HEPARIN 100 UNIT/ML
500 SYRINGE INTRAVENOUS
Status: CANCELLED | OUTPATIENT
Start: 2023-05-24

## 2023-05-23 RX ORDER — EZETIMIBE 10 MG/1
10 TABLET ORAL
COMMUNITY
Start: 2023-04-18 | End: 2023-06-06

## 2023-05-23 RX ORDER — DIPHENHYDRAMINE HYDROCHLORIDE 50 MG/ML
50 INJECTION INTRAMUSCULAR; INTRAVENOUS ONCE AS NEEDED
Status: CANCELLED | OUTPATIENT
Start: 2023-05-24

## 2023-05-23 RX ORDER — EPINEPHRINE 0.3 MG/.3ML
0.3 INJECTION SUBCUTANEOUS ONCE AS NEEDED
Status: CANCELLED | OUTPATIENT
Start: 2023-05-24

## 2023-05-23 NOTE — Clinical Note
Schedule with infusion for x2 doses of Feraheme  Repeat lab work 6 weeks after completion of Feraheme Fu w/np in 3 months with lab work via TM, lab work to be completed prior to telemed appointment

## 2023-05-23 NOTE — PROGRESS NOTES
Chief Complaint   Iron deficiency anemia      Problem list:  1. History of microcytic anemia with suggestion of iron deficiency on partial iron studies  2. Monoclonal gammopathy    Current Treatment:  Feraheme prn    Treatment History:  Feraheme x2 (2019)  Feraheme 510 mg IV x2 (06/15/2020, 2020)  Feraheme 510mg IV x2 (2020 & 2020)  Feraheme 510mg IV x2 (2022  & 2022)      Past medical history: Anemia. Anxiety. Allergic rhinitis. Benign cyst of breast. Cataracts. Constipation. Depression. Exercise intolerance. GERD. History of intracranial hemorrhage. Memory loss. Headaches. Hemorrhoids. Hypertension. Insomnia. Peptic ulcer disease. 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 Peptic ulcer. Bilateral mammoplasty, augmentation, silicone implants, and mastopexy 2017.  x2. Cholecystectomy.  Social history: Single. Lives in [place]. Has 2 children. Currently, unemployed. Used to work for the [employer]. Denies history of tobacco, alcohol, or illicit drug abuse.  Family history: Father experienced pancreatic cancer at age 56; used to smoke. Father is a sister experienced colon cancer in her 30s.  Health maintenance: 10/25/2018: Screening mammogram: No evidence of malignancy.  Menstrual history: Has menstrual cycles once or twice a month; does relate history of blood clots with menstrual flow.    History of present illness:  For details, please see Dr. Leach's last note dated 10/08/2020. Please also refer to assessment and plan section.    Interval History 2023: Patient presents today alone for scheduled follow up for JAQUELINE. She is currently on oral iron supplements of which she reports compliance. Her last iron infusion was in 2022. Patients' levels have been stable since. Patient says she no longer takes Provera. Her MD took her off due to her history of stroke and new  symptoms of headache and elevated blood pressure. She also inquired about if she should continue iron supplements because she felt like it was making her gain weight. Reviewed potential side effects with patient in which appetite stimulant is not among those. Patient also says that she occasionally has experiences palpitations. Discussed with patient that palpitations can occur with anemia. If symptoms worsen report to ER for further workup. Lab work reviewed with patient, H&H 11.4, 35.7 stable,  iron level has dropped to 20, ferritin level 8. Discussed plan of care with patient. She is amenable.     Review of Systems: A complete 12-point ROS was performed in full with pertinent positives as described in interval history. Remainder of ROS done in full and are negative.       Lab Results   Component Value Date    WBC 5.46 05/23/2023    RBC 4.14 (L) 05/23/2023    HGB 11.4 (L) 05/23/2023    HCT 35.7 (L) 05/23/2023    MCV 86.2 05/23/2023    MCH 27.5 05/23/2023    MCHC 31.9 (L) 05/23/2023    RDW 12.2 05/23/2023     05/23/2023    MPV 11.4 (H) 05/23/2023     CMP  Sodium Level   Date Value Ref Range Status   05/23/2023 140 136 - 145 mmol/L Final     Potassium Level   Date Value Ref Range Status   05/23/2023 3.6 3.5 - 5.1 mmol/L Final     Carbon Dioxide   Date Value Ref Range Status   05/23/2023 24 22 - 29 mmol/L Final     Blood Urea Nitrogen   Date Value Ref Range Status   05/23/2023 10.3 7.0 - 18.7 mg/dL Final     Creatinine   Date Value Ref Range Status   05/23/2023 0.74 0.55 - 1.02 mg/dL Final     Calcium Level Total   Date Value Ref Range Status   05/23/2023 8.5 8.4 - 10.2 mg/dL Final     Albumin Level   Date Value Ref Range Status   05/23/2023 3.6 3.5 - 5.0 g/dL Final     Bilirubin Total   Date Value Ref Range Status   05/23/2023 0.3 <=1.5 mg/dL Final     Alkaline Phosphatase   Date Value Ref Range Status   05/23/2023 60 40 - 150 unit/L Final     Aspartate Aminotransferase   Date Value Ref Range Status    05/23/2023 18 5 - 34 unit/L Final     Alanine Aminotransferase   Date Value Ref Range Status   05/23/2023 11 0 - 55 unit/L Final     eGFR   Date Value Ref Range Status   05/23/2023 >60 mls/min/1.73/m2 Final       Physical Exam       Vitals:     12/06/22 1515   BP: 119/80   Pulse: 68   Resp: 20   Temp: 97.7 °F (36.5 °C)      General: Alert and oriented. No acute distress; + fatigue   Eye: Pupils are equal, round and reactive to light, Extraocular movements are intact. Normal conjunctiva  HENT: Normocephalic. Oropharynx exam deferred; mask in place due to coronavirus  Neck: Supple, Non-tender  Respiratory: Respirations are non-labored, Symmetrical chest wall expansion. Breath sounds CTA bilaterally  Cardiovascular: Regular rate, rhythm, Normal peripheral perfusion, No bilateral lower extremity edema  Breast: Exam deferred  Gastrointestinal: Non-distended, Present bowel sounds   GYN: Exam deferred  Genitourinary: Exam deferred  Lymphatics: No lymphadenopathy appreciated  Musculoskeletal: Moves all extremities  Integumentary: Intact. Warm, dry. No rashes, or lesions to visible skin  Neurologic: No focal deficits  Psychiatric: Cooperative. Appropriate mood and affect      Assessment / Plan   1. Iron deficiency anemia   -Severe iron deficiency anemia  -Iron pills caused constipation which caused bleeding hemorrhoids (2018)  -No response to oral iron therapy  -No GI source of bleeding (EGD and colonoscopy: 05/2019)  -Feraheme x2 (06/2019), with good response  -Deficiency recurred 11/2019 but hemoglobin normal  -06/05/2020: Ferritin 3.7, dropped; hemoglobin 9.5 (down from 14.4 on 11/14/2090)  -Feraheme 510 mg IV x2 (06/15/2020, 06/22/2020)  -Iron deficiency and anemia dramatically resolved (07/20/2020)  -Capsule endoscopy (08/20/2020): Negative  -10/07/2020: Remains iron deficient but hemoglobin is normal (transferrin saturation 10.2%, ferritin 10.1, hemoglobin 14)  (Ferritin was 117 on 07/20/2020)  -12/07/2020: Remains  iron deficient but hemoglobin remains normal (serum iron 24; TIBC 359; transferrin saturation 7%; ferritin 11.9, was 10.1 on 10/07/2020, 117 on 07/20/2020; hemoglobin 13.4)  Feraheme 510mg IV x2 (12/16/2022, 12/28/2022)     2. MGUS (monoclonal gammopathy of unknown significance)   -IgG kappa monoclonal gammopathy (MGUS)  -Diagnosed 04/2018  -Stable as of 11/2019  -05/18/2020, 06/05/2020: Stable  -06/05/2020: 0.3 g/dL IgG kappa monoclonal spike, stable since 11/2019   Patient has low risk IgG kappa monoclonal gammopathy (MGUS).  Repeat paraproteinemia labs in 1 year (05/2021).  -6/16/2021: M spike 0.47g/dL; K/L ratio WNL. No urine M spike.    # History of noncompliance with follow-up appointments with various specialties    # Thyroid mass/nodule, in need of biopsy (ultrasound 03/12/2019)  Ultrasound (03/12/2019): 3 cm solid nodule lower right thyroid  -Thyroid nodule biopsy scheduled for 01/13/2020  -01/13/2020: Right thyroid nodule, ultrasound-guided FNA: Benign follicular nodule with cystic degeneration    # Health maintenance:  -12/05/2019: Screening mammogram: No evidence of malignancy; BI-RADS 2: Benign  -Screening mammogram in 12/16/2020 - no mammographic concerns for malignancy     Plan:  -Status post Feraheme x2 (06/2020), with dramatic resolution of iron deficiency and anemia  -No GI source of bleeding (EGD, colonoscopy, capsule endoscopy)  -10/07/2020: Hemoglobin normal but iron deficiency has recurred since July 2020  -12/07/2020: Hemoglobin remains normal but iron deficiency persists  -Failed oral iron therapy in the past      Iron level 20, ferritin 8; therefore will give x2 doses of Feraheme    Repeat lab work in 6 weeks post completion of iron infusion    Follow up with GYN as scheduled    Annual MMG due 1/2024    RTC for labs, NP visit in 3 months with lab work (cbc,cmp, iron profile, ferritin level, Vitamin D)          ADDENDUM: on 8/15/2023 patient came into clinic asking to receive IV iron. States  her menses has been ongoing for a month. Feeling tired and weak. CBC and iron panel w/ Ferritin checked. Stable values, not suggestive of need for IV iron.   Spoke with patient and advised her to see gynecologist to discuss heavy menses and possible treatment options.  Encouraged her to maintain hydration with water with recent hot temperatures. She agrees. She has a follow up appointment with NP here on 8/24/2023.

## 2023-05-24 ENCOUNTER — TELEPHONE (OUTPATIENT)
Dept: HEMATOLOGY/ONCOLOGY | Facility: CLINIC | Age: 48
End: 2023-05-24
Payer: MEDICAID

## 2023-05-24 NOTE — TELEPHONE ENCOUNTER
----- Message from JUAN Cerrato sent at 5/24/2023  8:13 AM CDT -----  Start Vitamin D  IU daily for low Vitamin D level

## 2023-05-25 RX ORDER — LORATADINE 10 MG/1
10 TABLET ORAL DAILY
Qty: 30 TABLET | Refills: 5 | Status: SHIPPED | OUTPATIENT
Start: 2023-05-25 | End: 2023-10-05 | Stop reason: SDUPTHER

## 2023-06-02 ENCOUNTER — INFUSION (OUTPATIENT)
Dept: INFUSION THERAPY | Facility: HOSPITAL | Age: 48
End: 2023-06-02
Attending: INTERNAL MEDICINE
Payer: MEDICAID

## 2023-06-02 VITALS
SYSTOLIC BLOOD PRESSURE: 122 MMHG | DIASTOLIC BLOOD PRESSURE: 82 MMHG | RESPIRATION RATE: 16 BRPM | TEMPERATURE: 98 F | HEART RATE: 71 BPM | WEIGHT: 161 LBS | HEIGHT: 60 IN | OXYGEN SATURATION: 95 % | BODY MASS INDEX: 31.61 KG/M2

## 2023-06-02 DIAGNOSIS — Z86.2 HISTORY OF IRON DEFICIENCY ANEMIA: Primary | ICD-10-CM

## 2023-06-02 PROCEDURE — 96365 THER/PROPH/DIAG IV INF INIT: CPT

## 2023-06-02 PROCEDURE — 25000003 PHARM REV CODE 250: Performed by: NURSE PRACTITIONER

## 2023-06-02 PROCEDURE — 63600175 PHARM REV CODE 636 W HCPCS: Mod: JZ,JG | Performed by: NURSE PRACTITIONER

## 2023-06-02 PROCEDURE — A4216 STERILE WATER/SALINE, 10 ML: HCPCS | Performed by: NURSE PRACTITIONER

## 2023-06-02 RX ORDER — SODIUM CHLORIDE 0.9 % (FLUSH) 0.9 %
10 SYRINGE (ML) INJECTION
Status: DISCONTINUED | OUTPATIENT
Start: 2023-06-02 | End: 2023-06-02 | Stop reason: HOSPADM

## 2023-06-02 RX ORDER — SODIUM CHLORIDE 0.9 % (FLUSH) 0.9 %
10 SYRINGE (ML) INJECTION
Status: CANCELLED | OUTPATIENT
Start: 2023-06-02

## 2023-06-02 RX ORDER — DIPHENHYDRAMINE HYDROCHLORIDE 50 MG/ML
50 INJECTION INTRAMUSCULAR; INTRAVENOUS ONCE AS NEEDED
Status: CANCELLED | OUTPATIENT
Start: 2023-06-02

## 2023-06-02 RX ORDER — EPINEPHRINE 0.3 MG/.3ML
0.3 INJECTION SUBCUTANEOUS ONCE AS NEEDED
Status: CANCELLED | OUTPATIENT
Start: 2023-06-02

## 2023-06-02 RX ORDER — HEPARIN 100 UNIT/ML
500 SYRINGE INTRAVENOUS
Status: CANCELLED | OUTPATIENT
Start: 2023-06-02

## 2023-06-02 RX ORDER — METHYLPREDNISOLONE SOD SUCC 125 MG
125 VIAL (EA) INJECTION ONCE AS NEEDED
Status: CANCELLED | OUTPATIENT
Start: 2023-06-02

## 2023-06-02 RX ADMIN — FERUMOXYTOL 510 MG: 510 INJECTION INTRAVENOUS at 10:06

## 2023-06-02 RX ADMIN — Medication 10 ML: at 10:06

## 2023-06-02 NOTE — NURSING
0901  Pt arrived for #1/2 [facility].  Pt reports feeling tired, weak with SOB when walking.  Denies any pain.

## 2023-06-02 NOTE — LETTER
June 2, 2023      [facility]  [address] [facility] [address]  Phone: [phone]  Fax: [phone]       Patient: Pretty Pratt   YOB: 1975  Date of Visit: 06/02/2023    To Whom It May Concern:    ZOHRA Pratt  was at [facility] on 06/02/2023. The patient may return to work/school on Geno 3,2023 with no restrictions. If you have any questions or concerns, or if I can be of further assistance, please do not hesitate to contact me.    Sincerely,    Noelle Hutson RN

## 2023-06-06 ENCOUNTER — OFFICE VISIT (OUTPATIENT)
Dept: FAMILY MEDICINE | Facility: CLINIC | Age: 48
End: 2023-06-06
Payer: MEDICAID

## 2023-06-06 VITALS
HEART RATE: 74 BPM | WEIGHT: 163 LBS | HEIGHT: 62 IN | DIASTOLIC BLOOD PRESSURE: 75 MMHG | TEMPERATURE: 98 F | RESPIRATION RATE: 18 BRPM | SYSTOLIC BLOOD PRESSURE: 110 MMHG | BODY MASS INDEX: 30 KG/M2 | OXYGEN SATURATION: 99 %

## 2023-06-06 DIAGNOSIS — F41.9 ANXIETY: ICD-10-CM

## 2023-06-06 DIAGNOSIS — E78.5 HYPERLIPIDEMIA, UNSPECIFIED HYPERLIPIDEMIA TYPE: ICD-10-CM

## 2023-06-06 DIAGNOSIS — R06.83 SNORING: ICD-10-CM

## 2023-06-06 DIAGNOSIS — R53.83 FATIGUE, UNSPECIFIED TYPE: ICD-10-CM

## 2023-06-06 DIAGNOSIS — I10 HYPERTENSION, UNSPECIFIED TYPE: Primary | ICD-10-CM

## 2023-06-06 PROCEDURE — 99214 OFFICE O/P EST MOD 30 MIN: CPT | Mod: PBBFAC | Performed by: FAMILY MEDICINE

## 2023-06-06 RX ORDER — ERGOCALCIFEROL 1.25 MG/1
50000 CAPSULE ORAL
Qty: 12 CAPSULE | Refills: 0 | Status: SHIPPED | OUTPATIENT
Start: 2023-06-06 | End: 2023-09-09

## 2023-06-06 RX ORDER — PANTOPRAZOLE SODIUM 40 MG/1
40 TABLET, DELAYED RELEASE ORAL DAILY
Qty: 30 TABLET | Refills: 5 | Status: SHIPPED | OUTPATIENT
Start: 2023-06-06 | End: 2023-10-05 | Stop reason: SDUPTHER

## 2023-06-06 RX ORDER — FERROUS SULFATE 325(65) MG
325 TABLET, DELAYED RELEASE (ENTERIC COATED) ORAL DAILY
Qty: 30 TABLET | Refills: 5 | Status: SHIPPED | OUTPATIENT
Start: 2023-06-06 | End: 2023-08-15 | Stop reason: SDUPTHER

## 2023-06-06 RX ORDER — METOPROLOL TARTRATE 25 MG/1
25 TABLET, FILM COATED ORAL 2 TIMES DAILY
Qty: 60 TABLET | Refills: 5 | Status: SHIPPED | OUTPATIENT
Start: 2023-06-06 | End: 2023-12-08

## 2023-06-06 NOTE — PROGRESS NOTES
Subjective     Patient ID: Pretty Pratt is a 48 y.o. female.    Chief Complaint: Follow-up, Hyperlipidemia, Hypertension, and Snoring    HPI    Results from last visit discussed  VT D 2000 -  GI upset, stopped it      Anxiety - doing better since change Paxil to Prozac, but still has  PMS s/s week before menses  HTN - controlled, soft, hesitant to reduce meds due to prev ICH, occ dizziness and light headed  HLD - currently off Zetia with improved GI s.s  GERD - added Protonix 1/2023 - much better  Vt D  - 2000 IU  = GI upset, will try 1000     RLS - gone since JAQUELINE resolved, meds on hold     LMP - now,  3/2023 - nl flow     Dizziness - no vertigo, NV, tinnitus, HA, hearing loss, vision changes, did take her meds this am, usually takes without eating, infrequent, once a week or less, short lived     Left arm tingling, feels cold, hand falls asleep at night, 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 No weakness, h/o challenging, inconsistent answers at times        Thyroid US - stable nodule - biopsied in past     Care Team  [facility] GYN, Oncology, ophth     HM  Mammo 1/2023  PAP 3/2017 NIL, HPV neg 7/2022 - insufficient  CRC: 2019  EGD,PCE 2019        Current Outpatient Medications   Medication Instructions    ergocalciferol (ERGOCALCIFEROL) 50,000 Units, Oral, Every 7 days    ferrous sulfate 325 mg, Oral, Daily    FLUoxetine 10 mg, Oral, 2 times daily    fluticasone propionate (FLONASE) 50 mcg/actuation nasal spray 1 spray, Each Nostril, Daily    latanoprost 0.005 % ophthalmic solution 1 drop, Both Eyes, Nightly    loratadine (CLARITIN) 10 mg, Oral, Daily    metoprolol tartrate (LOPRESSOR) 25 mg, Oral, 2 times daily    ondansetron (ZOFRAN) 4 mg, Oral, 2 times daily PRN    pantoprazole (PROTONIX) 40 mg, Oral, Daily    sucralfate (CARAFATE) 1 g, Oral, 2 times daily         ROS    Respiratory: no cough, wheezing, SOB  Cardiovascular: no CP, palpitations,  "edema  Gastrointestinal: no NVD, ABD pain, blood in stool, melena      PE  Vitals:    06/06/23 1123   BP: 110/75   BP Location: Right arm   Patient Position: Sitting   BP Method: Medium (Automatic)   Pulse: 74   Resp: 18   Temp: 98.1 °F (36.7 °C)   TempSrc: Oral   SpO2: 99%   Weight: 73.9 kg (163 lb)   Height: 5' 2" (1.575 m)     General - NAD, comfortable, looks great, smiling  Neck - no node, mass ,thyromegaly  Respiratory - CTA BL, no distress  Cardiovascular - RRR, no murmur  Gastrointestinal - soft NT, no mass, nl BS x 4     Assessment  1. Hypertension, unspecified type    2. Hyperlipidemia, unspecified hyperlipidemia type    3. Anxiety    4. Snoring    5. Fatigue, unspecified type            Plan  Continue current meds  Ck lipid  Sleep study when pt ready  RTC 3 mos    Orders Placed This Encounter    Lipid Panel    ferrous sulfate 325 (65 FE) MG EC tablet    metoprolol tartrate (LOPRESSOR) 25 MG tablet    pantoprazole (PROTONIX) 40 MG tablet    ergocalciferol (ERGOCALCIFEROL) 50,000 unit Cap           "

## 2023-06-09 ENCOUNTER — INFUSION (OUTPATIENT)
Dept: INFUSION THERAPY | Facility: HOSPITAL | Age: 48
End: 2023-06-09
Attending: INTERNAL MEDICINE
Payer: MEDICAID

## 2023-06-09 VITALS
SYSTOLIC BLOOD PRESSURE: 117 MMHG | WEIGHT: 161.81 LBS | HEIGHT: 62 IN | OXYGEN SATURATION: 100 % | TEMPERATURE: 98 F | DIASTOLIC BLOOD PRESSURE: 78 MMHG | RESPIRATION RATE: 18 BRPM | HEART RATE: 66 BPM | BODY MASS INDEX: 29.78 KG/M2

## 2023-06-09 DIAGNOSIS — Z86.2 HISTORY OF IRON DEFICIENCY ANEMIA: Primary | ICD-10-CM

## 2023-06-09 PROCEDURE — 25000003 PHARM REV CODE 250: Performed by: NURSE PRACTITIONER

## 2023-06-09 PROCEDURE — 63600175 PHARM REV CODE 636 W HCPCS: Mod: JZ,JG | Performed by: NURSE PRACTITIONER

## 2023-06-09 PROCEDURE — 96365 THER/PROPH/DIAG IV INF INIT: CPT

## 2023-06-09 RX ORDER — SODIUM CHLORIDE 0.9 % (FLUSH) 0.9 %
10 SYRINGE (ML) INJECTION
Status: DISCONTINUED | OUTPATIENT
Start: 2023-06-09 | End: 2023-06-09 | Stop reason: HOSPADM

## 2023-06-09 RX ORDER — METHYLPREDNISOLONE SOD SUCC 125 MG
125 VIAL (EA) INJECTION ONCE AS NEEDED
Status: DISCONTINUED | OUTPATIENT
Start: 2023-06-09 | End: 2023-06-09 | Stop reason: HOSPADM

## 2023-06-09 RX ORDER — DIPHENHYDRAMINE HYDROCHLORIDE 50 MG/ML
50 INJECTION INTRAMUSCULAR; INTRAVENOUS ONCE AS NEEDED
Status: CANCELLED | OUTPATIENT
Start: 2023-06-09

## 2023-06-09 RX ORDER — HEPARIN 100 UNIT/ML
500 SYRINGE INTRAVENOUS
Status: CANCELLED | OUTPATIENT
Start: 2023-06-09

## 2023-06-09 RX ORDER — METHYLPREDNISOLONE SOD SUCC 125 MG
125 VIAL (EA) INJECTION ONCE AS NEEDED
Status: CANCELLED | OUTPATIENT
Start: 2023-06-09

## 2023-06-09 RX ORDER — EPINEPHRINE 0.3 MG/.3ML
0.3 INJECTION SUBCUTANEOUS ONCE AS NEEDED
Status: CANCELLED | OUTPATIENT
Start: 2023-06-09

## 2023-06-09 RX ORDER — DIPHENHYDRAMINE HYDROCHLORIDE 50 MG/ML
50 INJECTION INTRAMUSCULAR; INTRAVENOUS ONCE AS NEEDED
Status: DISCONTINUED | OUTPATIENT
Start: 2023-06-09 | End: 2023-06-09 | Stop reason: HOSPADM

## 2023-06-09 RX ORDER — SODIUM CHLORIDE 0.9 % (FLUSH) 0.9 %
10 SYRINGE (ML) INJECTION
Status: CANCELLED | OUTPATIENT
Start: 2023-06-09

## 2023-06-09 RX ORDER — EPINEPHRINE 0.3 MG/.3ML
0.3 INJECTION SUBCUTANEOUS ONCE AS NEEDED
Status: DISCONTINUED | OUTPATIENT
Start: 2023-06-09 | End: 2023-06-09 | Stop reason: HOSPADM

## 2023-06-09 RX ADMIN — FERUMOXYTOL 510 MG: 510 INJECTION INTRAVENOUS at 10:06

## 2023-06-09 RX ADMIN — SODIUM CHLORIDE: 9 INJECTION, SOLUTION INTRAVENOUS at 09:06

## 2023-06-21 ENCOUNTER — PATIENT MESSAGE (OUTPATIENT)
Dept: ADMINISTRATIVE | Facility: HOSPITAL | Age: 48
End: 2023-06-21
Payer: MEDICAID

## 2023-07-17 ENCOUNTER — TELEPHONE (OUTPATIENT)
Dept: FAMILY MEDICINE | Facility: CLINIC | Age: 48
End: 2023-07-17
Payer: MEDICAID

## 2023-08-14 RX ORDER — FLUOXETINE 10 MG/1
CAPSULE ORAL
Qty: 60 CAPSULE | Refills: 2 | Status: SHIPPED | OUTPATIENT
Start: 2023-08-14 | End: 2023-10-05 | Stop reason: SDUPTHER

## 2023-08-15 ENCOUNTER — CLINICAL SUPPORT (OUTPATIENT)
Dept: HEMATOLOGY/ONCOLOGY | Facility: CLINIC | Age: 48
End: 2023-08-15
Payer: MEDICAID

## 2023-08-15 DIAGNOSIS — E55.9 VITAMIN D DEFICIENCY: ICD-10-CM

## 2023-08-15 DIAGNOSIS — D50.0 IRON DEFICIENCY ANEMIA SECONDARY TO BLOOD LOSS (CHRONIC): ICD-10-CM

## 2023-08-15 DIAGNOSIS — D47.2 MONOCLONAL GAMMOPATHY OF UNKNOWN SIGNIFICANCE (MGUS): ICD-10-CM

## 2023-08-15 DIAGNOSIS — Z86.2 HISTORY OF IRON DEFICIENCY ANEMIA: ICD-10-CM

## 2023-08-15 DIAGNOSIS — D50.0 IRON DEFICIENCY ANEMIA SECONDARY TO BLOOD LOSS (CHRONIC): Primary | ICD-10-CM

## 2023-08-15 LAB
BASOPHILS # BLD AUTO: 0.04 X10(3)/MCL
BASOPHILS NFR BLD AUTO: 0.6 %
EOSINOPHIL # BLD AUTO: 0.13 X10(3)/MCL (ref 0–0.9)
EOSINOPHIL NFR BLD AUTO: 1.9 %
ERYTHROCYTE [DISTWIDTH] IN BLOOD BY AUTOMATED COUNT: 15.8 % (ref 11.5–17)
FERRITIN SERPL-MCNC: 70.05 NG/ML (ref 4.63–204)
HCT VFR BLD AUTO: 38.5 % (ref 37–47)
HGB BLD-MCNC: 12.8 G/DL (ref 12–16)
IMM GRANULOCYTES # BLD AUTO: 0.02 X10(3)/MCL (ref 0–0.04)
IMM GRANULOCYTES NFR BLD AUTO: 0.3 %
IRON SATN MFR SERPL: 26 % (ref 20–50)
IRON SERPL-MCNC: 70 UG/DL (ref 50–170)
LYMPHOCYTES # BLD AUTO: 1.59 X10(3)/MCL (ref 0.6–4.6)
LYMPHOCYTES NFR BLD AUTO: 23.6 %
MCH RBC QN AUTO: 29.6 PG (ref 27–31)
MCHC RBC AUTO-ENTMCNC: 33.2 G/DL (ref 33–36)
MCV RBC AUTO: 89.1 FL (ref 80–94)
MONOCYTES # BLD AUTO: 0.51 X10(3)/MCL (ref 0.1–1.3)
MONOCYTES NFR BLD AUTO: 7.6 %
NEUTROPHILS # BLD AUTO: 4.46 X10(3)/MCL (ref 2.1–9.2)
NEUTROPHILS NFR BLD AUTO: 66 %
NRBC BLD AUTO-RTO: 0 %
PLATELET # BLD AUTO: 287 X10(3)/MCL (ref 130–400)
PMV BLD AUTO: 10.9 FL (ref 7.4–10.4)
RBC # BLD AUTO: 4.32 X10(6)/MCL (ref 4.2–5.4)
TIBC SERPL-MCNC: 198 UG/DL (ref 70–310)
TIBC SERPL-MCNC: 268 UG/DL (ref 250–450)
TRANSFERRIN SERPL-MCNC: 220 MG/DL (ref 180–382)
WBC # SPEC AUTO: 6.75 X10(3)/MCL (ref 4.5–11.5)

## 2023-08-15 PROCEDURE — 83550 IRON BINDING TEST: CPT

## 2023-08-15 PROCEDURE — 85025 COMPLETE CBC W/AUTO DIFF WBC: CPT

## 2023-08-15 PROCEDURE — 36415 COLL VENOUS BLD VENIPUNCTURE: CPT

## 2023-08-15 PROCEDURE — 82728 ASSAY OF FERRITIN: CPT

## 2023-08-24 ENCOUNTER — TELEPHONE (OUTPATIENT)
Dept: FAMILY MEDICINE | Facility: CLINIC | Age: 48
End: 2023-08-24
Payer: MEDICAID

## 2023-08-24 NOTE — TELEPHONE ENCOUNTER
Dr Cruz, the patient called and left a message stating that she was in the ER last night for vaginal bleeding x1 month.  She wants a referral for a GYN.

## 2023-08-28 DIAGNOSIS — D50.0 IRON DEFICIENCY ANEMIA SECONDARY TO BLOOD LOSS (CHRONIC): Primary | ICD-10-CM

## 2023-08-29 ENCOUNTER — APPOINTMENT (OUTPATIENT)
Dept: HEMATOLOGY/ONCOLOGY | Facility: CLINIC | Age: 48
End: 2023-08-29
Payer: MEDICAID

## 2023-08-29 DIAGNOSIS — Z86.2 HISTORY OF IRON DEFICIENCY ANEMIA: ICD-10-CM

## 2023-08-29 DIAGNOSIS — D50.0 IRON DEFICIENCY ANEMIA SECONDARY TO BLOOD LOSS (CHRONIC): ICD-10-CM

## 2023-08-29 LAB
ALBUMIN SERPL-MCNC: 3.7 G/DL (ref 3.5–5)
ALBUMIN/GLOB SERPL: 1.2 RATIO (ref 1.1–2)
ALP SERPL-CCNC: 54 UNIT/L (ref 40–150)
ALT SERPL-CCNC: 20 UNIT/L (ref 0–55)
AST SERPL-CCNC: 24 UNIT/L (ref 5–34)
BASOPHILS # BLD AUTO: 0.04 X10(3)/MCL
BASOPHILS NFR BLD AUTO: 0.8 %
BILIRUB SERPL-MCNC: 0.4 MG/DL
BUN SERPL-MCNC: 10 MG/DL (ref 7–18.7)
CALCIUM SERPL-MCNC: 8.8 MG/DL (ref 8.4–10.2)
CHLORIDE SERPL-SCNC: 111 MMOL/L (ref 98–107)
CO2 SERPL-SCNC: 24 MMOL/L (ref 22–29)
CREAT SERPL-MCNC: 0.66 MG/DL (ref 0.55–1.02)
DEPRECATED CALCIDIOL+CALCIFEROL SERPL-MC: 36.5 NG/ML (ref 30–80)
EOSINOPHIL # BLD AUTO: 0.15 X10(3)/MCL (ref 0–0.9)
EOSINOPHIL NFR BLD AUTO: 2.9 %
ERYTHROCYTE [DISTWIDTH] IN BLOOD BY AUTOMATED COUNT: 15.3 % (ref 11.5–17)
FERRITIN SERPL-MCNC: 26.02 NG/ML (ref 4.63–204)
GFR SERPLBLD CREATININE-BSD FMLA CKD-EPI: >60 MLS/MIN/1.73/M2
GLOBULIN SER-MCNC: 3.2 GM/DL (ref 2.4–3.5)
GLUCOSE SERPL-MCNC: 101 MG/DL (ref 74–100)
HCT VFR BLD AUTO: 30.6 % (ref 37–47)
HGB BLD-MCNC: 9.6 G/DL (ref 12–16)
IMM GRANULOCYTES # BLD AUTO: 0.02 X10(3)/MCL (ref 0–0.04)
IMM GRANULOCYTES NFR BLD AUTO: 0.4 %
IRON SATN MFR SERPL: 8 % (ref 20–50)
IRON SERPL-MCNC: 24 UG/DL (ref 50–170)
LYMPHOCYTES # BLD AUTO: 1.64 X10(3)/MCL (ref 0.6–4.6)
LYMPHOCYTES NFR BLD AUTO: 31.5 %
MCH RBC QN AUTO: 28.9 PG (ref 27–31)
MCHC RBC AUTO-ENTMCNC: 31.4 G/DL (ref 33–36)
MCV RBC AUTO: 92.2 FL (ref 80–94)
MONOCYTES # BLD AUTO: 0.63 X10(3)/MCL (ref 0.1–1.3)
MONOCYTES NFR BLD AUTO: 12.1 %
NEUTROPHILS # BLD AUTO: 2.73 X10(3)/MCL (ref 2.1–9.2)
NEUTROPHILS NFR BLD AUTO: 52.3 %
NRBC BLD AUTO-RTO: 0 %
PLATELET # BLD AUTO: 366 X10(3)/MCL (ref 130–400)
PMV BLD AUTO: 10.9 FL (ref 7.4–10.4)
POTASSIUM SERPL-SCNC: 4 MMOL/L (ref 3.5–5.1)
PROT SERPL-MCNC: 6.9 GM/DL (ref 6.4–8.3)
RBC # BLD AUTO: 3.32 X10(6)/MCL (ref 4.2–5.4)
SODIUM SERPL-SCNC: 141 MMOL/L (ref 136–145)
TIBC SERPL-MCNC: 289 UG/DL (ref 70–310)
TIBC SERPL-MCNC: 313 UG/DL (ref 250–450)
TRANSFERRIN SERPL-MCNC: 276 MG/DL (ref 180–382)
WBC # SPEC AUTO: 5.21 X10(3)/MCL (ref 4.5–11.5)

## 2023-08-29 PROCEDURE — 82306 VITAMIN D 25 HYDROXY: CPT

## 2023-08-29 PROCEDURE — 80053 COMPREHEN METABOLIC PANEL: CPT

## 2023-08-29 PROCEDURE — 85025 COMPLETE CBC W/AUTO DIFF WBC: CPT

## 2023-08-29 PROCEDURE — 83550 IRON BINDING TEST: CPT

## 2023-08-29 PROCEDURE — 36415 COLL VENOUS BLD VENIPUNCTURE: CPT

## 2023-08-29 PROCEDURE — 82728 ASSAY OF FERRITIN: CPT

## 2023-08-30 ENCOUNTER — OFFICE VISIT (OUTPATIENT)
Dept: HEMATOLOGY/ONCOLOGY | Facility: CLINIC | Age: 48
End: 2023-08-30
Payer: MEDICAID

## 2023-08-30 VITALS — HEIGHT: 62 IN | BODY MASS INDEX: 29.62 KG/M2

## 2023-08-30 DIAGNOSIS — D50.0 IRON DEFICIENCY ANEMIA SECONDARY TO BLOOD LOSS (CHRONIC): Primary | ICD-10-CM

## 2023-08-30 DIAGNOSIS — D47.2 MONOCLONAL GAMMOPATHY OF UNKNOWN SIGNIFICANCE (MGUS): ICD-10-CM

## 2023-08-30 DIAGNOSIS — N92.4 EXCESSIVE BLEEDING IN PREMENOPAUSAL PERIOD: ICD-10-CM

## 2023-08-30 PROCEDURE — 1160F PR REVIEW ALL MEDS BY PRESCRIBER/CLIN PHARMACIST DOCUMENTED: ICD-10-PCS | Mod: CPTII,95,, | Performed by: NURSE PRACTITIONER

## 2023-08-30 PROCEDURE — 3008F PR BODY MASS INDEX (BMI) DOCUMENTED: ICD-10-PCS | Mod: CPTII,95,, | Performed by: NURSE PRACTITIONER

## 2023-08-30 PROCEDURE — 99214 PR OFFICE/OUTPT VISIT, EST, LEVL IV, 30-39 MIN: ICD-10-PCS | Mod: 95,,, | Performed by: NURSE PRACTITIONER

## 2023-08-30 PROCEDURE — 1159F PR MEDICATION LIST DOCUMENTED IN MEDICAL RECORD: ICD-10-PCS | Mod: CPTII,95,, | Performed by: NURSE PRACTITIONER

## 2023-08-30 PROCEDURE — 99214 OFFICE O/P EST MOD 30 MIN: CPT | Mod: 95,,, | Performed by: NURSE PRACTITIONER

## 2023-08-30 PROCEDURE — 1160F RVW MEDS BY RX/DR IN RCRD: CPT | Mod: CPTII,95,, | Performed by: NURSE PRACTITIONER

## 2023-08-30 PROCEDURE — 3008F BODY MASS INDEX DOCD: CPT | Mod: CPTII,95,, | Performed by: NURSE PRACTITIONER

## 2023-08-30 PROCEDURE — 1159F MED LIST DOCD IN RCRD: CPT | Mod: CPTII,95,, | Performed by: NURSE PRACTITIONER

## 2023-08-30 RX ORDER — PRAVASTATIN SODIUM 20 MG/1
20 TABLET ORAL NIGHTLY
COMMUNITY
Start: 2023-08-13

## 2023-08-30 RX ORDER — EZETIMIBE 10 MG/1
10 TABLET ORAL
COMMUNITY
Start: 2023-08-14

## 2023-08-30 NOTE — PROGRESS NOTES
Chief Complaint   Iron deficiency anemia      Problem list:  1. History of microcytic anemia with suggestion of iron deficiency on partial iron studies  2. Monoclonal gammopathy    Current Treatment:  Feraheme prn    Treatment History:  Feraheme x2 (2019)  Feraheme 510 mg IV x2 (06/15/2020, 2020)  Feraheme 510mg IV x2 (2020 & 2020)  Feraheme 510mg IV x2 (2022  & 2022)      Past medical history: Anemia. Anxiety. Allergic rhinitis. Benign cyst of breast. Cataracts. Constipation. Depression. Exercise intolerance. GERD. History of intracranial hemorrhage. Memory loss. Headaches. Hemorrhoids. Hypertension. Insomnia. Peptic ulcer disease. 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 Peptic ulcer. Bilateral mammoplasty, augmentation, silicone implants, and mastopexy 2017.  x2. Cholecystectomy.  Social history: Single. Lives in [place]. Has 2 children. Currently, unemployed. Used to work for the [employer]. Denies history of tobacco, alcohol, or illicit drug abuse.  Family history: Father experienced pancreatic cancer at age 56; used to smoke. Father is a sister experienced colon cancer in her 30s.  Health maintenance: 10/25/2018: Screening mammogram: No evidence of malignancy.  Menstrual history: Has menstrual cycles once or twice a month; does relate history of blood clots with menstrual flow.    History of present illness:  For details, please see Dr. Leach's last note dated 10/08/2020. Please also refer to assessment and plan section.    Interval History 2023: Patient presents today virtually for ongoing management of JAQUELINE. She is currently on oral iron supplements of which she reports compliance. Her last iron infusion was in 2023. Patient says that she is currently experiencing dyspnea, severe fatigue and weakness. She reports heavy menstrual cycle. Lab work reviewd with  patient, low iron and ferritin level along with low H&H. Patient says that after her last iron infusion her symptoms did improve. Discussed plan of care with patient.     This is a telemedicine note. Patient was treated using telemedicine, real time audio and video, according to [facility] protocols. I, distant provider, conducted the visit from location identified below. The patient participated in the visit at a non-[facility] location selected by the patient (or patient's representative), identified below. I am licensed in the state where the patient stated they are located. The patient (or patient's representative) stated that they understood and accepted the privacy and security risks to their information at their location. Patient was located at her/his residence in , [place]. I, distant provider, was located at [facility].    Face to face time spent with patient exceeds minutes, over 50% of which was used for education and counseling regarding medical conditions, current medications including risk/benefit and side effects/adverse events, over the counter medications-uses/doses, home self-care and contact precautions, and red flags and indications for immediate medical attention. The patient is receptive, expresses understanding and is agreeable to plan. All questions answered.      Review of Systems: A complete 12-point ROS was performed in full with pertinent positives as described in interval history. Remainder of ROS done in full and are negative.       Lab Results   Component Value Date    WBC 5.21 08/29/2023    RBC 3.32 (L) 08/29/2023    HGB 9.6 (L) 08/29/2023    HCT 30.6 (L) 08/29/2023    MCV 92.2 08/29/2023    MCH 28.9 08/29/2023    MCHC 31.4 (L) 08/29/2023    RDW 15.3 08/29/2023     08/29/2023    MPV 10.9 (H) 08/29/2023    GRAN 3.3 07/16/2023    GRAN 57.3 07/16/2023    LYMPH 1.9 07/16/2023    LYMPH 33.5 07/16/2023    MONO 0.4 07/16/2023    MONO 6.7 07/16/2023    EOS 0.1 07/16/2023    BASO 0.04 07/16/2023     EOSINOPHIL 1.6 07/16/2023    BASOPHIL 0.7 07/16/2023     CMP  Sodium   Date Value Ref Range Status   07/16/2023 136 136 - 145 mmol/L Final     Sodium Level   Date Value Ref Range Status   08/29/2023 141 136 - 145 mmol/L Final     Potassium   Date Value Ref Range Status   07/16/2023 3.4 (L) 3.5 - 5.1 mmol/L Final     Comment:     Anion Gap reference range revised on 4/28/2023     Potassium Level   Date Value Ref Range Status   08/29/2023 4.0 3.5 - 5.1 mmol/L Final     Chloride   Date Value Ref Range Status   07/16/2023 104 95 - 110 mmol/L Final     CO2   Date Value Ref Range Status   07/16/2023 21 (L) 22 - 31 mmol/L Final     Carbon Dioxide   Date Value Ref Range Status   08/29/2023 24 22 - 29 mmol/L Final     Glucose   Date Value Ref Range Status   07/16/2023 129 (H) 70 - 110 mg/dL Final     Comment:     The ADA recommends the following guidelines for fasting glucose:    Normal:       less than 100 mg/dL    Prediabetes:  100 mg/dL to 125 mg/dL    Diabetes:     126 mg/dL or higher       BUN   Date Value Ref Range Status   07/16/2023 14 7 - 18 mg/dL Final     Blood Urea Nitrogen   Date Value Ref Range Status   08/29/2023 10.0 7.0 - 18.7 mg/dL Final     Creatinine   Date Value Ref Range Status   08/29/2023 0.66 0.55 - 1.02 mg/dL Final   07/16/2023 0.57 0.50 - 1.40 mg/dL Final     Calcium   Date Value Ref Range Status   07/16/2023 8.4 8.4 - 10.2 mg/dL Final     Calcium Level Total   Date Value Ref Range Status   08/29/2023 8.8 8.4 - 10.2 mg/dL Final     Total Protein   Date Value Ref Range Status   07/16/2023 7.4 6.0 - 8.4 g/dL Final     Albumin   Date Value Ref Range Status   07/16/2023 4.2 3.5 - 5.2 g/dL Final     Albumin Level   Date Value Ref Range Status   08/29/2023 3.7 3.5 - 5.0 g/dL Final     Total Bilirubin   Date Value Ref Range Status   07/16/2023 0.4 0.2 - 1.3 mg/dL Final     Bilirubin Total   Date Value Ref Range Status   08/29/2023 0.4 <=1.5 mg/dL Final     Alkaline Phosphatase   Date Value Ref Range  Status   08/29/2023 54 40 - 150 unit/L Final   07/16/2023 66 38 - 145 U/L Final     AST   Date Value Ref Range Status   07/16/2023 34 14 - 36 U/L Final     Aspartate Aminotransferase   Date Value Ref Range Status   08/29/2023 24 5 - 34 unit/L Final     ALT   Date Value Ref Range Status   07/16/2023 23 0 - 35 U/L Final     Alanine Aminotransferase   Date Value Ref Range Status   08/29/2023 20 0 - 55 unit/L Final     Anion Gap   Date Value Ref Range Status   07/16/2023 11 5 - 12 mmol/L Final     Comment:     Anion Gap reference range revised on 4/28/2023     eGFR   Date Value Ref Range Status   08/29/2023 >60 mls/min/1.73/m2 Final   07/16/2023 >60 >60 mL/min/1.73 m^2 Final       Lab Results   Component Value Date    UIBC 289 08/29/2023    IRON 24 (L) 08/29/2023    TRANS 276 08/29/2023    TIBC 313 08/29/2023    LABIRON 8 (L) 08/29/2023      Lab Results   Component Value Date    FERRITIN 26.02 08/29/2023       Physical Exam: Physical Exam was not completed today as this was a telemedicine visit.     Assessment / Plan   1. Iron deficiency anemia   -Severe iron deficiency anemia  -Iron pills caused constipation which caused bleeding hemorrhoids (2018)  -No response to oral iron therapy  -No GI source of bleeding (EGD and colonoscopy: 05/2019)  -Feraheme x2 (06/2019), with good response  -Deficiency recurred 11/2019 but hemoglobin normal  -06/05/2020: Ferritin 3.7, dropped; hemoglobin 9.5 (down from 14.4 on 11/14/2090)  -Feraheme 510 mg IV x2 (06/15/2020, 06/22/2020)  -Iron deficiency and anemia dramatically resolved (07/20/2020)  -Capsule endoscopy (08/20/2020): Negative  -10/07/2020: Remains iron deficient but hemoglobin is normal (transferrin saturation 10.2%, ferritin 10.1, hemoglobin 14)  (Ferritin was 117 on 07/20/2020)  -12/07/2020: Remains iron deficient but hemoglobin remains normal (serum iron 24; TIBC 359; transferrin saturation 7%; ferritin 11.9, was 10.1 on 10/07/2020, 117 on 07/20/2020; hemoglobin  13.4)  Feraheme 510mg IV x2 (12/16/2022, 12/28/2022)     2. MGUS (monoclonal gammopathy of unknown significance)   -IgG kappa monoclonal gammopathy (MGUS)  -Diagnosed 04/2018  -Stable as of 11/2019  -05/18/2020, 06/05/2020: Stable  -06/05/2020: 0.3 g/dL IgG kappa monoclonal spike, stable since 11/2019   Patient has low risk IgG kappa monoclonal gammopathy (MGUS).  Repeat paraproteinemia labs in 1 year (05/2021).  -6/16/2021: M spike 0.47g/dL; K/L ratio WNL. No urine M spike.    # History of noncompliance with follow-up appointments with various specialties    # Thyroid mass/nodule, in need of biopsy (ultrasound 03/12/2019)  Ultrasound (03/12/2019): 3 cm solid nodule lower right thyroid  -Thyroid nodule biopsy scheduled for 01/13/2020  -01/13/2020: Right thyroid nodule, ultrasound-guided FNA: Benign follicular nodule with cystic degeneration    # Health maintenance:  -12/05/2019: Screening mammogram: No evidence of malignancy; BI-RADS 2: Benign  -Screening mammogram in 12/16/2020 - no mammographic concerns for malignancy     Plan:  -Status post Feraheme x2 (06/2020), with dramatic resolution of iron deficiency and anemia  -No GI source of bleeding (EGD, colonoscopy, capsule endoscopy)  -10/07/2020: Hemoglobin normal but iron deficiency has recurred since July 2020  -12/07/2020: Hemoglobin remains normal but iron deficiency persists  -Failed oral iron therapy in the past      Iron level 24, ferritin 26; patient symptomatic therefore will give x2 doses of Feraheme    Repeat lab work in 6 weeks post completion of iron infusion    Follow up with GYN as scheduled    Annual MMG due 1/2024    Follow up with Np in 8 weeks with lab work cbc,cmp, iron prfile, ferritin level via TM

## 2023-08-30 NOTE — Clinical Note
infusion for Feraheme to start Friday if possible fu w/np in 8 weeks with lab work cbc, iron profile, cmp, ferritin

## 2023-09-08 ENCOUNTER — INFUSION (OUTPATIENT)
Dept: INFUSION THERAPY | Facility: HOSPITAL | Age: 48
End: 2023-09-08
Attending: INTERNAL MEDICINE
Payer: MEDICAID

## 2023-09-08 VITALS
WEIGHT: 161.19 LBS | BODY MASS INDEX: 30.43 KG/M2 | SYSTOLIC BLOOD PRESSURE: 140 MMHG | RESPIRATION RATE: 20 BRPM | HEART RATE: 64 BPM | TEMPERATURE: 99 F | OXYGEN SATURATION: 100 % | DIASTOLIC BLOOD PRESSURE: 86 MMHG | HEIGHT: 61 IN

## 2023-09-08 DIAGNOSIS — Z86.2 HISTORY OF IRON DEFICIENCY ANEMIA: Primary | ICD-10-CM

## 2023-09-08 PROCEDURE — 96374 THER/PROPH/DIAG INJ IV PUSH: CPT

## 2023-09-08 PROCEDURE — 63600175 PHARM REV CODE 636 W HCPCS: Mod: JZ,JG | Performed by: NURSE PRACTITIONER

## 2023-09-08 PROCEDURE — 25000003 PHARM REV CODE 250: Performed by: NURSE PRACTITIONER

## 2023-09-08 PROCEDURE — 96375 TX/PRO/DX INJ NEW DRUG ADDON: CPT

## 2023-09-08 RX ADMIN — FERUMOXYTOL 510 MG: 510 INJECTION INTRAVENOUS at 09:09

## 2023-09-08 NOTE — LETTER
September 8, 2023      [facility]  [address] [address]  Phone: [phone]  Fax: [phone]       Patient: Pretty Pratt   YOB: 1975  Date of Visit: 09/08/2023    To Whom It May Concern:    ZOHRA Pratt  was at [facility] on 09/08/2023. The patient may return to work on 9/9/23 with no restrictions. If you have any questions or concerns, or if I can be of further assistance, please do not hesitate to contact me.    Sincerely,    Dr JD Cruz

## 2023-09-08 NOTE — NURSING
08:15 Arrive for Feraheme c/o of having issues with constipation due to oral iron supplement take oral stool softners for relief. Have access to patient portal for viewing appointments. Next infusion scheduled for 9/15/23.

## 2023-09-09 RX ORDER — ERGOCALCIFEROL 1.25 MG/1
50000 CAPSULE ORAL
Qty: 12 CAPSULE | Refills: 0 | Status: SHIPPED | OUTPATIENT
Start: 2023-09-09 | End: 2023-10-05 | Stop reason: SDUPTHER

## 2023-09-15 ENCOUNTER — INFUSION (OUTPATIENT)
Dept: INFUSION THERAPY | Facility: HOSPITAL | Age: 48
End: 2023-09-15
Attending: INTERNAL MEDICINE
Payer: MEDICAID

## 2023-09-15 VITALS
HEIGHT: 62 IN | RESPIRATION RATE: 20 BRPM | BODY MASS INDEX: 28.72 KG/M2 | SYSTOLIC BLOOD PRESSURE: 122 MMHG | OXYGEN SATURATION: 97 % | WEIGHT: 156.06 LBS | DIASTOLIC BLOOD PRESSURE: 86 MMHG | HEART RATE: 76 BPM | TEMPERATURE: 98 F

## 2023-09-15 DIAGNOSIS — Z86.2 HISTORY OF IRON DEFICIENCY ANEMIA: Primary | ICD-10-CM

## 2023-09-15 PROCEDURE — 63600175 PHARM REV CODE 636 W HCPCS: Mod: JZ,JG | Performed by: NURSE PRACTITIONER

## 2023-09-15 PROCEDURE — 25000003 PHARM REV CODE 250: Performed by: NURSE PRACTITIONER

## 2023-09-15 PROCEDURE — 96374 THER/PROPH/DIAG INJ IV PUSH: CPT

## 2023-09-15 RX ADMIN — FERUMOXYTOL 510 MG: 510 INJECTION INTRAVENOUS at 10:09

## 2023-10-05 RX ORDER — PANTOPRAZOLE SODIUM 40 MG/1
40 TABLET, DELAYED RELEASE ORAL DAILY
Qty: 30 TABLET | Refills: 5 | Status: SHIPPED | OUTPATIENT
Start: 2023-10-05 | End: 2024-03-25

## 2023-10-05 RX ORDER — LORATADINE 10 MG/1
10 TABLET ORAL DAILY
Qty: 30 TABLET | Refills: 5 | Status: SHIPPED | OUTPATIENT
Start: 2023-10-05 | End: 2024-01-10 | Stop reason: SDUPTHER

## 2023-10-05 RX ORDER — FLUOXETINE 10 MG/1
10 CAPSULE ORAL 2 TIMES DAILY
Qty: 60 CAPSULE | Refills: 2 | Status: SHIPPED | OUTPATIENT
Start: 2023-10-05 | End: 2023-12-12 | Stop reason: SDUPTHER

## 2023-10-05 RX ORDER — ERGOCALCIFEROL 1.25 MG/1
50000 CAPSULE ORAL
Qty: 12 CAPSULE | Refills: 0 | Status: SHIPPED | OUTPATIENT
Start: 2023-10-05

## 2023-10-24 ENCOUNTER — APPOINTMENT (OUTPATIENT)
Dept: HEMATOLOGY/ONCOLOGY | Facility: CLINIC | Age: 48
End: 2023-10-24
Payer: MEDICAID

## 2023-10-24 DIAGNOSIS — Z86.2 HISTORY OF IRON DEFICIENCY ANEMIA: ICD-10-CM

## 2023-10-24 LAB
ALBUMIN SERPL-MCNC: 3.4 G/DL (ref 3.5–5)
ALBUMIN/GLOB SERPL: 1.1 RATIO (ref 1.1–2)
ALP SERPL-CCNC: 57 UNIT/L (ref 40–150)
ALT SERPL-CCNC: 14 UNIT/L (ref 0–55)
AST SERPL-CCNC: 17 UNIT/L (ref 5–34)
BASOPHILS # BLD AUTO: 0.07 X10(3)/MCL
BASOPHILS NFR BLD AUTO: 1.2 %
BILIRUB SERPL-MCNC: 0.4 MG/DL
BUN SERPL-MCNC: 8.6 MG/DL (ref 7–18.7)
CALCIUM SERPL-MCNC: 8.8 MG/DL (ref 8.4–10.2)
CHLORIDE SERPL-SCNC: 107 MMOL/L (ref 98–107)
CO2 SERPL-SCNC: 25 MMOL/L (ref 22–29)
CREAT SERPL-MCNC: 0.82 MG/DL (ref 0.55–1.02)
EOSINOPHIL # BLD AUTO: 0.2 X10(3)/MCL (ref 0–0.9)
EOSINOPHIL NFR BLD AUTO: 3.4 %
ERYTHROCYTE [DISTWIDTH] IN BLOOD BY AUTOMATED COUNT: 14.9 % (ref 11.5–17)
FERRITIN SERPL-MCNC: 79.6 NG/ML (ref 4.63–204)
GFR SERPLBLD CREATININE-BSD FMLA CKD-EPI: >60 MLS/MIN/1.73/M2
GLOBULIN SER-MCNC: 3.1 GM/DL (ref 2.4–3.5)
GLUCOSE SERPL-MCNC: 96 MG/DL (ref 74–100)
HCT VFR BLD AUTO: 37.3 % (ref 37–47)
HGB BLD-MCNC: 12.4 G/DL (ref 12–16)
IMM GRANULOCYTES # BLD AUTO: 0.02 X10(3)/MCL (ref 0–0.04)
IMM GRANULOCYTES NFR BLD AUTO: 0.3 %
IRON SATN MFR SERPL: 17 % (ref 20–50)
IRON SERPL-MCNC: 41 UG/DL (ref 50–170)
LYMPHOCYTES # BLD AUTO: 1.48 X10(3)/MCL (ref 0.6–4.6)
LYMPHOCYTES NFR BLD AUTO: 25.4 %
MCH RBC QN AUTO: 30.8 PG (ref 27–31)
MCHC RBC AUTO-ENTMCNC: 33.2 G/DL (ref 33–36)
MCV RBC AUTO: 92.6 FL (ref 80–94)
MONOCYTES # BLD AUTO: 0.56 X10(3)/MCL (ref 0.1–1.3)
MONOCYTES NFR BLD AUTO: 9.6 %
NEUTROPHILS # BLD AUTO: 3.5 X10(3)/MCL (ref 2.1–9.2)
NEUTROPHILS NFR BLD AUTO: 60.1 %
NRBC BLD AUTO-RTO: 0 %
PLATELET # BLD AUTO: 348 X10(3)/MCL (ref 130–400)
PMV BLD AUTO: 10.3 FL (ref 7.4–10.4)
POTASSIUM SERPL-SCNC: 3.7 MMOL/L (ref 3.5–5.1)
PROT SERPL-MCNC: 6.5 GM/DL (ref 6.4–8.3)
RBC # BLD AUTO: 4.03 X10(6)/MCL (ref 4.2–5.4)
SODIUM SERPL-SCNC: 139 MMOL/L (ref 136–145)
TIBC SERPL-MCNC: 199 UG/DL (ref 70–310)
TIBC SERPL-MCNC: 240 UG/DL (ref 250–450)
TRANSFERRIN SERPL-MCNC: 218 MG/DL (ref 180–382)
WBC # SPEC AUTO: 5.83 X10(3)/MCL (ref 4.5–11.5)

## 2023-10-24 PROCEDURE — 36415 COLL VENOUS BLD VENIPUNCTURE: CPT

## 2023-10-24 PROCEDURE — 80053 COMPREHEN METABOLIC PANEL: CPT

## 2023-10-24 PROCEDURE — 85025 COMPLETE CBC W/AUTO DIFF WBC: CPT

## 2023-10-24 PROCEDURE — 83540 ASSAY OF IRON: CPT

## 2023-10-24 PROCEDURE — 82728 ASSAY OF FERRITIN: CPT

## 2023-10-25 ENCOUNTER — OFFICE VISIT (OUTPATIENT)
Dept: HEMATOLOGY/ONCOLOGY | Facility: CLINIC | Age: 48
End: 2023-10-25
Payer: MEDICAID

## 2023-10-25 DIAGNOSIS — D50.0 IRON DEFICIENCY ANEMIA SECONDARY TO BLOOD LOSS (CHRONIC): Primary | ICD-10-CM

## 2023-10-25 PROCEDURE — 1159F PR MEDICATION LIST DOCUMENTED IN MEDICAL RECORD: ICD-10-PCS | Mod: CPTII,95,, | Performed by: NURSE PRACTITIONER

## 2023-10-25 PROCEDURE — 99214 PR OFFICE/OUTPT VISIT, EST, LEVL IV, 30-39 MIN: ICD-10-PCS | Mod: FQ,95,, | Performed by: NURSE PRACTITIONER

## 2023-10-25 PROCEDURE — 1160F PR REVIEW ALL MEDS BY PRESCRIBER/CLIN PHARMACIST DOCUMENTED: ICD-10-PCS | Mod: CPTII,95,, | Performed by: NURSE PRACTITIONER

## 2023-10-25 PROCEDURE — 1159F MED LIST DOCD IN RCRD: CPT | Mod: CPTII,95,, | Performed by: NURSE PRACTITIONER

## 2023-10-25 PROCEDURE — 1160F RVW MEDS BY RX/DR IN RCRD: CPT | Mod: CPTII,95,, | Performed by: NURSE PRACTITIONER

## 2023-10-25 PROCEDURE — 99214 OFFICE O/P EST MOD 30 MIN: CPT | Mod: FQ,95,, | Performed by: NURSE PRACTITIONER

## 2023-10-25 RX ORDER — HEPARIN 100 UNIT/ML
500 SYRINGE INTRAVENOUS
Status: CANCELLED | OUTPATIENT
Start: 2023-10-26

## 2023-10-25 RX ORDER — EPINEPHRINE 0.3 MG/.3ML
0.3 INJECTION SUBCUTANEOUS ONCE AS NEEDED
Status: CANCELLED | OUTPATIENT
Start: 2023-10-26

## 2023-10-25 RX ORDER — DIPHENHYDRAMINE HYDROCHLORIDE 50 MG/ML
50 INJECTION INTRAMUSCULAR; INTRAVENOUS ONCE AS NEEDED
Status: CANCELLED | OUTPATIENT
Start: 2023-10-26

## 2023-10-25 RX ORDER — SODIUM CHLORIDE 0.9 % (FLUSH) 0.9 %
10 SYRINGE (ML) INJECTION
Status: CANCELLED | OUTPATIENT
Start: 2023-10-26

## 2023-10-25 NOTE — Clinical Note
Patient will need to be set up for x2 doses of Feraheme. She recently received last month may still be authorized. New plan was put into chart

## 2023-10-25 NOTE — PROGRESS NOTES
Chief Complaint   Iron deficiency anemia      Problem list:  1. History of microcytic anemia with suggestion of iron deficiency on partial iron studies  2. Monoclonal gammopathy    Current Treatment:  Feraheme prn    Treatment History:  Feraheme x2 (2019)  Feraheme 510 mg IV x2 (06/15/2020, 2020)  Feraheme 510mg IV x2 (2020 & 2020)  Feraheme 510mg IV x2 (2022  & 2022)      Past medical history: Anemia. Anxiety. Allergic rhinitis. Benign cyst of breast. Cataracts. Constipation. Depression. Exercise intolerance. GERD. History of intracranial hemorrhage. Memory loss. Headaches. Hemorrhoids. Hypertension. Insomnia. Peptic ulcer disease. 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 Peptic ulcer. Bilateral mammoplasty, augmentation, silicone implants, and mastopexy 2017.  x2. Cholecystectomy.  Social history: Single. Lives in [place]. Has 2 children. Currently, unemployed. Used to work for the [employer]. Denies history of tobacco, alcohol, or illicit drug abuse.  Family history: Father experienced pancreatic cancer at age 56; used to smoke. Father is a sister experienced colon cancer in her 30s.  Health maintenance: 10/25/2018: Screening mammogram: No evidence of malignancy.  Menstrual history: Has menstrual cycles once or twice a month; does relate history of blood clots with menstrual flow.    History of present illness:  For details, please see Dr. Leach's last note dated 10/08/2020. Please also refer to assessment and plan section.    Interval History 2023: Patient presents today virtually for ongoing management of JAQUELINE. She is currently on oral iron supplements of which she reports compliance. Her last iron infusion was in 2023. Patient says that she is currently experiencing severe fatigue.  She reports heavy menstrual cycle. Lab work reviewd with patient, low iron 41 and  ferritin WNL (lower end of normal). Discussed plan of care and follow up with patient    This is a telemedicine note. Patient was treated using telemedicine, real time audio and video, according to [facility] protocols. I, distant provider, conducted the visit from location identified below. The patient participated in the visit at a non-[facility] location selected by the patient (or patient's representative), identified below. I am licensed in the state where the patient stated they are located. The patient (or patient's representative) stated that they understood and accepted the privacy and security risks to their information at their location. Patient was located at her/his residence in , [place]. I, distant provider, was located at [facility].    Face to face time spent with patient exceeds 15 minutes, over 50% of which was used for education and counseling regarding medical conditions, current medications including risk/benefit and side effects/adverse events, over the counter medications-uses/doses, home self-care and contact precautions, and red flags and indications for immediate medical attention. The patient is receptive, expresses understanding and is agreeable to plan. All questions answered.      Review of Systems: A complete 12-point ROS was performed in full with pertinent positives as described in interval history. Remainder of ROS done in full and are negative.       Lab Results   Component Value Date    WBC 5.83 10/24/2023    RBC 4.03 (L) 10/24/2023    HGB 12.4 10/24/2023    HCT 37.3 10/24/2023    MCV 92.6 10/24/2023    MCH 30.8 10/24/2023    MCHC 33.2 10/24/2023    RDW 14.9 10/24/2023     10/24/2023    MPV 10.3 10/24/2023    GRAN 3.3 07/16/2023    GRAN 57.3 07/16/2023    LYMPH 1.9 07/16/2023    LYMPH 33.5 07/16/2023    MONO 0.4 07/16/2023    MONO 6.7 07/16/2023    EOS 0.1 07/16/2023    BASO 0.04 07/16/2023    EOSINOPHIL 1.6 07/16/2023    BASOPHIL 0.7 07/16/2023     CMP  Sodium   Date Value Ref Range Status    07/16/2023 136 136 - 145 mmol/L Final     Sodium Level   Date Value Ref Range Status   10/24/2023 139 136 - 145 mmol/L Final     Potassium   Date Value Ref Range Status   07/16/2023 3.4 (L) 3.5 - 5.1 mmol/L Final     Comment:     Anion Gap reference range revised on 4/28/2023     Potassium Level   Date Value Ref Range Status   10/24/2023 3.7 3.5 - 5.1 mmol/L Final     Chloride   Date Value Ref Range Status   07/16/2023 104 95 - 110 mmol/L Final     CO2   Date Value Ref Range Status   07/16/2023 21 (L) 22 - 31 mmol/L Final     Carbon Dioxide   Date Value Ref Range Status   10/24/2023 25 22 - 29 mmol/L Final     Glucose   Date Value Ref Range Status   07/16/2023 129 (H) 70 - 110 mg/dL Final     Comment:     The ADA recommends the following guidelines for fasting glucose:    Normal:       less than 100 mg/dL    Prediabetes:  100 mg/dL to 125 mg/dL    Diabetes:     126 mg/dL or higher       BUN   Date Value Ref Range Status   07/16/2023 14 7 - 18 mg/dL Final     Blood Urea Nitrogen   Date Value Ref Range Status   10/24/2023 8.6 7.0 - 18.7 mg/dL Final     Creatinine   Date Value Ref Range Status   10/24/2023 0.82 0.55 - 1.02 mg/dL Final   07/16/2023 0.57 0.50 - 1.40 mg/dL Final     Calcium   Date Value Ref Range Status   07/16/2023 8.4 8.4 - 10.2 mg/dL Final     Calcium Level Total   Date Value Ref Range Status   10/24/2023 8.8 8.4 - 10.2 mg/dL Final     Total Protein   Date Value Ref Range Status   07/16/2023 7.4 6.0 - 8.4 g/dL Final     Albumin   Date Value Ref Range Status   07/16/2023 4.2 3.5 - 5.2 g/dL Final     Albumin Level   Date Value Ref Range Status   10/24/2023 3.4 (L) 3.5 - 5.0 g/dL Final     Total Bilirubin   Date Value Ref Range Status   07/16/2023 0.4 0.2 - 1.3 mg/dL Final     Bilirubin Total   Date Value Ref Range Status   10/24/2023 0.4 <=1.5 mg/dL Final     Alkaline Phosphatase   Date Value Ref Range Status   10/24/2023 57 40 - 150 unit/L Final   07/16/2023 66 38 - 145 U/L Final     AST   Date Value  Ref Range Status   07/16/2023 34 14 - 36 U/L Final     Aspartate Aminotransferase   Date Value Ref Range Status   10/24/2023 17 5 - 34 unit/L Final     ALT   Date Value Ref Range Status   07/16/2023 23 0 - 35 U/L Final     Alanine Aminotransferase   Date Value Ref Range Status   10/24/2023 14 0 - 55 unit/L Final     Anion Gap   Date Value Ref Range Status   07/16/2023 11 5 - 12 mmol/L Final     Comment:     Anion Gap reference range revised on 4/28/2023     eGFR   Date Value Ref Range Status   10/24/2023 >60 mls/min/1.73/m2 Final   07/16/2023 >60 >60 mL/min/1.73 m^2 Final       Lab Results   Component Value Date    UIBC 199 10/24/2023    IRON 41 (L) 10/24/2023    TRANS 218 10/24/2023    TIBC 240 (L) 10/24/2023    LABIRON 17 (L) 10/24/2023      Lab Results   Component Value Date    FERRITIN 79.60 10/24/2023       Physical Exam: Physical Exam was not completed today as this was a telemedicine visit.     Assessment / Plan   1. Iron deficiency anemia   -Severe iron deficiency anemia  -Iron pills caused constipation which caused bleeding hemorrhoids (2018)  -No response to oral iron therapy  -No GI source of bleeding (EGD and colonoscopy: 05/2019)  -Feraheme x2 (06/2019), with good response  -Deficiency recurred 11/2019 but hemoglobin normal  -06/05/2020: Ferritin 3.7, dropped; hemoglobin 9.5 (down from 14.4 on 11/14/2090)  -Feraheme 510 mg IV x2 (06/15/2020, 06/22/2020)  -Iron deficiency and anemia dramatically resolved (07/20/2020)  -Capsule endoscopy (08/20/2020): Negative  -10/07/2020: Remains iron deficient but hemoglobin is normal (transferrin saturation 10.2%, ferritin 10.1, hemoglobin 14)  (Ferritin was 117 on 07/20/2020)  -12/07/2020: Remains iron deficient but hemoglobin remains normal (serum iron 24; TIBC 359; transferrin saturation 7%; ferritin 11.9, was 10.1 on 10/07/2020, 117 on 07/20/2020; hemoglobin 13.4)  Feraheme 510mg IV x2 (12/16/2022, 12/28/2022)     2. MGUS (monoclonal gammopathy of unknown  significance)   -IgG kappa monoclonal gammopathy (MGUS)  -Diagnosed 04/2018  -Stable as of 11/2019  -05/18/2020, 06/05/2020: Stable  -06/05/2020: 0.3 g/dL IgG kappa monoclonal spike, stable since 11/2019   Patient has low risk IgG kappa monoclonal gammopathy (MGUS).  Repeat paraproteinemia labs in 1 year (05/2021).  -6/16/2021: M spike 0.47g/dL; K/L ratio WNL. No urine M spike.    # History of noncompliance with follow-up appointments with various specialties    # Thyroid mass/nodule, in need of biopsy (ultrasound 03/12/2019)  Ultrasound (03/12/2019): 3 cm solid nodule lower right thyroid  -Thyroid nodule biopsy scheduled for 01/13/2020  -01/13/2020: Right thyroid nodule, ultrasound-guided FNA: Benign follicular nodule with cystic degeneration    # Health maintenance:  -12/05/2019: Screening mammogram: No evidence of malignancy; BI-RADS 2: Benign  -Screening mammogram in 12/16/2020 - no mammographic concerns for malignancy     Plan:  -Status post Feraheme x2 (06/2020), with dramatic resolution of iron deficiency and anemia  -No GI source of bleeding (EGD, colonoscopy, capsule endoscopy)  -10/07/2020: Hemoglobin normal but iron deficiency has recurred since July 2020  -12/07/2020: Hemoglobin remains normal but iron deficiency persists  -Failed oral iron therapy in the past        Iron level 41, ferritin 79.60, iron sat% 17, patient symptomatic therefore will give x2 doses of Feraheme  Repeat lab work in 6 weeks post completion of iron infusion  Follow up with NP in 8 weeks with lab work (cbc,cmp,iron profile, ferritin) via   Annual MMG due 1/2024

## 2023-10-31 ENCOUNTER — INFUSION (OUTPATIENT)
Dept: INFUSION THERAPY | Facility: HOSPITAL | Age: 48
End: 2023-10-31
Attending: INTERNAL MEDICINE
Payer: MEDICAID

## 2023-10-31 VITALS
DIASTOLIC BLOOD PRESSURE: 90 MMHG | HEART RATE: 66 BPM | BODY MASS INDEX: 28.64 KG/M2 | HEIGHT: 62 IN | SYSTOLIC BLOOD PRESSURE: 147 MMHG | OXYGEN SATURATION: 99 % | RESPIRATION RATE: 20 BRPM | WEIGHT: 155.63 LBS | TEMPERATURE: 98 F

## 2023-10-31 DIAGNOSIS — Z86.2 HISTORY OF IRON DEFICIENCY ANEMIA: Primary | ICD-10-CM

## 2023-10-31 PROCEDURE — 96365 THER/PROPH/DIAG IV INF INIT: CPT

## 2023-10-31 PROCEDURE — 63600175 PHARM REV CODE 636 W HCPCS: Mod: JZ,JG | Performed by: NURSE PRACTITIONER

## 2023-10-31 PROCEDURE — 25000003 PHARM REV CODE 250: Performed by: NURSE PRACTITIONER

## 2023-10-31 PROCEDURE — A4216 STERILE WATER/SALINE, 10 ML: HCPCS | Performed by: NURSE PRACTITIONER

## 2023-10-31 RX ORDER — SODIUM CHLORIDE 0.9 % (FLUSH) 0.9 %
10 SYRINGE (ML) INJECTION
Status: CANCELLED | OUTPATIENT
Start: 2023-10-31

## 2023-10-31 RX ORDER — HEPARIN 100 UNIT/ML
500 SYRINGE INTRAVENOUS
Status: CANCELLED | OUTPATIENT
Start: 2023-10-31

## 2023-10-31 RX ORDER — SODIUM CHLORIDE 0.9 % (FLUSH) 0.9 %
10 SYRINGE (ML) INJECTION
Status: DISCONTINUED | OUTPATIENT
Start: 2023-10-31 | End: 2023-10-31 | Stop reason: HOSPADM

## 2023-10-31 RX ORDER — EPINEPHRINE 0.3 MG/.3ML
0.3 INJECTION SUBCUTANEOUS ONCE AS NEEDED
Status: CANCELLED | OUTPATIENT
Start: 2023-10-31

## 2023-10-31 RX ORDER — DIPHENHYDRAMINE HYDROCHLORIDE 50 MG/ML
50 INJECTION INTRAMUSCULAR; INTRAVENOUS ONCE AS NEEDED
Status: CANCELLED | OUTPATIENT
Start: 2023-10-31

## 2023-10-31 RX ADMIN — Medication 10 ML: at 01:10

## 2023-10-31 RX ADMIN — FERUMOXYTOL 510 MG: 510 INJECTION INTRAVENOUS at 01:10

## 2023-10-31 NOTE — NURSING
1241  Pt arrived for #1/2 feraheme.  Pt accomp by her friend.  Pt rates abd cramping 8/10 and feeling weak and tired.  Pt states she is starting to get the iron infusions sooner than she used to.

## 2023-11-07 ENCOUNTER — INFUSION (OUTPATIENT)
Dept: INFUSION THERAPY | Facility: HOSPITAL | Age: 48
End: 2023-11-07
Attending: INTERNAL MEDICINE
Payer: MEDICAID

## 2023-11-07 VITALS
OXYGEN SATURATION: 98 % | SYSTOLIC BLOOD PRESSURE: 128 MMHG | DIASTOLIC BLOOD PRESSURE: 94 MMHG | RESPIRATION RATE: 20 BRPM | WEIGHT: 152.81 LBS | TEMPERATURE: 98 F | HEART RATE: 74 BPM | BODY MASS INDEX: 28.17 KG/M2

## 2023-11-07 DIAGNOSIS — Z86.2 HISTORY OF IRON DEFICIENCY ANEMIA: Primary | ICD-10-CM

## 2023-11-07 PROCEDURE — 96365 THER/PROPH/DIAG IV INF INIT: CPT

## 2023-11-07 PROCEDURE — 63600175 PHARM REV CODE 636 W HCPCS: Mod: JZ,JG | Performed by: NURSE PRACTITIONER

## 2023-11-07 PROCEDURE — 25000003 PHARM REV CODE 250: Performed by: NURSE PRACTITIONER

## 2023-11-07 RX ORDER — HEPARIN 100 UNIT/ML
500 SYRINGE INTRAVENOUS
OUTPATIENT
Start: 2023-11-07

## 2023-11-07 RX ORDER — DIPHENHYDRAMINE HYDROCHLORIDE 50 MG/ML
50 INJECTION INTRAMUSCULAR; INTRAVENOUS ONCE AS NEEDED
OUTPATIENT
Start: 2023-11-07

## 2023-11-07 RX ORDER — EPINEPHRINE 0.3 MG/.3ML
0.3 INJECTION SUBCUTANEOUS ONCE AS NEEDED
Status: DISCONTINUED | OUTPATIENT
Start: 2023-11-07 | End: 2023-11-07 | Stop reason: HOSPADM

## 2023-11-07 RX ORDER — DIPHENHYDRAMINE HYDROCHLORIDE 50 MG/ML
50 INJECTION INTRAMUSCULAR; INTRAVENOUS ONCE AS NEEDED
Status: DISCONTINUED | OUTPATIENT
Start: 2023-11-07 | End: 2023-11-07 | Stop reason: HOSPADM

## 2023-11-07 RX ORDER — SODIUM CHLORIDE 0.9 % (FLUSH) 0.9 %
10 SYRINGE (ML) INJECTION
Status: CANCELLED | OUTPATIENT
Start: 2023-11-07

## 2023-11-07 RX ORDER — SODIUM CHLORIDE 0.9 % (FLUSH) 0.9 %
10 SYRINGE (ML) INJECTION
Status: DISCONTINUED | OUTPATIENT
Start: 2023-11-07 | End: 2023-11-07 | Stop reason: HOSPADM

## 2023-11-07 RX ORDER — EPINEPHRINE 0.3 MG/.3ML
0.3 INJECTION SUBCUTANEOUS ONCE AS NEEDED
OUTPATIENT
Start: 2023-11-07

## 2023-11-07 RX ADMIN — FERUMOXYTOL 510 MG: 510 INJECTION INTRAVENOUS at 09:11

## 2023-12-08 RX ORDER — METOPROLOL TARTRATE 25 MG/1
25 TABLET, FILM COATED ORAL 2 TIMES DAILY
Qty: 60 TABLET | Refills: 1 | Status: SHIPPED | OUTPATIENT
Start: 2023-12-08 | End: 2024-02-06

## 2023-12-13 RX ORDER — FLUOXETINE 10 MG/1
10 CAPSULE ORAL 2 TIMES DAILY
Qty: 60 CAPSULE | Refills: 2 | Status: SHIPPED | OUTPATIENT
Start: 2023-12-13 | End: 2024-03-24 | Stop reason: SDUPTHER

## 2023-12-20 ENCOUNTER — APPOINTMENT (OUTPATIENT)
Dept: HEMATOLOGY/ONCOLOGY | Facility: CLINIC | Age: 48
End: 2023-12-20
Payer: MEDICAID

## 2023-12-20 ENCOUNTER — OFFICE VISIT (OUTPATIENT)
Dept: HEMATOLOGY/ONCOLOGY | Facility: CLINIC | Age: 48
End: 2023-12-20
Payer: MEDICAID

## 2023-12-20 VITALS
SYSTOLIC BLOOD PRESSURE: 128 MMHG | DIASTOLIC BLOOD PRESSURE: 88 MMHG | WEIGHT: 148 LBS | OXYGEN SATURATION: 98 % | TEMPERATURE: 98 F | HEIGHT: 62 IN | RESPIRATION RATE: 18 BRPM | BODY MASS INDEX: 27.23 KG/M2 | HEART RATE: 72 BPM

## 2023-12-20 DIAGNOSIS — D50.0 IRON DEFICIENCY ANEMIA SECONDARY TO BLOOD LOSS (CHRONIC): ICD-10-CM

## 2023-12-20 LAB
ALBUMIN SERPL-MCNC: 4.1 G/DL (ref 3.5–5)
ALBUMIN/GLOB SERPL: 1.1 RATIO (ref 1.1–2)
ALP SERPL-CCNC: 60 UNIT/L (ref 40–150)
ALT SERPL-CCNC: 11 UNIT/L (ref 0–55)
AST SERPL-CCNC: 17 UNIT/L (ref 5–34)
BASOPHILS # BLD AUTO: 0.04 X10(3)/MCL
BASOPHILS NFR BLD AUTO: 0.6 %
BILIRUB SERPL-MCNC: 0.5 MG/DL
BUN SERPL-MCNC: 11.2 MG/DL (ref 7–18.7)
CALCIUM SERPL-MCNC: 9.3 MG/DL (ref 8.4–10.2)
CHLORIDE SERPL-SCNC: 106 MMOL/L (ref 98–107)
CO2 SERPL-SCNC: 26 MMOL/L (ref 22–29)
CREAT SERPL-MCNC: 0.78 MG/DL (ref 0.55–1.02)
EOSINOPHIL # BLD AUTO: 0.08 X10(3)/MCL (ref 0–0.9)
EOSINOPHIL NFR BLD AUTO: 1.2 %
ERYTHROCYTE [DISTWIDTH] IN BLOOD BY AUTOMATED COUNT: 13.4 % (ref 11.5–17)
FERRITIN SERPL-MCNC: 425.4 NG/ML (ref 4.63–204)
GFR SERPLBLD CREATININE-BSD FMLA CKD-EPI: >60 MLS/MIN/1.73/M2
GLOBULIN SER-MCNC: 3.7 GM/DL (ref 2.4–3.5)
GLUCOSE SERPL-MCNC: 81 MG/DL (ref 74–100)
HCT VFR BLD AUTO: 47 % (ref 37–47)
HGB BLD-MCNC: 15.5 G/DL (ref 12–16)
IMM GRANULOCYTES # BLD AUTO: 0.01 X10(3)/MCL (ref 0–0.04)
IMM GRANULOCYTES NFR BLD AUTO: 0.2 %
IRON SATN MFR SERPL: 33 % (ref 20–50)
IRON SERPL-MCNC: 78 UG/DL (ref 50–170)
LYMPHOCYTES # BLD AUTO: 1.9 X10(3)/MCL (ref 0.6–4.6)
LYMPHOCYTES NFR BLD AUTO: 29.5 %
MCH RBC QN AUTO: 29.9 PG (ref 27–31)
MCHC RBC AUTO-ENTMCNC: 33 G/DL (ref 33–36)
MCV RBC AUTO: 90.7 FL (ref 80–94)
MONOCYTES # BLD AUTO: 0.55 X10(3)/MCL (ref 0.1–1.3)
MONOCYTES NFR BLD AUTO: 8.5 %
NEUTROPHILS # BLD AUTO: 3.86 X10(3)/MCL (ref 2.1–9.2)
NEUTROPHILS NFR BLD AUTO: 60 %
NRBC BLD AUTO-RTO: 0 %
PLATELET # BLD AUTO: 277 X10(3)/MCL (ref 130–400)
PMV BLD AUTO: 10.4 FL (ref 7.4–10.4)
POTASSIUM SERPL-SCNC: 4.2 MMOL/L (ref 3.5–5.1)
PROT SERPL-MCNC: 7.8 GM/DL (ref 6.4–8.3)
RBC # BLD AUTO: 5.18 X10(6)/MCL (ref 4.2–5.4)
SODIUM SERPL-SCNC: 139 MMOL/L (ref 136–145)
TIBC SERPL-MCNC: 160 UG/DL (ref 70–310)
TIBC SERPL-MCNC: 238 UG/DL (ref 250–450)
TRANSFERRIN SERPL-MCNC: 215 MG/DL (ref 180–382)
WBC # SPEC AUTO: 6.44 X10(3)/MCL (ref 4.5–11.5)

## 2023-12-20 PROCEDURE — 3079F DIAST BP 80-89 MM HG: CPT | Mod: CPTII,,, | Performed by: NURSE PRACTITIONER

## 2023-12-20 PROCEDURE — 3008F BODY MASS INDEX DOCD: CPT | Mod: CPTII,,, | Performed by: NURSE PRACTITIONER

## 2023-12-20 PROCEDURE — 82728 ASSAY OF FERRITIN: CPT

## 2023-12-20 PROCEDURE — 1159F MED LIST DOCD IN RCRD: CPT | Mod: CPTII,,, | Performed by: NURSE PRACTITIONER

## 2023-12-20 PROCEDURE — 3008F PR BODY MASS INDEX (BMI) DOCUMENTED: ICD-10-PCS | Mod: CPTII,,, | Performed by: NURSE PRACTITIONER

## 2023-12-20 PROCEDURE — 1159F PR MEDICATION LIST DOCUMENTED IN MEDICAL RECORD: ICD-10-PCS | Mod: CPTII,,, | Performed by: NURSE PRACTITIONER

## 2023-12-20 PROCEDURE — 85025 COMPLETE CBC W/AUTO DIFF WBC: CPT

## 2023-12-20 PROCEDURE — 3074F SYST BP LT 130 MM HG: CPT | Mod: CPTII,,, | Performed by: NURSE PRACTITIONER

## 2023-12-20 PROCEDURE — 99214 OFFICE O/P EST MOD 30 MIN: CPT | Mod: S$PBB,,, | Performed by: NURSE PRACTITIONER

## 2023-12-20 PROCEDURE — 3079F PR MOST RECENT DIASTOLIC BLOOD PRESSURE 80-89 MM HG: ICD-10-PCS | Mod: CPTII,,, | Performed by: NURSE PRACTITIONER

## 2023-12-20 PROCEDURE — 3074F PR MOST RECENT SYSTOLIC BLOOD PRESSURE < 130 MM HG: ICD-10-PCS | Mod: CPTII,,, | Performed by: NURSE PRACTITIONER

## 2023-12-20 PROCEDURE — 99214 PR OFFICE/OUTPT VISIT, EST, LEVL IV, 30-39 MIN: ICD-10-PCS | Mod: S$PBB,,, | Performed by: NURSE PRACTITIONER

## 2023-12-20 PROCEDURE — 36415 COLL VENOUS BLD VENIPUNCTURE: CPT

## 2023-12-20 PROCEDURE — 80053 COMPREHEN METABOLIC PANEL: CPT

## 2023-12-20 PROCEDURE — 83550 IRON BINDING TEST: CPT

## 2023-12-20 PROCEDURE — 99214 OFFICE O/P EST MOD 30 MIN: CPT | Mod: PBBFAC | Performed by: NURSE PRACTITIONER

## 2023-12-20 NOTE — PROGRESS NOTES
Chief Complaint   Iron deficiency anemia      Problem list:  1. History of microcytic anemia with suggestion of iron deficiency on partial iron studies  2. Monoclonal gammopathy    Current Treatment:  Feraheme prn    Treatment History:  Feraheme x2 (2019)  Feraheme 510 mg IV x2 (06/15/2020, 2020)  Feraheme 510mg IV x2 (2020 & 2020)  Feraheme 510mg IV x2 (2022  & 2022)      Past medical history: Anemia. Anxiety. Allergic rhinitis. Benign cyst of breast. Cataracts. Constipation. Depression. Exercise intolerance. GERD. History of intracranial hemorrhage. Memory loss. Headaches. Hemorrhoids. Hypertension. Insomnia. Peptic ulcer disease. 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 Peptic ulcer. Bilateral mammoplasty, augmentation, silicone implants, and mastopexy 2017.  x2. Cholecystectomy.  Social history: Single. Lives in [place]. Has 2 children. Currently, unemployed. Used to work for the [employer]. Denies history of tobacco, alcohol, or illicit drug abuse.  Family history: Father experienced pancreatic cancer at age 56; used to smoke. Father is a sister experienced colon cancer in her 30s.  Health maintenance: 10/25/2018: Screening mammogram: No evidence of malignancy.  Menstrual history: Has menstrual cycles once or twice a month; does relate history of blood clots with menstrual flow.    History of present illness:  For details, please see Dr. Leach's last note dated 10/08/2020. Please also refer to assessment and plan section.    Interval History 2023: Patient presents to clinic today for a 8 week follow up visit for ongoing management of JAQUELINE.. Her appointment is scheduled 2 weeks earlier than recommended follow up. She presents alone.   Received IV iron on 10/31/2023 and 2023. States tolerated well.   She reports feeling well today. She had a IUD placed the   week of November. Reports decreased bleeding with menses since IUD placed.   Has chronic fatigue, reports as a little better since receiving IV iron.   She denies any headaches, chest pain, heart palpitations, SOB, abdominal pain or bleeding outside of menses.   Reports appetite as great. No unintentional weight loss.     Review of Systems: A complete 12-point ROS was performed in full with pertinent positives as described in interval history. Remainder of ROS done in full and are negative.       Lab Results   Component Value Date    WBC 6.44 12/20/2023    RBC 5.18 12/20/2023    HGB 15.5 12/20/2023    HCT 47.0 12/20/2023    MCV 90.7 12/20/2023    MCH 29.9 12/20/2023    MCHC 33.0 12/20/2023    RDW 13.4 12/20/2023     12/20/2023    MPV 10.4 12/20/2023    GRAN 3.3 07/16/2023    GRAN 57.3 07/16/2023    LYMPH 1.9 07/16/2023    LYMPH 33.5 07/16/2023    MONO 0.4 07/16/2023    MONO 6.7 07/16/2023    EOS 0.1 07/16/2023    BASO 0.04 07/16/2023    EOSINOPHIL 1.6 07/16/2023    BASOPHIL 0.7 07/16/2023     CMP  Sodium   Date Value Ref Range Status   07/16/2023 136 136 - 145 mmol/L Final     Sodium Level   Date Value Ref Range Status   12/20/2023 139 136 - 145 mmol/L Final     Potassium   Date Value Ref Range Status   07/16/2023 3.4 (L) 3.5 - 5.1 mmol/L Final     Comment:     Anion Gap reference range revised on 4/28/2023     Potassium Level   Date Value Ref Range Status   12/20/2023 4.2 3.5 - 5.1 mmol/L Final     Chloride   Date Value Ref Range Status   07/16/2023 104 95 - 110 mmol/L Final     CO2   Date Value Ref Range Status   07/16/2023 21 (L) 22 - 31 mmol/L Final     Carbon Dioxide   Date Value Ref Range Status   12/20/2023 26 22 - 29 mmol/L Final     Glucose   Date Value Ref Range Status   07/16/2023 129 (H) 70 - 110 mg/dL Final     Comment:     The ADA recommends the following guidelines for fasting glucose:    Normal:       less than 100 mg/dL    Prediabetes:  100 mg/dL to 125 mg/dL    Diabetes:     126 mg/dL or  higher       BUN   Date Value Ref Range Status   07/16/2023 14 7 - 18 mg/dL Final     Blood Urea Nitrogen   Date Value Ref Range Status   12/20/2023 11.2 7.0 - 18.7 mg/dL Final     Creatinine   Date Value Ref Range Status   12/20/2023 0.78 0.55 - 1.02 mg/dL Final   07/16/2023 0.57 0.50 - 1.40 mg/dL Final     Calcium   Date Value Ref Range Status   07/16/2023 8.4 8.4 - 10.2 mg/dL Final     Calcium Level Total   Date Value Ref Range Status   12/20/2023 9.3 8.4 - 10.2 mg/dL Final     Total Protein   Date Value Ref Range Status   07/16/2023 7.4 6.0 - 8.4 g/dL Final     Albumin   Date Value Ref Range Status   07/16/2023 4.2 3.5 - 5.2 g/dL Final     Albumin Level   Date Value Ref Range Status   12/20/2023 4.1 3.5 - 5.0 g/dL Final     Total Bilirubin   Date Value Ref Range Status   07/16/2023 0.4 0.2 - 1.3 mg/dL Final     Bilirubin Total   Date Value Ref Range Status   12/20/2023 0.5 <=1.5 mg/dL Final     Alkaline Phosphatase   Date Value Ref Range Status   12/20/2023 60 40 - 150 unit/L Final   07/16/2023 66 38 - 145 U/L Final     AST   Date Value Ref Range Status   07/16/2023 34 14 - 36 U/L Final     Aspartate Aminotransferase   Date Value Ref Range Status   12/20/2023 17 5 - 34 unit/L Final     ALT   Date Value Ref Range Status   07/16/2023 23 0 - 35 U/L Final     Alanine Aminotransferase   Date Value Ref Range Status   12/20/2023 11 0 - 55 unit/L Final     Anion Gap   Date Value Ref Range Status   07/16/2023 11 5 - 12 mmol/L Final     Comment:     Anion Gap reference range revised on 4/28/2023     eGFR   Date Value Ref Range Status   12/20/2023 >60 mls/min/1.73/m2 Final   07/16/2023 >60 >60 mL/min/1.73 m^2 Final       Lab Results   Component Value Date    UIBC 160 12/20/2023    IRON 78 12/20/2023    TRANS 215 12/20/2023    TIBC 238 (L) 12/20/2023    LABIRON 33 12/20/2023      Lab Results   Component Value Date    FERRITIN 79.60 10/24/2023       Physical Exam:   VITALS: Blood pressure 128/88, pulse 72, temperature 97.6  "°F (36.4 °C), temperature source Oral, resp. rate 18, height 5' 2" (1.575 m), weight 67.1 kg (148 lb), SpO2 98 %.  General: Neatly groomed. Appears well. NAD.   Eye: Pupils are equal, round and reactive to light, Extraocular movements are intact. Normal conjunctiva  HENT: Normocephalic. Oropharynx moist and clear. No petechiae  Neck: Supple, Non-tender  Respiratory: Respirations are non-labored, Symmetrical chest wall expansion. Breath sounds CTA bilaterally  Cardiovascular: Regular rate, rhythm, Normal peripheral perfusion, No bilateral lower extremity edema  Gastrointestinal: Soft, non-tender. No distention. Present bowel sounds   Musculoskeletal: Moves all extremities  Integumentary: Intact. Warm, dry. No rashes, or lesions to visible skin  Neurologic: No focal deficits  Psychiatric: Cooperative. Appropriate mood and affect      Assessment / Plan   1. Iron deficiency anemia   -Severe iron deficiency anemia  -Iron pills caused constipation which caused bleeding hemorrhoids (2018)  -No response to oral iron therapy  -No GI source of bleeding (EGD and colonoscopy: 05/2019)  -Feraheme x2 (06/2019), with good response  -Deficiency recurred 11/2019 but hemoglobin normal  -06/05/2020: Ferritin 3.7, dropped; hemoglobin 9.5 (down from 14.4 on 11/14/2090)  -Feraheme 510 mg IV x2 (06/15/2020, 06/22/2020)  -Iron deficiency and anemia dramatically resolved (07/20/2020)  -Capsule endoscopy (08/20/2020): Negative  -10/07/2020: Remains iron deficient but hemoglobin is normal (transferrin saturation 10.2%, ferritin 10.1, hemoglobin 14)  (Ferritin was 117 on 07/20/2020)  -12/07/2020: Remains iron deficient but hemoglobin remains normal (serum iron 24; TIBC 359; transferrin saturation 7%; ferritin 11.9, was 10.1 on 10/07/2020, 117 on 07/20/2020; hemoglobin 13.4)  Feraheme 510mg IV x2 (12/16/2022, 12/28/2022)  -12/20/2023 - Feraheme 510 mg IV x 2 (10/31/2023 and 11/07/2023). Hgb 15.5, Iron 78, Ferritin 425.40, iron sat 33%.      2. " MGUS (monoclonal gammopathy of unknown significance)   -IgG kappa monoclonal gammopathy (MGUS)  -Diagnosed 04/2018  -Stable as of 11/2019  -05/18/2020, 06/05/2020: Stable  -06/05/2020: 0.3 g/dL IgG kappa monoclonal spike, stable since 11/2019   Patient has low risk IgG kappa monoclonal gammopathy (MGUS).  Repeat paraproteinemia labs in 1 year (05/2021).  -6/16/2021: M spike 0.47g/dL; K/L ratio WNL. No urine M spike.    # History of noncompliance with follow-up appointments with various specialties    # Thyroid mass/nodule, in need of biopsy (ultrasound 03/12/2019)  Ultrasound (03/12/2019): 3 cm solid nodule lower right thyroid  -Thyroid nodule biopsy scheduled for 01/13/2020  -01/13/2020: Right thyroid nodule, ultrasound-guided FNA: Benign follicular nodule with cystic degeneration    # Health maintenance:  -12/05/2019: Screening mammogram: No evidence of malignancy; BI-RADS 2: Benign  -Screening mammogram in 12/16/2020 - no mammographic concerns for malignancy     Plan:  -Status post Feraheme x2 (06/2020), with dramatic resolution of iron deficiency and anemia  -No GI source of bleeding (EGD, colonoscopy, capsule endoscopy)  -10/07/2020: Hemoglobin normal but iron deficiency has recurred since July 2020  -12/07/2020: Hemoglobin remains normal but iron deficiency persists  -Failed oral iron therapy in the past  -IV iron 10/31 and 11/07/2023. Iron stores repleted.     RTC in 3 months with NP,  Via TM   Cbc, cmp iron panel and ferritin done prior   Follow up with OB-gyn as directed.   Annual MMG due 1/2024

## 2024-01-10 RX ORDER — LORATADINE 10 MG/1
10 TABLET ORAL DAILY
Qty: 30 TABLET | Refills: 5 | Status: SHIPPED | OUTPATIENT
Start: 2024-01-10

## 2024-01-11 ENCOUNTER — TELEPHONE (OUTPATIENT)
Dept: GYNECOLOGY | Facility: CLINIC | Age: 49
End: 2024-01-11
Payer: MEDICAID

## 2024-01-11 NOTE — TELEPHONE ENCOUNTER
----- Message from Madelin Paz sent at 1/11/2024 11:28 AM CST -----  Regarding: blister near private area  Above pt is calling because she has a blister again near her private area. She says its itchy. She has had this issue in the past and was prescribed antibiotics and would like that again   Patient states that phone call was mistake, she meant to call Dr Kumar in [place]

## 2024-02-06 RX ORDER — METOPROLOL TARTRATE 25 MG/1
25 TABLET, FILM COATED ORAL 2 TIMES DAILY
Qty: 60 TABLET | Refills: 2 | Status: SHIPPED | OUTPATIENT
Start: 2024-02-06 | End: 2024-05-18

## 2024-03-12 NOTE — TELEPHONE ENCOUNTER
----- Message from Xiao Wang sent at 8/11/2022  9:35 AM CDT -----  Regarding: RE: pt request return call  Pt called clinic to speak to a nurse. Pt has questions regarding contraception.       ----- Message -----  From: Essie Treviño MD  Sent: 8/11/2022   7:22 AM CDT  To: Xiao Wang, [facility]  Subject: RE: pt request return call                       Dasia Hagen,     Can you please clarify?     Thanks,     Dr. Treviño     ----- Message -----  From: Xiao Wang  Sent: 8/9/2022   2:39 PM CDT  To: [facility]  Subject: pt request return call                           Pt request SN to call for consult         Yes-Patient/Caregiver accepts free interpretation services...

## 2024-03-25 RX ORDER — PANTOPRAZOLE SODIUM 40 MG/1
40 TABLET, DELAYED RELEASE ORAL
Qty: 30 TABLET | Refills: 1 | Status: SHIPPED | OUTPATIENT
Start: 2024-03-25 | End: 2024-04-30 | Stop reason: SDUPTHER

## 2024-03-25 RX ORDER — FLUOXETINE 10 MG/1
10 CAPSULE ORAL 2 TIMES DAILY
Qty: 60 CAPSULE | Refills: 1 | Status: SHIPPED | OUTPATIENT
Start: 2024-03-25 | End: 2024-06-07

## 2024-03-27 ENCOUNTER — LAB VISIT (OUTPATIENT)
Dept: HEMATOLOGY/ONCOLOGY | Facility: CLINIC | Age: 49
End: 2024-03-27
Payer: MEDICAID

## 2024-03-27 DIAGNOSIS — D50.0 IRON DEFICIENCY ANEMIA SECONDARY TO BLOOD LOSS (CHRONIC): ICD-10-CM

## 2024-03-27 LAB
ALBUMIN SERPL-MCNC: 3.6 G/DL (ref 3.5–5)
ALBUMIN/GLOB SERPL: 1.3 RATIO (ref 1.1–2)
ALP SERPL-CCNC: 51 UNIT/L (ref 40–150)
ALT SERPL-CCNC: 11 UNIT/L (ref 0–55)
AST SERPL-CCNC: 16 UNIT/L (ref 5–34)
BASOPHILS # BLD AUTO: 0.04 X10(3)/MCL
BASOPHILS NFR BLD AUTO: 0.8 %
BILIRUB SERPL-MCNC: 0.6 MG/DL
BUN SERPL-MCNC: 8.7 MG/DL (ref 7–18.7)
CALCIUM SERPL-MCNC: 9.2 MG/DL (ref 8.4–10.2)
CHLORIDE SERPL-SCNC: 110 MMOL/L (ref 98–107)
CO2 SERPL-SCNC: 24 MMOL/L (ref 22–29)
CREAT SERPL-MCNC: 0.8 MG/DL (ref 0.55–1.02)
EOSINOPHIL # BLD AUTO: 0.11 X10(3)/MCL (ref 0–0.9)
EOSINOPHIL NFR BLD AUTO: 2.1 %
ERYTHROCYTE [DISTWIDTH] IN BLOOD BY AUTOMATED COUNT: 11.9 % (ref 11.5–17)
FERRITIN SERPL-MCNC: 267.68 NG/ML (ref 4.63–204)
GFR SERPLBLD CREATININE-BSD FMLA CKD-EPI: >60 MLS/MIN/1.73/M2
GLOBULIN SER-MCNC: 2.8 GM/DL (ref 2.4–3.5)
GLUCOSE SERPL-MCNC: 88 MG/DL (ref 74–100)
HCT VFR BLD AUTO: 40.7 % (ref 37–47)
HGB BLD-MCNC: 14.2 G/DL (ref 12–16)
IMM GRANULOCYTES # BLD AUTO: 0.01 X10(3)/MCL (ref 0–0.04)
IMM GRANULOCYTES NFR BLD AUTO: 0.2 %
IRON SATN MFR SERPL: 54 % (ref 20–50)
IRON SERPL-MCNC: 113 UG/DL (ref 50–170)
LYMPHOCYTES # BLD AUTO: 1.3 X10(3)/MCL (ref 0.6–4.6)
LYMPHOCYTES NFR BLD AUTO: 24.4 %
MCH RBC QN AUTO: 32.3 PG (ref 27–31)
MCHC RBC AUTO-ENTMCNC: 34.9 G/DL (ref 33–36)
MCV RBC AUTO: 92.7 FL (ref 80–94)
MONOCYTES # BLD AUTO: 0.37 X10(3)/MCL (ref 0.1–1.3)
MONOCYTES NFR BLD AUTO: 7 %
NEUTROPHILS # BLD AUTO: 3.49 X10(3)/MCL (ref 2.1–9.2)
NEUTROPHILS NFR BLD AUTO: 65.5 %
NRBC BLD AUTO-RTO: 0 %
PLATELET # BLD AUTO: 258 X10(3)/MCL (ref 130–400)
PMV BLD AUTO: 10.7 FL (ref 7.4–10.4)
POTASSIUM SERPL-SCNC: 4.2 MMOL/L (ref 3.5–5.1)
PROT SERPL-MCNC: 6.4 GM/DL (ref 6.4–8.3)
RBC # BLD AUTO: 4.39 X10(6)/MCL (ref 4.2–5.4)
SODIUM SERPL-SCNC: 140 MMOL/L (ref 136–145)
TIBC SERPL-MCNC: 209 UG/DL (ref 250–450)
TIBC SERPL-MCNC: 96 UG/DL (ref 70–310)
TRANSFERRIN SERPL-MCNC: 172 MG/DL (ref 180–382)
WBC # SPEC AUTO: 5.32 X10(3)/MCL (ref 4.5–11.5)

## 2024-03-27 PROCEDURE — 80053 COMPREHEN METABOLIC PANEL: CPT

## 2024-03-27 PROCEDURE — 82728 ASSAY OF FERRITIN: CPT

## 2024-03-27 PROCEDURE — 83540 ASSAY OF IRON: CPT

## 2024-03-27 PROCEDURE — 85025 COMPLETE CBC W/AUTO DIFF WBC: CPT

## 2024-03-27 PROCEDURE — 36415 COLL VENOUS BLD VENIPUNCTURE: CPT

## 2024-04-09 ENCOUNTER — OFFICE VISIT (OUTPATIENT)
Dept: HEMATOLOGY/ONCOLOGY | Facility: CLINIC | Age: 49
End: 2024-04-09
Payer: MEDICAID

## 2024-04-09 DIAGNOSIS — D50.0 IRON DEFICIENCY ANEMIA SECONDARY TO BLOOD LOSS (CHRONIC): Primary | ICD-10-CM

## 2024-04-09 DIAGNOSIS — D47.2 MONOCLONAL GAMMOPATHY OF UNKNOWN SIGNIFICANCE (MGUS): ICD-10-CM

## 2024-04-09 PROCEDURE — 1159F MED LIST DOCD IN RCRD: CPT | Mod: CPTII,95,, | Performed by: NURSE PRACTITIONER

## 2024-04-09 PROCEDURE — 99213 OFFICE O/P EST LOW 20 MIN: CPT | Mod: 95,,, | Performed by: NURSE PRACTITIONER

## 2024-04-09 PROCEDURE — 1160F RVW MEDS BY RX/DR IN RCRD: CPT | Mod: CPTII,95,, | Performed by: NURSE PRACTITIONER

## 2024-04-09 NOTE — PROGRESS NOTES
Chief Complaint   Iron deficiency anemia      Problem list:  1. History of microcytic anemia with suggestion of iron deficiency on partial iron studies  2. Monoclonal gammopathy    Current Treatment:  Feraheme prn    Treatment History:  Feraheme x2 (2019)  Feraheme 510 mg IV x2 (06/15/2020, 2020)  Feraheme 510mg IV x2 (2020 & 2020)  Feraheme 510mg IV x2 (2022  & 2022)      Past medical history: Anemia. Anxiety. Allergic rhinitis. Benign cyst of breast. Cataracts. Constipation. Depression. Exercise intolerance. GERD. History of intracranial hemorrhage. Memory loss. Headaches. Hemorrhoids. Hypertension. Insomnia. Peptic ulcer disease. 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 Peptic ulcer. Bilateral mammoplasty, augmentation, silicone implants, and mastopexy 2017.  x2. Cholecystectomy.  Social history: Single. Lives in [place]. Has 2 children. Currently, unemployed. Used to work for the [employer]. Denies history of tobacco, alcohol, or illicit drug abuse.  Family history: Father experienced pancreatic cancer at age 56; used to smoke. Father is a sister experienced colon cancer in her 30s.  Health maintenance: 10/25/2018: Screening mammogram: No evidence of malignancy.  Menstrual history: Has menstrual cycles once or twice a month; does relate history of blood clots with menstrual flow.    History of present illness:  For details, please see Dr. Leach's last note dated 10/08/2020. Please also refer to assessment and plan section.    Interval History 2024: Patient presents to clinic via telemedicine audio only for a scheduled follow-up visit.  Patient is being seen by [facility] iron-deficiency anemia.  Patient's last iron infusion was in November of last year.  Since last iron infusion patient's iron levels have been stable.She denies any headaches, chest pain,  heart palpitations, SOB, abdominal pain or bleeding outside of menses.  Lab work reviewed with the patient which was completed on 3/27/24.  Discussed plan of care and follow-up appointments with the patient.    This is a telemedicine note. Patient was treated using telemedicine, real time audio and video, according to [facility] protocols. I, distant provider, conducted the visit from location identified below. The patient participated in the visit at a non-[facility] location selected by the patient (or patient's representative), identified below. I am licensed in the state where the patient stated they are located. The patient (or patient's representative) stated that they understood and accepted the privacy and security risks to their information at their location. Patient was located at her/his residence in , [place]. I, distant provider, was located at [facility].    Face to face time spent with patient exceeds 15 minutes, over 50% of which was used for education and counseling regarding medical conditions, current medications including risk/benefit and side effects/adverse events, over the counter medications-uses/doses, home self-care and contact precautions, and red flags and indications for immediate medical attention. The patient is receptive, expresses understanding and is agreeable to plan. All questions answered.       Lab Results   Component Value Date    WBC 5.32 03/27/2024    RBC 4.39 03/27/2024    HGB 14.2 03/27/2024    HCT 40.7 03/27/2024    MCV 92.7 03/27/2024    MCH 32.3 (H) 03/27/2024    MCHC 34.9 03/27/2024    RDW 11.9 03/27/2024     03/27/2024    MPV 10.7 (H) 03/27/2024    GRAN 3.3 07/16/2023    GRAN 57.3 07/16/2023    LYMPH 1.9 07/16/2023    LYMPH 33.5 07/16/2023    MONO 0.4 07/16/2023    MONO 6.7 07/16/2023    EOS 0.1 07/16/2023    BASO 0.04 07/16/2023    EOSINOPHIL 1.6 07/16/2023    BASOPHIL 0.7 07/16/2023     CMP  Sodium   Date Value Ref Range Status   07/16/2023 136 136 - 145 mmol/L Final     Sodium  Level   Date Value Ref Range Status   03/27/2024 140 136 - 145 mmol/L Final     Potassium   Date Value Ref Range Status   07/16/2023 3.4 (L) 3.5 - 5.1 mmol/L Final     Comment:     Anion Gap reference range revised on 4/28/2023     Potassium Level   Date Value Ref Range Status   03/27/2024 4.2 3.5 - 5.1 mmol/L Final     Chloride   Date Value Ref Range Status   07/16/2023 104 95 - 110 mmol/L Final     CO2   Date Value Ref Range Status   07/16/2023 21 (L) 22 - 31 mmol/L Final     Carbon Dioxide   Date Value Ref Range Status   03/27/2024 24 22 - 29 mmol/L Final     Glucose   Date Value Ref Range Status   07/16/2023 129 (H) 70 - 110 mg/dL Final     Comment:     The ADA recommends the following guidelines for fasting glucose:    Normal:       less than 100 mg/dL    Prediabetes:  100 mg/dL to 125 mg/dL    Diabetes:     126 mg/dL or higher       BUN   Date Value Ref Range Status   07/16/2023 14 7 - 18 mg/dL Final     Blood Urea Nitrogen   Date Value Ref Range Status   03/27/2024 8.7 7.0 - 18.7 mg/dL Final     Creatinine   Date Value Ref Range Status   03/27/2024 0.80 0.55 - 1.02 mg/dL Final   07/16/2023 0.57 0.50 - 1.40 mg/dL Final     Calcium   Date Value Ref Range Status   07/16/2023 8.4 8.4 - 10.2 mg/dL Final     Calcium Level Total   Date Value Ref Range Status   03/27/2024 9.2 8.4 - 10.2 mg/dL Final     Total Protein   Date Value Ref Range Status   07/16/2023 7.4 6.0 - 8.4 g/dL Final     Albumin   Date Value Ref Range Status   07/16/2023 4.2 3.5 - 5.2 g/dL Final     Albumin Level   Date Value Ref Range Status   03/27/2024 3.6 3.5 - 5.0 g/dL Final     Total Bilirubin   Date Value Ref Range Status   07/16/2023 0.4 0.2 - 1.3 mg/dL Final     Bilirubin Total   Date Value Ref Range Status   03/27/2024 0.6 <=1.5 mg/dL Final     Alkaline Phosphatase   Date Value Ref Range Status   03/27/2024 51 40 - 150 unit/L Final   07/16/2023 66 38 - 145 U/L Final     AST   Date Value Ref Range Status   07/16/2023 34 14 - 36 U/L Final      Aspartate Aminotransferase   Date Value Ref Range Status   03/27/2024 16 5 - 34 unit/L Final     ALT   Date Value Ref Range Status   07/16/2023 23 0 - 35 U/L Final     Alanine Aminotransferase   Date Value Ref Range Status   03/27/2024 11 0 - 55 unit/L Final     Anion Gap   Date Value Ref Range Status   07/16/2023 11 5 - 12 mmol/L Final     Comment:     Anion Gap reference range revised on 4/28/2023     eGFR   Date Value Ref Range Status   03/27/2024 >60 mls/min/1.73/m2 Final   07/16/2023 >60 >60 mL/min/1.73 m^2 Final       Lab Results   Component Value Date    UIBC 96 03/27/2024    IRON 113 03/27/2024    TRANS 172 (L) 03/27/2024    TIBC 209 (L) 03/27/2024    LABIRON 54 (H) 03/27/2024      Lab Results   Component Value Date    FERRITIN 267.68 (H) 03/27/2024         Assessment / Plan   1. Iron deficiency anemia   -Severe iron deficiency anemia  -Iron pills caused constipation which caused bleeding hemorrhoids (2018)  -No response to oral iron therapy  -No GI source of bleeding (EGD and colonoscopy: 05/2019)  -Feraheme x2 (06/2019), with good response  -Deficiency recurred 11/2019 but hemoglobin normal  -06/05/2020: Ferritin 3.7, dropped; hemoglobin 9.5 (down from 14.4 on 11/14/2090)  -Feraheme 510 mg IV x2 (06/15/2020, 06/22/2020)  -Iron deficiency and anemia dramatically resolved (07/20/2020)  -Capsule endoscopy (08/20/2020): Negative  -10/07/2020: Remains iron deficient but hemoglobin is normal (transferrin saturation 10.2%, ferritin 10.1, hemoglobin 14)  (Ferritin was 117 on 07/20/2020)  -12/07/2020: Remains iron deficient but hemoglobin remains normal (serum iron 24; TIBC 359; transferrin saturation 7%; ferritin 11.9, was 10.1 on 10/07/2020, 117 on 07/20/2020; hemoglobin 13.4)  Feraheme 510mg IV x2 (12/16/2022, 12/28/2022)  -12/20/2023 - Feraheme 510 mg IV x 2 (10/31/2023 and 11/07/2023). Hgb 15.5, Iron 78, Ferritin 425.40, iron sat 33%.      2. MGUS (monoclonal gammopathy of unknown significance)   -IgG  kappa monoclonal gammopathy (MGUS)  -Diagnosed 04/2018  -Stable as of 11/2019  -05/18/2020, 06/05/2020: Stable  -06/05/2020: 0.3 g/dL IgG kappa monoclonal spike, stable since 11/2019   Patient has low risk IgG kappa monoclonal gammopathy (MGUS).  Repeat paraproteinemia labs in 1 year (05/2021).  -6/16/2021: M spike 0.47g/dL; K/L ratio WNL. No urine M spike.    # History of noncompliance with follow-up appointments with various specialties    # Thyroid mass/nodule, in need of biopsy (ultrasound 03/12/2019)  Ultrasound (03/12/2019): 3 cm solid nodule lower right thyroid  -Thyroid nodule biopsy scheduled for 01/13/2020  -01/13/2020: Right thyroid nodule, ultrasound-guided FNA: Benign follicular nodule with cystic degeneration    # Health maintenance:  -12/05/2019: Screening mammogram: No evidence of malignancy; BI-RADS 2: Benign  -Screening mammogram in 12/16/2020 - no mammographic concerns for malignancy     Plan:  -Status post Feraheme x2 (06/2020), with dramatic resolution of iron deficiency and anemia  -No GI source of bleeding (EGD, colonoscopy, capsule endoscopy)  -10/07/2020: Hemoglobin normal but iron deficiency has recurred since July 2020  -12/07/2020: Hemoglobin remains normal but iron deficiency persists  -Failed oral iron therapy in the past  -IV iron 10/31 and 11/07/2023. Iron stores repleted.     Follow up with NP in 3 months with lab work (CBC, CMP, iron panel, ferritin)  Follow up with OB-gyn as directed.

## 2024-04-24 ENCOUNTER — OFFICE VISIT (OUTPATIENT)
Dept: FAMILY MEDICINE | Facility: CLINIC | Age: 49
End: 2024-04-24
Payer: MEDICAID

## 2024-04-24 VITALS
TEMPERATURE: 98 F | HEART RATE: 79 BPM | OXYGEN SATURATION: 100 % | RESPIRATION RATE: 20 BRPM | SYSTOLIC BLOOD PRESSURE: 107 MMHG | BODY MASS INDEX: 24.04 KG/M2 | HEIGHT: 62 IN | DIASTOLIC BLOOD PRESSURE: 76 MMHG | WEIGHT: 130.63 LBS

## 2024-04-24 DIAGNOSIS — Z12.31 SCREENING MAMMOGRAM FOR BREAST CANCER: ICD-10-CM

## 2024-04-24 DIAGNOSIS — R53.83 FATIGUE, UNSPECIFIED TYPE: ICD-10-CM

## 2024-04-24 DIAGNOSIS — I10 HYPERTENSION, UNSPECIFIED TYPE: Primary | ICD-10-CM

## 2024-04-24 DIAGNOSIS — Z23 IMMUNIZATION DUE: ICD-10-CM

## 2024-04-24 DIAGNOSIS — E04.1 THYROID NODULE: ICD-10-CM

## 2024-04-24 DIAGNOSIS — R06.83 SNORING: ICD-10-CM

## 2024-04-24 DIAGNOSIS — R10.2 PELVIC PAIN: ICD-10-CM

## 2024-04-24 DIAGNOSIS — R91.1 PULMONARY NODULE: ICD-10-CM

## 2024-04-24 DIAGNOSIS — J30.9 ALLERGIC RHINITIS, UNSPECIFIED SEASONALITY, UNSPECIFIED TRIGGER: ICD-10-CM

## 2024-04-24 DIAGNOSIS — E78.5 HYPERLIPIDEMIA, UNSPECIFIED HYPERLIPIDEMIA TYPE: ICD-10-CM

## 2024-04-24 DIAGNOSIS — Z12.11 SCREEN FOR COLON CANCER: ICD-10-CM

## 2024-04-24 LAB
AMORPH URATE CRY URNS QL MICRO: ABNORMAL /UL
APPEARANCE UR: ABNORMAL
BACTERIA #/AREA URNS AUTO: ABNORMAL /HPF
BASOPHILS # BLD AUTO: 0.03 X10(3)/MCL
BASOPHILS NFR BLD AUTO: 0.6 %
BILIRUB UR QL STRIP.AUTO: NEGATIVE
CHOLEST SERPL-MCNC: 170 MG/DL
CHOLEST/HDLC SERPL: 3 {RATIO} (ref 0–5)
COLOR UR AUTO: YELLOW
EOSINOPHIL # BLD AUTO: 0.07 X10(3)/MCL (ref 0–0.9)
EOSINOPHIL NFR BLD AUTO: 1.5 %
ERYTHROCYTE [DISTWIDTH] IN BLOOD BY AUTOMATED COUNT: 12.1 % (ref 11.5–17)
FERRITIN SERPL-MCNC: 166.76 NG/ML (ref 4.63–204)
GLUCOSE UR QL STRIP.AUTO: NORMAL
HCT VFR BLD AUTO: 41 % (ref 37–47)
HDLC SERPL-MCNC: 49 MG/DL (ref 35–60)
HGB BLD-MCNC: 14.2 G/DL (ref 12–16)
HYALINE CASTS #/AREA URNS LPF: ABNORMAL /LPF
IMM GRANULOCYTES # BLD AUTO: 0.01 X10(3)/MCL (ref 0–0.04)
IMM GRANULOCYTES NFR BLD AUTO: 0.2 %
IRON SATN MFR SERPL: 36 % (ref 20–50)
IRON SERPL-MCNC: 82 UG/DL (ref 50–170)
KETONES UR QL STRIP.AUTO: NEGATIVE
LDLC SERPL CALC-MCNC: 95 MG/DL (ref 50–140)
LEUKOCYTE ESTERASE UR QL STRIP.AUTO: NEGATIVE
LYMPHOCYTES # BLD AUTO: 1.15 X10(3)/MCL (ref 0.6–4.6)
LYMPHOCYTES NFR BLD AUTO: 24 %
MCH RBC QN AUTO: 32.8 PG (ref 27–31)
MCHC RBC AUTO-ENTMCNC: 34.6 G/DL (ref 33–36)
MCV RBC AUTO: 94.7 FL (ref 80–94)
MONOCYTES # BLD AUTO: 0.44 X10(3)/MCL (ref 0.1–1.3)
MONOCYTES NFR BLD AUTO: 9.2 %
MUCOUS THREADS URNS QL MICRO: ABNORMAL /LPF
NEUTROPHILS # BLD AUTO: 3.1 X10(3)/MCL (ref 2.1–9.2)
NEUTROPHILS NFR BLD AUTO: 64.5 %
NITRITE UR QL STRIP.AUTO: NEGATIVE
NRBC BLD AUTO-RTO: 0 %
PH UR STRIP.AUTO: 7 [PH]
PLATELET # BLD AUTO: 269 X10(3)/MCL (ref 130–400)
PMV BLD AUTO: 10.8 FL (ref 7.4–10.4)
PROT UR QL STRIP.AUTO: NEGATIVE
RBC # BLD AUTO: 4.33 X10(6)/MCL (ref 4.2–5.4)
RBC #/AREA URNS AUTO: ABNORMAL /HPF
RBC UR QL AUTO: NEGATIVE
SP GR UR STRIP.AUTO: 1.02 (ref 1–1.03)
SQUAMOUS #/AREA URNS LPF: ABNORMAL /HPF
TIBC SERPL-MCNC: 146 UG/DL (ref 70–310)
TIBC SERPL-MCNC: 228 UG/DL (ref 250–450)
TRANSFERRIN SERPL-MCNC: 194 MG/DL (ref 180–382)
TRIGL SERPL-MCNC: 131 MG/DL (ref 37–140)
UROBILINOGEN UR STRIP-ACNC: NORMAL
VLDLC SERPL CALC-MCNC: 26 MG/DL
WBC # SPEC AUTO: 4.8 X10(3)/MCL (ref 4.5–11.5)
WBC #/AREA URNS AUTO: ABNORMAL /HPF

## 2024-04-24 PROCEDURE — 85025 COMPLETE CBC W/AUTO DIFF WBC: CPT

## 2024-04-24 PROCEDURE — 90471 IMMUNIZATION ADMIN: CPT | Mod: PBBFAC

## 2024-04-24 PROCEDURE — 90715 TDAP VACCINE 7 YRS/> IM: CPT | Mod: PBBFAC

## 2024-04-24 PROCEDURE — 36415 COLL VENOUS BLD VENIPUNCTURE: CPT

## 2024-04-24 PROCEDURE — 81001 URINALYSIS AUTO W/SCOPE: CPT | Performed by: FAMILY MEDICINE

## 2024-04-24 PROCEDURE — 99215 OFFICE O/P EST HI 40 MIN: CPT | Mod: PBBFAC | Performed by: FAMILY MEDICINE

## 2024-04-24 PROCEDURE — 83540 ASSAY OF IRON: CPT

## 2024-04-24 PROCEDURE — 80061 LIPID PANEL: CPT

## 2024-04-24 PROCEDURE — 82728 ASSAY OF FERRITIN: CPT

## 2024-04-24 RX ADMIN — TETANUS TOXOID, REDUCED DIPHTHERIA TOXOID AND ACELLULAR PERTUSSIS VACCINE, ADSORBED 0.5 ML: 5; 2.5; 8; 8; 2.5 SUSPENSION INTRAMUSCULAR at 02:04

## 2024-04-24 NOTE — PROGRESS NOTES
"Subjective     Patient ID: Pretty Pratt is a 49 y.o. female.    Chief Complaint: Follow-up (F/u yearly visit  ,and refills)    HPI      04/24/2024      HTN - well controlled, has been on Metorprolol for years after ICH  HLD -has been on statin holiday holiday to assess for SE, has not noticed that she feels any better without it, will jassi to establish baseline  JAQUELINE -follows hemeonc, largely resolved since IUD placed early November 11/2023  -records not available, since then,recent %sat high, will repeat  Chronic fatigue - has not resolved with treatment of JAQUELINE ,+ excessive snoring, wakes up gasping, sleeps easily when watching TV but not at redlight, BP very nl, has never been tested for MICHAEL  AR - stable with Flonase and claritin    Needs updated imaging for thyroid and PULM nodules    C/o  Lower abd midline and pelvic pain  with occ light spotting for few mos  IUD placed 5 mos ago  Not sex active  Regular menses, lighter,  but last one was little heavier  Will assess to see if IUD in place, pt will call GYN if wants to consider other option  Not wanting hyst   Denies dysuria, freq, etc    CRC screen - no FH, no prev screening,no c/o        Current Outpatient Medications   Medication Instructions    FLUoxetine 10 mg, Oral, 2 times daily    fluticasone propionate (FLONASE) 50 mcg/actuation nasal spray 1 spray, Each Nostril, Daily    latanoprost 0.005 % ophthalmic solution 1 drop, Both Eyes, Nightly    loratadine (CLARITIN) 10 mg, Oral, Daily    metoprolol tartrate (LOPRESSOR) 25 mg, Oral, 2 times daily    pantoprazole (PROTONIX) 40 mg, Oral    sucralfate (CARAFATE) 1 g, Oral, 2 times daily           PE  Vitals:    04/24/24 1316   BP: 107/76   BP Location: Right arm   Patient Position: Sitting   BP Method: Small (Automatic)   Pulse: 79   Resp: 20   Temp: 98.1 °F (36.7 °C)   TempSrc: Oral   SpO2: 100%   Weight: 59.2 kg (130 lb 9.6 oz)   Height: 5' 2" (1.575 m)     General - NAD, comfortable  HEENT- nl TM,EAC, " kolton w/o redness  Neck - no node, mass ,thyromegaly  Respiratory - CTA BL, no distress  Cardiovascular - RRR, no murmur  Gastrointestinal - soft NT, no mass, nl BS x 4 mild SP tend, no CVA tend      Assessment  1. Hypertension, unspecified type    2. Hyperlipidemia, unspecified hyperlipidemia type    3. Allergic rhinitis, unspecified seasonality, unspecified trigger    4. Fatigue, unspecified type    5. Snoring    6. Pelvic pain    7. Pulmonary nodule    8. Thyroid nodule                '    Plan  Continue current meds  LABS  Mammo  Cologuard  Thyroid US  Chest CT w/o  Sleep study referral  tdap  Rtc 6 mos    Orders Placed This Encounter    Cologuard Screening (Multitarget Stool DNA)    US Pelvis Comp with Transvag NON-OB (xpd    Mammo Digital Screening Bilat w/ Marv    US Thyroid    CT Chest Without Contrast    Iron and TIBC    Ferritin    CBC Auto Differential    Urinalysis, Reflex to Urine Culture    Lipid Panel    CBC with Differential    Ambulatory referral/consult to Sleep Disorders    Tdap (BOOSTRIX) vaccine injection 0.5 mL

## 2024-05-02 RX ORDER — PANTOPRAZOLE SODIUM 40 MG/1
40 TABLET, DELAYED RELEASE ORAL DAILY
Qty: 30 TABLET | Refills: 5 | Status: SHIPPED | OUTPATIENT
Start: 2024-05-02

## 2024-05-18 RX ORDER — METOPROLOL TARTRATE 25 MG/1
25 TABLET, FILM COATED ORAL 2 TIMES DAILY
Qty: 60 TABLET | Refills: 2 | Status: SHIPPED | OUTPATIENT
Start: 2024-05-18

## 2024-05-28 DIAGNOSIS — G47.33 OSA (OBSTRUCTIVE SLEEP APNEA): Primary | ICD-10-CM

## 2024-06-07 RX ORDER — FLUOXETINE 10 MG/1
CAPSULE ORAL
Qty: 60 CAPSULE | Refills: 5 | Status: SHIPPED | OUTPATIENT
Start: 2024-06-07

## 2024-07-08 ENCOUNTER — TELEPHONE (OUTPATIENT)
Dept: HEMATOLOGY/ONCOLOGY | Facility: CLINIC | Age: 49
End: 2024-07-08
Payer: MEDICAID

## 2024-07-31 ENCOUNTER — TELEPHONE (OUTPATIENT)
Dept: HEMATOLOGY/ONCOLOGY | Facility: CLINIC | Age: 49
End: 2024-07-31

## 2024-07-31 DIAGNOSIS — G25.81 RLS (RESTLESS LEGS SYNDROME): ICD-10-CM

## 2024-07-31 DIAGNOSIS — D50.0 IRON DEFICIENCY ANEMIA SECONDARY TO BLOOD LOSS (CHRONIC): Primary | ICD-10-CM

## 2024-08-20 ENCOUNTER — TELEPHONE (OUTPATIENT)
Dept: HEMATOLOGY/ONCOLOGY | Facility: CLINIC | Age: 49
End: 2024-08-20
Payer: MEDICAID

## 2024-08-21 ENCOUNTER — OFFICE VISIT (OUTPATIENT)
Dept: HEMATOLOGY/ONCOLOGY | Facility: CLINIC | Age: 49
End: 2024-08-21
Payer: MEDICAID

## 2024-08-21 ENCOUNTER — APPOINTMENT (OUTPATIENT)
Dept: HEMATOLOGY/ONCOLOGY | Facility: CLINIC | Age: 49
End: 2024-08-21

## 2024-08-21 VITALS
SYSTOLIC BLOOD PRESSURE: 112 MMHG | RESPIRATION RATE: 19 BRPM | HEART RATE: 78 BPM | DIASTOLIC BLOOD PRESSURE: 80 MMHG | OXYGEN SATURATION: 99 % | HEIGHT: 62 IN | TEMPERATURE: 98 F | WEIGHT: 120.19 LBS | BODY MASS INDEX: 22.12 KG/M2

## 2024-08-21 DIAGNOSIS — D50.0 IRON DEFICIENCY ANEMIA SECONDARY TO BLOOD LOSS (CHRONIC): Primary | ICD-10-CM

## 2024-08-21 DIAGNOSIS — G25.81 RLS (RESTLESS LEGS SYNDROME): ICD-10-CM

## 2024-08-21 DIAGNOSIS — D50.0 IRON DEFICIENCY ANEMIA SECONDARY TO BLOOD LOSS (CHRONIC): ICD-10-CM

## 2024-08-21 DIAGNOSIS — D47.2 MONOCLONAL GAMMOPATHY OF UNKNOWN SIGNIFICANCE (MGUS): ICD-10-CM

## 2024-08-21 LAB
ALBUMIN SERPL-MCNC: 3.8 G/DL (ref 3.5–5)
ALBUMIN/GLOB SERPL: 1.1 RATIO (ref 1.1–2)
ALP SERPL-CCNC: 60 UNIT/L (ref 40–150)
ALT SERPL-CCNC: 9 UNIT/L (ref 0–55)
ANION GAP SERPL CALC-SCNC: 7 MEQ/L
AST SERPL-CCNC: 13 UNIT/L (ref 5–34)
BASOPHILS # BLD AUTO: 0.04 X10(3)/MCL
BASOPHILS NFR BLD AUTO: 1 %
BILIRUB SERPL-MCNC: 0.5 MG/DL
BUN SERPL-MCNC: 16.1 MG/DL (ref 7–18.7)
CALCIUM SERPL-MCNC: 9.3 MG/DL (ref 8.4–10.2)
CHLORIDE SERPL-SCNC: 109 MMOL/L (ref 98–107)
CO2 SERPL-SCNC: 25 MMOL/L (ref 22–29)
CREAT SERPL-MCNC: 0.82 MG/DL (ref 0.55–1.02)
CREAT/UREA NIT SERPL: 20
EOSINOPHIL # BLD AUTO: 0.1 X10(3)/MCL (ref 0–0.9)
EOSINOPHIL NFR BLD AUTO: 2.5 %
ERYTHROCYTE [DISTWIDTH] IN BLOOD BY AUTOMATED COUNT: 11.8 % (ref 11.5–17)
FERRITIN SERPL-MCNC: 40.21 NG/ML (ref 4.63–204)
GFR SERPLBLD CREATININE-BSD FMLA CKD-EPI: >60 ML/MIN/1.73/M2
GLOBULIN SER-MCNC: 3.6 GM/DL (ref 2.4–3.5)
GLUCOSE SERPL-MCNC: 82 MG/DL (ref 74–100)
HCT VFR BLD AUTO: 42.9 % (ref 37–47)
HGB BLD-MCNC: 14.7 G/DL (ref 12–16)
IMM GRANULOCYTES # BLD AUTO: 0 X10(3)/MCL (ref 0–0.04)
IMM GRANULOCYTES NFR BLD AUTO: 0 %
IRON SATN MFR SERPL: 32 % (ref 20–50)
IRON SERPL-MCNC: 88 UG/DL (ref 50–170)
LYMPHOCYTES # BLD AUTO: 1.64 X10(3)/MCL (ref 0.6–4.6)
LYMPHOCYTES NFR BLD AUTO: 41.1 %
MCH RBC QN AUTO: 31.4 PG (ref 27–31)
MCHC RBC AUTO-ENTMCNC: 34.3 G/DL (ref 33–36)
MCV RBC AUTO: 91.7 FL (ref 80–94)
MONOCYTES # BLD AUTO: 0.42 X10(3)/MCL (ref 0.1–1.3)
MONOCYTES NFR BLD AUTO: 10.5 %
NEUTROPHILS # BLD AUTO: 1.79 X10(3)/MCL (ref 2.1–9.2)
NEUTROPHILS NFR BLD AUTO: 44.9 %
NRBC BLD AUTO-RTO: 0 %
PLATELET # BLD AUTO: 264 X10(3)/MCL (ref 130–400)
PLATELETS.RETICULATED NFR BLD AUTO: 4.6 % (ref 0.9–11.2)
PMV BLD AUTO: 10.8 FL (ref 7.4–10.4)
POTASSIUM SERPL-SCNC: 4 MMOL/L (ref 3.5–5.1)
PROT SERPL-MCNC: 7.4 GM/DL (ref 6.4–8.3)
RBC # BLD AUTO: 4.68 X10(6)/MCL (ref 4.2–5.4)
SODIUM SERPL-SCNC: 141 MMOL/L (ref 136–145)
TIBC SERPL-MCNC: 185 UG/DL (ref 70–310)
TIBC SERPL-MCNC: 273 UG/DL (ref 250–450)
TRANSFERRIN SERPL-MCNC: 236 MG/DL (ref 180–382)
WBC # BLD AUTO: 3.99 X10(3)/MCL (ref 4.5–11.5)

## 2024-08-21 PROCEDURE — 82728 ASSAY OF FERRITIN: CPT

## 2024-08-21 PROCEDURE — 36415 COLL VENOUS BLD VENIPUNCTURE: CPT

## 2024-08-21 PROCEDURE — 80053 COMPREHEN METABOLIC PANEL: CPT

## 2024-08-21 PROCEDURE — 83540 ASSAY OF IRON: CPT

## 2024-08-21 PROCEDURE — 99214 OFFICE O/P EST MOD 30 MIN: CPT | Mod: PBBFAC | Performed by: NURSE PRACTITIONER

## 2024-08-21 PROCEDURE — 99214 OFFICE O/P EST MOD 30 MIN: CPT | Mod: S$PBB,,, | Performed by: NURSE PRACTITIONER

## 2024-08-21 PROCEDURE — 85025 COMPLETE CBC W/AUTO DIFF WBC: CPT

## 2024-08-21 NOTE — PROGRESS NOTES
Chief Complaint   Iron deficiency anemia      Problem list:  1. History of microcytic anemia with suggestion of iron deficiency on partial iron studies  2. Monoclonal gammopathy    Current Treatment:  Feraheme prn    Treatment History:  Feraheme x2 (2019)  Feraheme 510 mg IV x2 (06/15/2020, 2020)  Feraheme 510mg IV x2 (2020 & 2020)  Feraheme 510mg IV x2 (2022  & 2022)      Past medical history: Anemia. Anxiety. Allergic rhinitis. Benign cyst of breast. Cataracts. Constipation. Depression. Exercise intolerance. GERD. History of intracranial hemorrhage. Memory loss. Headaches. Hemorrhoids. Hypertension. Insomnia. Peptic ulcer disease. 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 Peptic ulcer. Bilateral mammoplasty, augmentation, silicone implants, and mastopexy 2017.  x2. Cholecystectomy.  Social history: Single. Lives in [place]. Has 2 children. Currently, unemployed. Used to work for the [employer]. Denies history of tobacco, alcohol, or illicit drug abuse.  Family history: Father experienced pancreatic cancer at age 56; used to smoke. Father is a sister experienced colon cancer in her 30s.  Health maintenance: 10/25/2018: Screening mammogram: No evidence of malignancy.  Menstrual history: Has menstrual cycles once or twice a month; does relate history of blood clots with menstrual flow.    History of present illness:  For details, please see Dr. Leach's last note dated 10/08/2020. Please also refer to assessment and plan section.    Interval History 2024: Patient presents to clinic alone for a scheduled follow-up visit.  Patient is being seen by Hematology Clinic for iron-deficiency anemia.  Patient's last iron infusion was in November of last year.  2023 patient had IUD placed.  Since IUD placement patient has had very little menstrual bleeding.  Lab work  reviewed with the patient today, slightly low white blood cell count 3.99.  Patient does report recently being treated with antibiotics for bladder infection.  We discussed plan of care and follow up.     Review of Systems   All other systems reviewed and are negative.    Physical Exam  Constitutional:       Appearance: Normal appearance.   HENT:      Head: Normocephalic.      Mouth/Throat:      Mouth: Mucous membranes are moist.   Eyes:      Pupils: Pupils are equal, round, and reactive to light.   Cardiovascular:      Rate and Rhythm: Normal rate and regular rhythm.      Pulses: Normal pulses.      Heart sounds: Normal heart sounds.   Pulmonary:      Effort: Pulmonary effort is normal.      Breath sounds: Normal breath sounds.   Abdominal:      General: Bowel sounds are normal.      Palpations: Abdomen is soft.   Musculoskeletal:         General: Normal range of motion.      Cervical back: Normal range of motion.   Skin:     General: Skin is warm and dry.      Capillary Refill: Capillary refill takes less than 2 seconds.   Neurological:      General: No focal deficit present.      Mental Status: She is alert and oriented to person, place, and time.   Psychiatric:         Attention and Perception: Attention normal.         Mood and Affect: Affect normal.         Speech: Speech normal.         Behavior: Behavior normal.         Thought Content: Thought content normal.          Lab Results   Component Value Date    WBC 3.99 (L) 08/21/2024    RBC 4.68 08/21/2024    HGB 14.7 08/21/2024    HCT 42.9 08/21/2024    MCV 91.7 08/21/2024    MCH 31.4 (H) 08/21/2024    MCHC 34.3 08/21/2024    RDW 11.8 08/21/2024     08/21/2024    MPV 10.8 (H) 08/21/2024    GRAN 3.3 07/16/2023    GRAN 57.3 07/16/2023    LYMPH 1.9 07/16/2023    LYMPH 33.5 07/16/2023    MONO 0.4 07/16/2023    MONO 6.7 07/16/2023    EOS 0.1 07/16/2023    BASO 0.04 07/16/2023    EOSINOPHIL 1.6 07/16/2023    BASOPHIL 0.7 07/16/2023     CMP  Sodium   Date Value  Ref Range Status   08/21/2024 141 136 - 145 mmol/L Final   07/16/2023 136 136 - 145 mmol/L Final     Potassium   Date Value Ref Range Status   08/21/2024 4.0 3.5 - 5.1 mmol/L Final   07/16/2023 3.4 (L) 3.5 - 5.1 mmol/L Final     Comment:     Anion Gap reference range revised on 4/28/2023     Chloride   Date Value Ref Range Status   08/21/2024 109 (H) 98 - 107 mmol/L Final   07/16/2023 104 95 - 110 mmol/L Final     CO2   Date Value Ref Range Status   08/21/2024 25 22 - 29 mmol/L Final   07/16/2023 21 (L) 22 - 31 mmol/L Final     Glucose   Date Value Ref Range Status   07/16/2023 129 (H) 70 - 110 mg/dL Final     Comment:     The ADA recommends the following guidelines for fasting glucose:    Normal:       less than 100 mg/dL    Prediabetes:  100 mg/dL to 125 mg/dL    Diabetes:     126 mg/dL or higher       BUN   Date Value Ref Range Status   07/16/2023 14 7 - 18 mg/dL Final     Blood Urea Nitrogen   Date Value Ref Range Status   08/21/2024 16.1 7.0 - 18.7 mg/dL Final     Creatinine   Date Value Ref Range Status   08/21/2024 0.82 0.55 - 1.02 mg/dL Final   07/16/2023 0.57 0.50 - 1.40 mg/dL Final     Calcium   Date Value Ref Range Status   08/21/2024 9.3 8.4 - 10.2 mg/dL Final   07/16/2023 8.4 8.4 - 10.2 mg/dL Final     Total Protein   Date Value Ref Range Status   07/16/2023 7.4 6.0 - 8.4 g/dL Final     Albumin   Date Value Ref Range Status   08/21/2024 3.8 3.5 - 5.0 g/dL Final   07/16/2023 4.2 3.5 - 5.2 g/dL Final     Total Bilirubin   Date Value Ref Range Status   07/16/2023 0.4 0.2 - 1.3 mg/dL Final     Bilirubin Total   Date Value Ref Range Status   08/21/2024 0.5 <=1.5 mg/dL Final     Alkaline Phosphatase   Date Value Ref Range Status   07/16/2023 66 38 - 145 U/L Final     ALP   Date Value Ref Range Status   08/21/2024 60 40 - 150 unit/L Final     AST   Date Value Ref Range Status   08/21/2024 13 5 - 34 unit/L Final   07/16/2023 34 14 - 36 U/L Final     ALT   Date Value Ref Range Status   08/21/2024 9 0 - 55 unit/L  Final   07/16/2023 23 0 - 35 U/L Final     Anion Gap   Date Value Ref Range Status   07/16/2023 11 5 - 12 mmol/L Final     Comment:     Anion Gap reference range revised on 4/28/2023     eGFR   Date Value Ref Range Status   08/21/2024 >60 mL/min/1.73/m2 Final   07/16/2023 >60 >60 mL/min/1.73 m^2 Final       Lab Results   Component Value Date    UIBC 185 08/21/2024    IRON 88 08/21/2024    TRANSFERRIN 236 08/21/2024    TIBC 273 08/21/2024    LABIRON 32 08/21/2024      Lab Results   Component Value Date    FERRITIN 40.21 08/21/2024         Assessment / Plan   1. Iron deficiency anemia   -Severe iron deficiency anemia  -Iron pills caused constipation which caused bleeding hemorrhoids (2018)  -No response to oral iron therapy  -No GI source of bleeding (EGD and colonoscopy: 05/2019)  -Feraheme x2 (06/2019), with good response  -Deficiency recurred 11/2019 but hemoglobin normal  -06/05/2020: Ferritin 3.7, dropped; hemoglobin 9.5 (down from 14.4 on 11/14/2090)  -Feraheme 510 mg IV x2 (06/15/2020, 06/22/2020)  -Iron deficiency and anemia dramatically resolved (07/20/2020)  -Capsule endoscopy (08/20/2020): Negative  -10/07/2020: Remains iron deficient but hemoglobin is normal (transferrin saturation 10.2%, ferritin 10.1, hemoglobin 14)  (Ferritin was 117 on 07/20/2020)  -12/07/2020: Remains iron deficient but hemoglobin remains normal (serum iron 24; TIBC 359; transferrin saturation 7%; ferritin 11.9, was 10.1 on 10/07/2020, 117 on 07/20/2020; hemoglobin 13.4)  Feraheme 510mg IV x2 (12/16/2022, 12/28/2022)  -12/20/2023 - Feraheme 510 mg IV x 2 (10/31/2023 and 11/07/2023). Hgb 15.5, Iron 78, Ferritin 425.40, iron sat 33%.      2. MGUS (monoclonal gammopathy of unknown significance)   -IgG kappa monoclonal gammopathy (MGUS)  -Diagnosed 04/2018  -Stable as of 11/2019  -05/18/2020, 06/05/2020: Stable  -06/05/2020: 0.3 g/dL IgG kappa monoclonal spike, stable since 11/2019   Patient has low risk IgG kappa monoclonal gammopathy  (MGUS).  Repeat paraproteinemia labs in 1 year (05/2021).  -6/16/2021: M spike 0.47g/dL; K/L ratio WNL. No urine M spike.    # History of noncompliance with follow-up appointments with various specialties    # Thyroid mass/nodule, in need of biopsy (ultrasound 03/12/2019)  Ultrasound (03/12/2019): 3 cm solid nodule lower right thyroid  -Thyroid nodule biopsy scheduled for 01/13/2020  -01/13/2020: Right thyroid nodule, ultrasound-guided FNA: Benign follicular nodule with cystic degeneration    # Health maintenance:  -12/05/2019: Screening mammogram: No evidence of malignancy; BI-RADS 2: Benign  -Screening mammogram in 12/16/2020 - no mammographic concerns for malignancy     Plan:  -Status post Feraheme x2 (06/2020), with dramatic resolution of iron deficiency and anemia  -No GI source of bleeding (EGD, colonoscopy, capsule endoscopy)  -10/07/2020: Hemoglobin normal but iron deficiency has recurred since July 2020  -12/07/2020: Hemoglobin remains normal but iron deficiency persists  -Failed oral iron therapy in the past  -IV iron 10/31 and 11/07/2023. Iron stores repleted.     Follow up with NP in 3 months with lab work (CBC, CMP, iron panel, ferritin)  Follow up with OB-gyn

## 2024-09-03 RX ORDER — METOPROLOL TARTRATE 25 MG/1
25 TABLET, FILM COATED ORAL 2 TIMES DAILY
Qty: 60 TABLET | Refills: 0 | Status: SHIPPED | OUTPATIENT
Start: 2024-09-03

## 2024-09-27 RX ORDER — METOPROLOL TARTRATE 25 MG/1
25 TABLET, FILM COATED ORAL 2 TIMES DAILY
Qty: 60 TABLET | Refills: 0 | Status: SHIPPED | OUTPATIENT
Start: 2024-09-27

## 2024-11-01 RX ORDER — LORATADINE 10 MG/1
10 TABLET ORAL
Qty: 30 TABLET | Refills: 2 | Status: SHIPPED | OUTPATIENT
Start: 2024-11-01

## 2024-11-10 RX ORDER — METOPROLOL TARTRATE 25 MG/1
25 TABLET, FILM COATED ORAL 2 TIMES DAILY
Qty: 60 TABLET | Refills: 0 | Status: SHIPPED | OUTPATIENT
Start: 2024-11-10

## 2024-12-09 ENCOUNTER — TELEPHONE (OUTPATIENT)
Dept: HEMATOLOGY/ONCOLOGY | Facility: CLINIC | Age: 49
End: 2024-12-09
Payer: MEDICAID

## 2024-12-13 RX ORDER — METOPROLOL TARTRATE 25 MG/1
25 TABLET, FILM COATED ORAL 2 TIMES DAILY
Qty: 60 TABLET | Refills: 0 | Status: SHIPPED | OUTPATIENT
Start: 2024-12-13

## 2024-12-16 DIAGNOSIS — D50.0 IRON DEFICIENCY ANEMIA SECONDARY TO BLOOD LOSS (CHRONIC): Primary | ICD-10-CM

## 2024-12-18 ENCOUNTER — TELEPHONE (OUTPATIENT)
Dept: HEMATOLOGY/ONCOLOGY | Facility: CLINIC | Age: 49
End: 2024-12-18
Payer: MEDICAID

## 2024-12-19 ENCOUNTER — APPOINTMENT (OUTPATIENT)
Dept: HEMATOLOGY/ONCOLOGY | Facility: CLINIC | Age: 49
End: 2024-12-19
Payer: MEDICAID

## 2024-12-19 ENCOUNTER — OFFICE VISIT (OUTPATIENT)
Dept: HEMATOLOGY/ONCOLOGY | Facility: CLINIC | Age: 49
End: 2024-12-19
Payer: MEDICAID

## 2024-12-19 VITALS
HEART RATE: 89 BPM | DIASTOLIC BLOOD PRESSURE: 87 MMHG | SYSTOLIC BLOOD PRESSURE: 131 MMHG | TEMPERATURE: 98 F | HEIGHT: 62 IN | RESPIRATION RATE: 20 BRPM | BODY MASS INDEX: 24.29 KG/M2 | WEIGHT: 132 LBS | OXYGEN SATURATION: 99 %

## 2024-12-19 DIAGNOSIS — D50.0 IRON DEFICIENCY ANEMIA SECONDARY TO BLOOD LOSS (CHRONIC): ICD-10-CM

## 2024-12-19 DIAGNOSIS — D47.2 MONOCLONAL GAMMOPATHY OF UNKNOWN SIGNIFICANCE (MGUS): Primary | ICD-10-CM

## 2024-12-19 LAB
ALBUMIN SERPL-MCNC: 3.6 G/DL (ref 3.5–5)
ALBUMIN/GLOB SERPL: 1 RATIO (ref 1.1–2)
ALP SERPL-CCNC: 68 UNIT/L (ref 40–150)
ALT SERPL-CCNC: 14 UNIT/L (ref 0–55)
ANION GAP SERPL CALC-SCNC: 5 MEQ/L
AST SERPL-CCNC: 21 UNIT/L (ref 5–34)
BASOPHILS # BLD AUTO: 0.03 X10(3)/MCL
BASOPHILS NFR BLD AUTO: 0.6 %
BILIRUB SERPL-MCNC: 0.3 MG/DL
BUN SERPL-MCNC: 6.8 MG/DL (ref 7–18.7)
CALCIUM SERPL-MCNC: 8.6 MG/DL (ref 8.4–10.2)
CHLORIDE SERPL-SCNC: 105 MMOL/L (ref 98–107)
CO2 SERPL-SCNC: 27 MMOL/L (ref 22–29)
CREAT SERPL-MCNC: 0.76 MG/DL (ref 0.55–1.02)
CREAT/UREA NIT SERPL: 9
EOSINOPHIL # BLD AUTO: 0.18 X10(3)/MCL (ref 0–0.9)
EOSINOPHIL NFR BLD AUTO: 3.8 %
ERYTHROCYTE [DISTWIDTH] IN BLOOD BY AUTOMATED COUNT: 12.7 % (ref 11.5–17)
FERRITIN SERPL-MCNC: 30.97 NG/ML (ref 4.63–204)
GFR SERPLBLD CREATININE-BSD FMLA CKD-EPI: >60 ML/MIN/1.73/M2
GLOBULIN SER-MCNC: 3.7 GM/DL (ref 2.4–3.5)
GLUCOSE SERPL-MCNC: 79 MG/DL (ref 74–100)
HCT VFR BLD AUTO: 42.6 % (ref 37–47)
HGB BLD-MCNC: 14.2 G/DL (ref 12–16)
IMM GRANULOCYTES # BLD AUTO: 0.01 X10(3)/MCL (ref 0–0.04)
IMM GRANULOCYTES NFR BLD AUTO: 0.2 %
IRON SATN MFR SERPL: 23 % (ref 20–50)
IRON SERPL-MCNC: 67 UG/DL (ref 50–170)
LYMPHOCYTES # BLD AUTO: 1.85 X10(3)/MCL (ref 0.6–4.6)
LYMPHOCYTES NFR BLD AUTO: 38.9 %
MCH RBC QN AUTO: 30.1 PG (ref 27–31)
MCHC RBC AUTO-ENTMCNC: 33.3 G/DL (ref 33–36)
MCV RBC AUTO: 90.4 FL (ref 80–94)
MONOCYTES # BLD AUTO: 0.36 X10(3)/MCL (ref 0.1–1.3)
MONOCYTES NFR BLD AUTO: 7.6 %
NEUTROPHILS # BLD AUTO: 2.32 X10(3)/MCL (ref 2.1–9.2)
NEUTROPHILS NFR BLD AUTO: 48.9 %
NRBC BLD AUTO-RTO: 0 %
PLATELET # BLD AUTO: 234 X10(3)/MCL (ref 130–400)
PMV BLD AUTO: 10.5 FL (ref 7.4–10.4)
POTASSIUM SERPL-SCNC: 3.9 MMOL/L (ref 3.5–5.1)
PROT SERPL-MCNC: 7.3 GM/DL (ref 6.4–8.3)
RBC # BLD AUTO: 4.71 X10(6)/MCL (ref 4.2–5.4)
SODIUM SERPL-SCNC: 137 MMOL/L (ref 136–145)
TIBC SERPL-MCNC: 219 UG/DL (ref 70–310)
TIBC SERPL-MCNC: 286 UG/DL (ref 250–450)
TRANSFERRIN SERPL-MCNC: 251 MG/DL (ref 180–382)
WBC # BLD AUTO: 4.75 X10(3)/MCL (ref 4.5–11.5)

## 2024-12-19 PROCEDURE — 83540 ASSAY OF IRON: CPT

## 2024-12-19 PROCEDURE — 36415 COLL VENOUS BLD VENIPUNCTURE: CPT

## 2024-12-19 PROCEDURE — 80053 COMPREHEN METABOLIC PANEL: CPT

## 2024-12-19 PROCEDURE — 99213 OFFICE O/P EST LOW 20 MIN: CPT | Mod: PBBFAC | Performed by: NURSE PRACTITIONER

## 2024-12-19 PROCEDURE — 85025 COMPLETE CBC W/AUTO DIFF WBC: CPT

## 2024-12-19 PROCEDURE — 82728 ASSAY OF FERRITIN: CPT

## 2024-12-19 NOTE — PROGRESS NOTES
Chief Complaint   Iron deficiency anemia      Problem list:  1. History of microcytic anemia with suggestion of iron deficiency on partial iron studies  2. Monoclonal gammopathy    Current Treatment:  Feraheme prn    Treatment History:  Feraheme x2 (2019)  Feraheme 510 mg IV x2 (06/15/2020, 2020)  Feraheme 510mg IV x2 (2020 & 2020)  Feraheme 510mg IV x2 (2022  & 2022)      Past medical history: Anemia. Anxiety. Allergic rhinitis. Benign cyst of breast. Cataracts. Constipation. Depression. Exercise intolerance. GERD. History of intracranial hemorrhage. Memory loss. Headaches. Hemorrhoids. Hypertension. Insomnia. Peptic ulcer disease. 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 Peptic ulcer. Bilateral mammoplasty, augmentation, silicone implants, and mastopexy 2017.  x2. Cholecystectomy.  Social history: Single. Lives in [place]. Has 2 children. Currently, unemployed. Used to work for the [employer]. Denies history of tobacco, alcohol, or illicit drug abuse.  Family history: Father experienced pancreatic cancer at age 56; used to smoke. Father is a sister experienced colon cancer in her 30s.  Health maintenance: 10/25/2018: Screening mammogram: No evidence of malignancy.  Menstrual history: Has menstrual cycles once or twice a month; does relate history of blood clots with menstrual flow.    History of present illness:  For details, please see Dr. Leach's last note dated 10/08/2020. Please also refer to assessment and plan section.    Interval History 2024: Patient presents to clinic alone for a scheduled follow-up visit.  Patient is being seen by Hematology Clinic for iron-deficiency anemia.  Patient's last iron infusion was in November of last year.  2023 patient had IUD placed.  Since IUD placement patient has had very little menstrual bleeding.  Lab work  reviewed with the patient today, slightly low white blood cell count 3.99.  Patient does report recently being treated with antibiotics for bladder infection.  We discussed plan of care and follow up.  Interval History 12/19/2024:  On today's visit the patient presents to the office for follow-up and management of her history of iron-deficiency anemia requiring iron infusions and MGUS.  On today's visit the patient denies any new or worsening bone pain, she denies any unexplained weight loss or fatigue, she denies any recurrent infection or fever, neuropathy symptoms or bleeding from anywhere in her body.  The patient denies any symptoms suggestive of amyloidosis such as swelling, shortness of breath or changes in the skin texture     Review of Systems   All other systems reviewed and are negative.    Physical Exam  Constitutional:       Appearance: Normal appearance.   HENT:      Head: Normocephalic.      Mouth/Throat:      Mouth: Mucous membranes are moist.   Eyes:      Pupils: Pupils are equal, round, and reactive to light.   Cardiovascular:      Rate and Rhythm: Normal rate and regular rhythm.      Pulses: Normal pulses.      Heart sounds: Normal heart sounds.   Pulmonary:      Effort: Pulmonary effort is normal.      Breath sounds: Normal breath sounds.   Abdominal:      General: Bowel sounds are normal.      Palpations: Abdomen is soft.   Musculoskeletal:         General: Normal range of motion.      Cervical back: Normal range of motion.   Skin:     General: Skin is warm and dry.      Capillary Refill: Capillary refill takes less than 2 seconds.   Neurological:      General: No focal deficit present.      Mental Status: She is alert and oriented to person, place, and time.   Psychiatric:         Attention and Perception: Attention normal.         Mood and Affect: Affect normal.         Speech: Speech normal.         Behavior: Behavior normal.         Thought Content: Thought content normal.          Lab  Results   Component Value Date    WBC 3.99 (L) 08/21/2024    RBC 4.68 08/21/2024    HGB 14.7 08/21/2024    HCT 42.9 08/21/2024    MCV 91.7 08/21/2024    MCH 31.4 (H) 08/21/2024    MCHC 34.3 08/21/2024    RDW 11.8 08/21/2024     08/21/2024    MPV 10.8 (H) 08/21/2024    GRAN 3.3 07/16/2023    GRAN 57.3 07/16/2023    LYMPH 1.9 07/16/2023    LYMPH 33.5 07/16/2023    MONO 0.4 07/16/2023    MONO 6.7 07/16/2023    EOS 0.1 07/16/2023    BASO 0.04 07/16/2023    EOSINOPHIL 1.6 07/16/2023    BASOPHIL 0.7 07/16/2023     CMP  Sodium   Date Value Ref Range Status   08/21/2024 141 136 - 145 mmol/L Final   07/16/2023 136 136 - 145 mmol/L Final     Potassium   Date Value Ref Range Status   08/21/2024 4.0 3.5 - 5.1 mmol/L Final   07/16/2023 3.4 (L) 3.5 - 5.1 mmol/L Final     Comment:     Anion Gap reference range revised on 4/28/2023     Chloride   Date Value Ref Range Status   08/21/2024 109 (H) 98 - 107 mmol/L Final   07/16/2023 104 95 - 110 mmol/L Final     CO2   Date Value Ref Range Status   08/21/2024 25 22 - 29 mmol/L Final   07/16/2023 21 (L) 22 - 31 mmol/L Final     Glucose   Date Value Ref Range Status   07/16/2023 129 (H) 70 - 110 mg/dL Final     Comment:     The ADA recommends the following guidelines for fasting glucose:    Normal:       less than 100 mg/dL    Prediabetes:  100 mg/dL to 125 mg/dL    Diabetes:     126 mg/dL or higher       BUN   Date Value Ref Range Status   07/16/2023 14 7 - 18 mg/dL Final     Blood Urea Nitrogen   Date Value Ref Range Status   08/21/2024 16.1 7.0 - 18.7 mg/dL Final     Creatinine   Date Value Ref Range Status   08/21/2024 0.82 0.55 - 1.02 mg/dL Final   07/16/2023 0.57 0.50 - 1.40 mg/dL Final     Calcium   Date Value Ref Range Status   08/21/2024 9.3 8.4 - 10.2 mg/dL Final   07/16/2023 8.4 8.4 - 10.2 mg/dL Final     Total Protein   Date Value Ref Range Status   07/16/2023 7.4 6.0 - 8.4 g/dL Final     Albumin   Date Value Ref Range Status   08/21/2024 3.8 3.5 - 5.0 g/dL Final    07/16/2023 4.2 3.5 - 5.2 g/dL Final     Total Bilirubin   Date Value Ref Range Status   07/16/2023 0.4 0.2 - 1.3 mg/dL Final     Bilirubin Total   Date Value Ref Range Status   08/21/2024 0.5 <=1.5 mg/dL Final     Alkaline Phosphatase   Date Value Ref Range Status   07/16/2023 66 38 - 145 U/L Final     ALP   Date Value Ref Range Status   08/21/2024 60 40 - 150 unit/L Final     AST   Date Value Ref Range Status   08/21/2024 13 5 - 34 unit/L Final   07/16/2023 34 14 - 36 U/L Final     ALT   Date Value Ref Range Status   08/21/2024 9 0 - 55 unit/L Final   07/16/2023 23 0 - 35 U/L Final     Anion Gap   Date Value Ref Range Status   07/16/2023 11 5 - 12 mmol/L Final     Comment:     Anion Gap reference range revised on 4/28/2023     eGFR   Date Value Ref Range Status   08/21/2024 >60 mL/min/1.73/m2 Final   07/16/2023 >60 >60 mL/min/1.73 m^2 Final       Lab Results   Component Value Date    UIBC 185 08/21/2024    IRON 88 08/21/2024    TRANSFERRIN 236 08/21/2024    TIBC 273 08/21/2024    LABIRON 32 08/21/2024      Lab Results   Component Value Date    FERRITIN 40.21 08/21/2024         Assessment / Plan   1. Iron deficiency anemia   -Severe iron deficiency anemia  -Iron pills caused constipation which caused bleeding hemorrhoids (2018)  -No response to oral iron therapy  -No GI source of bleeding (EGD and colonoscopy: 05/2019)  -Feraheme x2 (06/2019), with good response  -Deficiency recurred 11/2019 but hemoglobin normal  -06/05/2020: Ferritin 3.7, dropped; hemoglobin 9.5 (down from 14.4 on 11/14/2090)  -Feraheme 510 mg IV x2 (06/15/2020, 06/22/2020)  -Iron deficiency and anemia dramatically resolved (07/20/2020)  -Capsule endoscopy (08/20/2020): Negative  -10/07/2020: Remains iron deficient but hemoglobin is normal (transferrin saturation 10.2%, ferritin 10.1, hemoglobin 14)  (Ferritin was 117 on 07/20/2020)  -12/07/2020: Remains iron deficient but hemoglobin remains normal (serum iron 24; TIBC 359; transferrin  saturation 7%; ferritin 11.9, was 10.1 on 10/07/2020, 117 on 07/20/2020; hemoglobin 13.4)  Feraheme 510mg IV x2 (12/16/2022, 12/28/2022)  -12/20/2023 - Feraheme 510 mg IV x 2 (10/31/2023 and 11/07/2023). Hgb 15.5, Iron 78, Ferritin 425.40, iron sat 33%.      2. MGUS (monoclonal gammopathy of unknown significance)   -IgG kappa monoclonal gammopathy (MGUS)  -Diagnosed 04/2018  -Stable as of 11/2019  -05/18/2020, 06/05/2020: Stable  -06/05/2020: 0.3 g/dL IgG kappa monoclonal spike, stable since 11/2019   Patient has low risk IgG kappa monoclonal gammopathy (MGUS).  Repeat paraproteinemia labs in 1 year (05/2021).  -6/16/2021: M spike 0.47g/dL; K/L ratio WNL. No urine M spike.    # History of noncompliance with follow-up appointments with various specialties    # Thyroid mass/nodule, in need of biopsy (ultrasound 03/12/2019)  Ultrasound (03/12/2019): 3 cm solid nodule lower right thyroid  -Thyroid nodule biopsy scheduled for 01/13/2020  -01/13/2020: Right thyroid nodule, ultrasound-guided FNA: Benign follicular nodule with cystic degeneration    # Health maintenance:  -12/05/2019: Screening mammogram: No evidence of malignancy; BI-RADS 2: Benign  -Screening mammogram in 12/16/2020 - no mammographic concerns for malignancy     Plan:  -Status post Feraheme x2 (06/2020), with dramatic resolution of iron deficiency and anemia  -No GI source of bleeding (EGD, colonoscopy, capsule endoscopy)  -10/07/2020: Hemoglobin normal but iron deficiency has recurred since July 2020  -12/07/2020: Hemoglobin remains normal but iron deficiency persists  -Failed oral iron therapy in the past  -IV iron 10/31 and 11/07/2023. Iron stores repleted.     Follow up with NP in 3 months with lab work (CBC, CMP, iron panel, ferritin)  Follow up with OB-gyn     Discussion & Plan   December 19, 2024    1.Dx:  History of iron-deficiency anemia  I reviewed with the patient laboratory evaluation today the shows an iron saturation of 23% with a completely  "normal iron profile and a hemoglobin = 14.2.  The ferritin level however is 30.9 which is consistent with iron-deficiency  We will repeat labs at the patient's next visit.  If she has become anemic we may need to proceed with IV iron again.  She has failed oral iron in the past  2.Dx:  Monoclonal gammopathy of uncertain significance  Laboratory evaluation for monoclonal gammopathy are being done once a year.  She will be due in May again.  The patient however denies any new or worsening bone pain, weight loss, fever or infections, any symptoms of neuropathy such as tingling or numbness in the extremities, any symptoms concerning for amyloidosis such as swelling shortness of breath or changes in the skin texture.  In May we will do labs 1 week before the appointment so we can have the results prior to seeing the patient.  -Patient seems to have IgG MGUS kappa type, her last M spike = 0.15, at baseline he was 0.47 g/dL, kappa lambda ratio was 1.13  Since the patient's serum and protein is less than 1.5, she has an IgG MGUS, and she does not have an abnormal serum free light change ratio she is considered to have a low risk MGUS which has an absolute risk for disease progression over 20 years of 5%  The risk of this disease advancing to another pre-malignant state and from there to a malignant state increases 1% per year   Patient does not have any clinical red flags such as unexplained:  ?Bone pain  ?Fatigue/generalized weakness  ?Constitutional "B" symptoms (unintentional weight loss, fever, night sweats)  ?Neurologic symptoms (neuropathy, headache, dizziness, loss of vision/hearing)  ?Bleeding  ?Symptoms suggestive of amyloidosis   ? there is no Lymphadenopathy, hepatomegaly, or splenomegaly  -Patient does not have any laboratory red flags such as:  ?Anemia, elevated creatinine, hypercalcemia  ?Increase in serum M-protein level >=50 percent   ?Increase in involved serum FLC level by >=50 percent         Patient " instructions and visit orders.     -Follow-up:  Follow-up in six-month- June 19, 2025  -Labs:  CBC, CMP, LDH, iron, ferritin, SPEP, kappa lambda, immunofixation prior to next visit- June 12, 2025  -Imaging:    -Other orders:

## 2024-12-19 NOTE — Clinical Note
-Follow-up: · Follow-up in six-month- June 19, 2025 -Labs: · CBC, CMP, LDH, iron, ferritin, SPEP, kappa lambda, immunofixation prior to next visit- June 12, 2025

## 2025-01-10 ENCOUNTER — HOSPITAL ENCOUNTER (OUTPATIENT)
Dept: RADIOLOGY | Facility: HOSPITAL | Age: 50
Discharge: HOME | End: 2025-01-10
Attending: FAMILY MEDICINE
Payer: MEDICAID

## 2025-01-10 ENCOUNTER — OFFICE VISIT (OUTPATIENT)
Dept: FAMILY MEDICINE | Facility: CLINIC | Age: 50
End: 2025-01-10
Payer: MEDICAID

## 2025-01-10 VITALS
HEIGHT: 62 IN | HEART RATE: 69 BPM | OXYGEN SATURATION: 99 % | TEMPERATURE: 98 F | DIASTOLIC BLOOD PRESSURE: 83 MMHG | SYSTOLIC BLOOD PRESSURE: 116 MMHG | BODY MASS INDEX: 23.93 KG/M2 | WEIGHT: 130.06 LBS

## 2025-01-10 DIAGNOSIS — R05.9 COUGH, UNSPECIFIED TYPE: ICD-10-CM

## 2025-01-10 DIAGNOSIS — Z12.11 SCREEN FOR COLON CANCER: ICD-10-CM

## 2025-01-10 DIAGNOSIS — J30.9 ALLERGIC RHINITIS, UNSPECIFIED SEASONALITY, UNSPECIFIED TRIGGER: ICD-10-CM

## 2025-01-10 DIAGNOSIS — Z12.31 SCREENING MAMMOGRAM FOR BREAST CANCER: ICD-10-CM

## 2025-01-10 DIAGNOSIS — I10 HYPERTENSION, UNSPECIFIED TYPE: ICD-10-CM

## 2025-01-10 DIAGNOSIS — K21.9 GASTROESOPHAGEAL REFLUX DISEASE, UNSPECIFIED WHETHER ESOPHAGITIS PRESENT: ICD-10-CM

## 2025-01-10 DIAGNOSIS — J32.9 SINUSITIS, UNSPECIFIED CHRONICITY, UNSPECIFIED LOCATION: ICD-10-CM

## 2025-01-10 DIAGNOSIS — J01.90 ACUTE SINUSITIS WITH SYMPTOMS GREATER THAN 10 DAYS: Primary | ICD-10-CM

## 2025-01-10 PROCEDURE — 99215 OFFICE O/P EST HI 40 MIN: CPT | Mod: PBBFAC,25 | Performed by: FAMILY MEDICINE

## 2025-01-10 PROCEDURE — 71046 X-RAY EXAM CHEST 2 VIEWS: CPT | Mod: TC

## 2025-01-10 PROCEDURE — 96372 THER/PROPH/DIAG INJ SC/IM: CPT | Mod: PBBFAC

## 2025-01-10 RX ORDER — LORATADINE 10 MG/1
10 TABLET ORAL DAILY
Qty: 90 TABLET | Refills: 1 | Status: SHIPPED | OUTPATIENT
Start: 2025-01-10

## 2025-01-10 RX ORDER — BETAMETHASONE SODIUM PHOSPHATE AND BETAMETHASONE ACETATE 3; 3 MG/ML; MG/ML
6 INJECTION, SUSPENSION INTRA-ARTICULAR; INTRALESIONAL; INTRAMUSCULAR; SOFT TISSUE
Status: COMPLETED | OUTPATIENT
Start: 2025-01-10 | End: 2025-01-13

## 2025-01-10 RX ORDER — FLUOXETINE 10 MG/1
10 CAPSULE ORAL DAILY
Qty: 90 CAPSULE | Refills: 1 | Status: SHIPPED | OUTPATIENT
Start: 2025-01-10

## 2025-01-10 RX ORDER — PANTOPRAZOLE SODIUM 40 MG/1
40 TABLET, DELAYED RELEASE ORAL DAILY
Qty: 90 TABLET | Refills: 1 | Status: SHIPPED | OUTPATIENT
Start: 2025-01-10

## 2025-01-10 RX ORDER — BENZONATATE 200 MG/1
200 CAPSULE ORAL 3 TIMES DAILY PRN
Qty: 30 CAPSULE | Refills: 1 | Status: SHIPPED | OUTPATIENT
Start: 2025-01-10

## 2025-01-10 RX ORDER — METOPROLOL TARTRATE 25 MG/1
25 TABLET, FILM COATED ORAL 2 TIMES DAILY
Qty: 180 TABLET | Refills: 1 | Status: SHIPPED | OUTPATIENT
Start: 2025-01-10

## 2025-01-10 RX ORDER — BENZONATATE 200 MG/1
200 CAPSULE ORAL 3 TIMES DAILY PRN
COMMUNITY
End: 2025-01-10

## 2025-01-10 RX ORDER — CEFDINIR 300 MG/1
300 CAPSULE ORAL 2 TIMES DAILY
Qty: 20 CAPSULE | Refills: 0 | Status: SHIPPED | OUTPATIENT
Start: 2025-01-10 | End: 2025-01-20

## 2025-01-10 RX ORDER — METHYLPREDNISOLONE 4 MG/1
TABLET ORAL
COMMUNITY
Start: 2025-01-04 | End: 2025-01-10

## 2025-01-10 RX ORDER — AZITHROMYCIN 250 MG/1
250 TABLET, FILM COATED ORAL DAILY
COMMUNITY
End: 2025-01-10

## 2025-01-10 RX ORDER — FLUTICASONE PROPIONATE 50 MCG
2 SPRAY, SUSPENSION (ML) NASAL DAILY
Qty: 48 G | Refills: 1 | Status: SHIPPED | OUTPATIENT
Start: 2025-01-10

## 2025-01-10 NOTE — PROGRESS NOTES
"Subjective     Patient ID: Pretty Pratt is a 49 y.o. female.    Chief Complaint: Follow-up (Follow up with PCP for recent dx of bronchitis)  Also overdue for check up    HPI  Overall doing well, improved, living in  and working at the donut shop, traveling to the [facility] to see fly in couple of weeks and afraid she will not be better    C/o:   Cough - started with sinus pressure, nasal  congestion,  ST, green sinus drainage, fever,  altered smell 1 month ago, has NP cough, was seen in urgent care and rx Zpak, medrol 1/6, Tessalon (helps PM cough), no  SOB, wheezing ,afraid she has bronchitis      HTN - controlled, adherent to Lopressor which she has tolerated well for yrs, may consider weaning in future    AR - stable and well controlled with Flonase/Claritin    GERD - well controlled with Protonix daily, has h/o ulcer, should continue, no dysphagia    JAQUELINE - improved, stable since received IUD from GYN in , they did PAP, LMP about 3 weeks  ago and light         Current Outpatient Medications   Medication Instructions    FLUoxetine 10 MG capsule TAKE 1 CAPSULE(10 MG) BY MOUTH TWICE DAILY    fluticasone propionate (FLONASE) 50 mcg/actuation nasal spray 1 spray, Daily    latanoprost 0.005 % ophthalmic solution 1 drop, Nightly    loratadine (CLARITIN) 10 mg, Oral    metoprolol tartrate (LOPRESSOR) 25 mg, Oral, 2 times daily    pantoprazole (PROTONIX) 40 mg, Oral, Daily         ROS  Constitutional: no fever, chills  Respiratory: no  wheezing, SOB  Cardiovascular: no CP, palpitations, edema  Gastrointestinal: no NVD, ABD pain, blood in stool, melena  Genitourinary: no dysuria, frequency, urgency, hematuria      PE  Vitals:    01/10/25 1148   BP: 116/83   BP Location: Right arm   Patient Position: Sitting   Pulse: 69   Temp: 98.2 °F (36.8 °C)   TempSrc: Oral   SpO2: 99%   Weight: 59 kg (130 lb 1.1 oz)   Height: 5' 2" (1.575 m)     General - NAD, comfortable  HEENT-  TM - bulging clear eff BL,EAC -no " redness/swelling, kolton w/o redness/drip    Neck - no adenopathy, mass ,thyromegaly  Respiratory - CTA BL, no distress  Cardiovascular - RRR, no murmur  Gastrointestinal - soft NT, no mass, nl BS x 4   Musculoskeletal - no joint swelling or inflammation, no tend, moves well  Neurologic - no focal deficits, nl CN, nl gait and balance    Assessment  1. Acute sinusitis with symptoms greater than 10 days    2. Cough, unspecified type    3. Hypertension, unspecified type    4. Allergic rhinitis, unspecified seasonality, unspecified trigger    5. Gastroesophageal reflux disease, unspecified whether esophagitis present                    Plan  Continue current meds with any changes listed below  Omnicef, refill tessalon  Celestone 1 cc IM  CXR  Mammo - pt wants to do at [facility], order given  Screening cscope  Flu shot when better at local pharmacy  RTC 4 MOS    Orders Placed This Encounter    X-Ray Chest PA And Lateral    Mammo Digital Screening Bilat w/ Marv    benzonatate (TESSALON) 200 MG capsule    betamethasone acetate-betamethasone sodium phosphate injection 6 mg    cefdinir (OMNICEF) 300 MG capsule    FLUoxetine 10 MG capsule    metoprolol tartrate (LOPRESSOR) 25 MG tablet    pantoprazole (PROTONIX) 40 MG tablet    loratadine (CLARITIN) 10 mg tablet    fluticasone propionate (FLONASE) 50 mcg/actuation nasal spray

## 2025-01-13 RX ADMIN — BETAMETHASONE SODIUM PHOSPHATE AND BETAMETHASONE ACETATE 6 MG: 3; 3 INJECTION, SUSPENSION INTRA-ARTICULAR; INTRALESIONAL; INTRAMUSCULAR at 07:01

## 2025-03-07 DIAGNOSIS — D50.0 IRON DEFICIENCY ANEMIA SECONDARY TO BLOOD LOSS (CHRONIC): Primary | ICD-10-CM

## 2025-03-07 DIAGNOSIS — E55.9 VITAMIN D DEFICIENCY: ICD-10-CM

## 2025-03-07 DIAGNOSIS — D47.2 MONOCLONAL GAMMOPATHY OF UNKNOWN SIGNIFICANCE (MGUS): ICD-10-CM

## 2025-03-07 DIAGNOSIS — Z86.2 HISTORY OF IRON DEFICIENCY ANEMIA: ICD-10-CM

## 2025-03-07 DIAGNOSIS — N92.4 EXCESSIVE BLEEDING IN PREMENOPAUSAL PERIOD: ICD-10-CM

## 2025-03-07 DIAGNOSIS — G25.81 RLS (RESTLESS LEGS SYNDROME): ICD-10-CM

## 2025-03-10 ENCOUNTER — LAB VISIT (OUTPATIENT)
Dept: HEMATOLOGY/ONCOLOGY | Facility: CLINIC | Age: 50
End: 2025-03-10
Payer: MEDICAID

## 2025-03-10 DIAGNOSIS — D47.2 MONOCLONAL GAMMOPATHY OF UNKNOWN SIGNIFICANCE (MGUS): ICD-10-CM

## 2025-03-10 DIAGNOSIS — G25.81 RLS (RESTLESS LEGS SYNDROME): ICD-10-CM

## 2025-03-10 DIAGNOSIS — Z86.2 HISTORY OF IRON DEFICIENCY ANEMIA: ICD-10-CM

## 2025-03-10 DIAGNOSIS — N92.4 EXCESSIVE BLEEDING IN PREMENOPAUSAL PERIOD: ICD-10-CM

## 2025-03-10 DIAGNOSIS — E55.9 VITAMIN D DEFICIENCY: ICD-10-CM

## 2025-03-10 DIAGNOSIS — D50.0 IRON DEFICIENCY ANEMIA SECONDARY TO BLOOD LOSS (CHRONIC): ICD-10-CM

## 2025-03-10 LAB
ALBUMIN SERPL-MCNC: 3.4 G/DL (ref 3.5–5)
ALBUMIN/GLOB SERPL: 0.9 RATIO (ref 1.1–2)
ALP SERPL-CCNC: 65 UNIT/L (ref 40–150)
ALT SERPL-CCNC: 14 UNIT/L (ref 0–55)
ANION GAP SERPL CALC-SCNC: 5 MEQ/L
AST SERPL-CCNC: 19 UNIT/L (ref 5–34)
BASOPHILS # BLD AUTO: 0.08 X10(3)/MCL
BASOPHILS NFR BLD AUTO: 1.2 %
BILIRUB SERPL-MCNC: 0.2 MG/DL
BUN SERPL-MCNC: 13.8 MG/DL (ref 7–18.7)
CALCIUM SERPL-MCNC: 8.3 MG/DL (ref 8.4–10.2)
CHLORIDE SERPL-SCNC: 109 MMOL/L (ref 98–107)
CO2 SERPL-SCNC: 23 MMOL/L (ref 22–29)
CREAT SERPL-MCNC: 0.6 MG/DL (ref 0.55–1.02)
CREAT/UREA NIT SERPL: 23
EOSINOPHIL # BLD AUTO: 0.17 X10(3)/MCL (ref 0–0.9)
EOSINOPHIL NFR BLD AUTO: 2.6 %
ERYTHROCYTE [DISTWIDTH] IN BLOOD BY AUTOMATED COUNT: 12.9 % (ref 11.5–17)
FERRITIN SERPL-MCNC: 14.36 NG/ML (ref 4.63–204)
GFR SERPLBLD CREATININE-BSD FMLA CKD-EPI: >60 ML/MIN/1.73/M2
GLOBULIN SER-MCNC: 3.8 GM/DL (ref 2.4–3.5)
GLUCOSE SERPL-MCNC: 103 MG/DL (ref 74–100)
HCT VFR BLD AUTO: 36.3 % (ref 37–47)
HGB BLD-MCNC: 11.5 G/DL (ref 12–16)
IMM GRANULOCYTES # BLD AUTO: 0.01 X10(3)/MCL (ref 0–0.04)
IMM GRANULOCYTES NFR BLD AUTO: 0.2 %
IRON SATN MFR SERPL: 6 % (ref 20–50)
IRON SERPL-MCNC: 21 UG/DL (ref 50–170)
LYMPHOCYTES # BLD AUTO: 1.79 X10(3)/MCL (ref 0.6–4.6)
LYMPHOCYTES NFR BLD AUTO: 27.5 %
MCH RBC QN AUTO: 28.2 PG (ref 27–31)
MCHC RBC AUTO-ENTMCNC: 31.7 G/DL (ref 33–36)
MCV RBC AUTO: 89 FL (ref 80–94)
MONOCYTES # BLD AUTO: 0.75 X10(3)/MCL (ref 0.1–1.3)
MONOCYTES NFR BLD AUTO: 11.5 %
NEUTROPHILS # BLD AUTO: 3.71 X10(3)/MCL (ref 2.1–9.2)
NEUTROPHILS NFR BLD AUTO: 57 %
NRBC BLD AUTO-RTO: 0 %
PLATELET # BLD AUTO: 397 X10(3)/MCL (ref 130–400)
PMV BLD AUTO: 10.3 FL (ref 7.4–10.4)
POTASSIUM SERPL-SCNC: 3.5 MMOL/L (ref 3.5–5.1)
PROT SERPL-MCNC: 7.2 GM/DL (ref 6.4–8.3)
RBC # BLD AUTO: 4.08 X10(6)/MCL (ref 4.2–5.4)
SODIUM SERPL-SCNC: 137 MMOL/L (ref 136–145)
TIBC SERPL-MCNC: 319 UG/DL (ref 70–310)
TIBC SERPL-MCNC: 340 UG/DL (ref 250–450)
TRANSFERRIN SERPL-MCNC: 300 MG/DL (ref 180–382)
WBC # BLD AUTO: 6.51 X10(3)/MCL (ref 4.5–11.5)

## 2025-03-10 PROCEDURE — 83550 IRON BINDING TEST: CPT

## 2025-03-10 PROCEDURE — 82728 ASSAY OF FERRITIN: CPT

## 2025-03-10 PROCEDURE — 80053 COMPREHEN METABOLIC PANEL: CPT

## 2025-03-10 PROCEDURE — 85025 COMPLETE CBC W/AUTO DIFF WBC: CPT

## 2025-03-10 PROCEDURE — 36415 COLL VENOUS BLD VENIPUNCTURE: CPT

## 2025-03-12 RX ORDER — SODIUM CHLORIDE 0.9 % (FLUSH) 0.9 %
10 SYRINGE (ML) INJECTION
OUTPATIENT
Start: 2025-03-19

## 2025-03-12 RX ORDER — DIPHENHYDRAMINE HYDROCHLORIDE 50 MG/ML
50 INJECTION, SOLUTION INTRAMUSCULAR; INTRAVENOUS ONCE AS NEEDED
OUTPATIENT
Start: 2025-03-19

## 2025-03-12 RX ORDER — HEPARIN 100 UNIT/ML
500 SYRINGE INTRAVENOUS
OUTPATIENT
Start: 2025-03-19

## 2025-03-12 RX ORDER — EPINEPHRINE 0.3 MG/.3ML
0.3 INJECTION SUBCUTANEOUS ONCE AS NEEDED
OUTPATIENT
Start: 2025-03-19

## 2025-03-12 RX ORDER — SODIUM FERRIC GLUCONATE COMPLEX IN SUCROSE 12.5 MG/ML
125 INJECTION INTRAVENOUS
OUTPATIENT
Start: 2025-03-19

## 2025-03-12 NOTE — PROGRESS NOTES
Lab work from 03/10/2025 Hgb 11.5, Iron 21, Ferritin 14.36, Iron sat 6  Ordered IV iron Ferrlecit 125 mg weekly x 8 weeks   Voicemail informing patient of IV iron

## 2025-03-24 ENCOUNTER — TELEPHONE (OUTPATIENT)
Dept: HEMATOLOGY/ONCOLOGY | Facility: CLINIC | Age: 50
End: 2025-03-24
Payer: MEDICAID

## 2025-03-26 ENCOUNTER — INFUSION (OUTPATIENT)
Dept: INFUSION THERAPY | Facility: HOSPITAL | Age: 50
End: 2025-03-26
Attending: INTERNAL MEDICINE
Payer: MEDICAID

## 2025-03-26 ENCOUNTER — OFFICE VISIT (OUTPATIENT)
Dept: FAMILY MEDICINE | Facility: CLINIC | Age: 50
End: 2025-03-26
Payer: MEDICAID

## 2025-03-26 VITALS
BODY MASS INDEX: 26.25 KG/M2 | DIASTOLIC BLOOD PRESSURE: 72 MMHG | SYSTOLIC BLOOD PRESSURE: 108 MMHG | OXYGEN SATURATION: 98 % | TEMPERATURE: 98 F | WEIGHT: 142.63 LBS | HEART RATE: 73 BPM | HEIGHT: 62 IN | RESPIRATION RATE: 18 BRPM

## 2025-03-26 VITALS
RESPIRATION RATE: 18 BRPM | HEART RATE: 64 BPM | OXYGEN SATURATION: 99 % | DIASTOLIC BLOOD PRESSURE: 63 MMHG | SYSTOLIC BLOOD PRESSURE: 108 MMHG

## 2025-03-26 DIAGNOSIS — B34.9 VIRAL SYNDROME: Primary | ICD-10-CM

## 2025-03-26 DIAGNOSIS — E04.1 THYROID NODULE: ICD-10-CM

## 2025-03-26 DIAGNOSIS — Z86.2 HISTORY OF IRON DEFICIENCY ANEMIA: Primary | ICD-10-CM

## 2025-03-26 DIAGNOSIS — M79.89 LEG SWELLING: ICD-10-CM

## 2025-03-26 DIAGNOSIS — D50.0 IRON DEFICIENCY ANEMIA SECONDARY TO BLOOD LOSS (CHRONIC): ICD-10-CM

## 2025-03-26 PROCEDURE — 86665 EPSTEIN-BARR CAPSID VCA: CPT

## 2025-03-26 PROCEDURE — 25000003 PHARM REV CODE 250

## 2025-03-26 PROCEDURE — 96374 THER/PROPH/DIAG INJ IV PUSH: CPT

## 2025-03-26 PROCEDURE — 99214 OFFICE O/P EST MOD 30 MIN: CPT | Mod: PBBFAC,25 | Performed by: FAMILY MEDICINE

## 2025-03-26 PROCEDURE — 36415 COLL VENOUS BLD VENIPUNCTURE: CPT

## 2025-03-26 PROCEDURE — 86663 EPSTEIN-BARR ANTIBODY: CPT

## 2025-03-26 PROCEDURE — 63600175 PHARM REV CODE 636 W HCPCS

## 2025-03-26 RX ORDER — SODIUM CHLORIDE 0.9 % (FLUSH) 0.9 %
10 SYRINGE (ML) INJECTION
OUTPATIENT
Start: 2025-04-02

## 2025-03-26 RX ORDER — FLUOXETINE 10 MG/1
10 CAPSULE ORAL 2 TIMES DAILY
Qty: 180 CAPSULE | Refills: 1 | Status: SHIPPED | OUTPATIENT
Start: 2025-03-26

## 2025-03-26 RX ORDER — HEPARIN 100 UNIT/ML
500 SYRINGE INTRAVENOUS
Status: DISCONTINUED | OUTPATIENT
Start: 2025-03-26 | End: 2025-03-26 | Stop reason: HOSPADM

## 2025-03-26 RX ORDER — EPINEPHRINE 0.3 MG/.3ML
0.3 INJECTION SUBCUTANEOUS ONCE AS NEEDED
OUTPATIENT
Start: 2025-04-02

## 2025-03-26 RX ORDER — CLOTRIMAZOLE AND BETAMETHASONE DIPROPIONATE 10; .64 MG/G; MG/G
CREAM TOPICAL 2 TIMES DAILY
Qty: 45 G | Refills: 0 | Status: SHIPPED | OUTPATIENT
Start: 2025-03-26

## 2025-03-26 RX ORDER — SODIUM FERRIC GLUCONATE COMPLEX IN SUCROSE 12.5 MG/ML
125 INJECTION INTRAVENOUS
Status: COMPLETED | OUTPATIENT
Start: 2025-03-26 | End: 2025-03-26

## 2025-03-26 RX ORDER — HYDROXYZINE HYDROCHLORIDE 25 MG/1
25 TABLET, FILM COATED ORAL EVERY 8 HOURS PRN
COMMUNITY
Start: 2025-02-23 | End: 2025-03-26

## 2025-03-26 RX ORDER — DIPHENHYDRAMINE HYDROCHLORIDE 50 MG/ML
50 INJECTION, SOLUTION INTRAMUSCULAR; INTRAVENOUS ONCE AS NEEDED
OUTPATIENT
Start: 2025-04-02

## 2025-03-26 RX ORDER — SODIUM FERRIC GLUCONATE COMPLEX IN SUCROSE 12.5 MG/ML
125 INJECTION INTRAVENOUS
OUTPATIENT
Start: 2025-04-02

## 2025-03-26 RX ORDER — SODIUM CHLORIDE 0.9 % (FLUSH) 0.9 %
10 SYRINGE (ML) INJECTION
Status: DISCONTINUED | OUTPATIENT
Start: 2025-03-26 | End: 2025-03-26 | Stop reason: HOSPADM

## 2025-03-26 RX ORDER — HEPARIN 100 UNIT/ML
500 SYRINGE INTRAVENOUS
OUTPATIENT
Start: 2025-04-02

## 2025-03-26 RX ADMIN — SODIUM FERRIC GLUCONATE COMPLEX IN SUCROSE 125 MG: 12.5 INJECTION INTRAVENOUS at 08:03

## 2025-03-26 RX ADMIN — SODIUM CHLORIDE: 9 INJECTION, SOLUTION INTRAVENOUS at 08:03

## 2025-03-26 NOTE — PROGRESS NOTES
Subjective     Patient ID: Pretty Pratt is a 50 y.o. female.    Chief Complaint: Follow-up (ED followup)    HPI    03/26/2025    LLE swelling - went to [facility] about 3 weeks ago and  [place] 2[place], started about 2/27 with LLE swelling, then 2-3 days later red rash LLE below the knee, subj fever and chills, took her uncle's lasix without change, then developed generalized itchy red rash on face, scalp, trunk, legs, no  CP SOB in  the beginning. She was seen urgent care upon return -covid swabs (-), got cortisone shot and Atarax/HC cream. Itching all resolved now. Paresthesias stocking,glove pattern started around 3/1 and has persisted but not progressed.    ED 2/21/2025  - in [place], while returning from [facility], CXR, CTA chest, CT A/PO, Venous US Bl LE, labs all unrevealing except for few atypical lymphs    ED 3/25/2025 - CP started 3/24, no trauma, activity, worse bending forward, substernal and right sided (shoots through to back), + palp, pain 50% better today, LAB, CXR EKG, trop, BNP, UPT, US BL LE were all  (-)      JAQUELINE -  called [facility]  3/2025 to repeat labs since she felt like her anemia was back, JAQUELINE confirmed, took #1/8 today    IUD -Mirena placed 11/2023, had light monthly menses some months, started back more heavy in  1/2025 - lasted 8 days, 2/22025 6 days, 3/2025 - all heavy with clots first few days, took Provera BID for 3 days about second week of March        Current Outpatient Medications   Medication Instructions    FLUoxetine 10 mg, Oral, 2 times daily    fluticasone propionate (FLONASE) 100 mcg, Each Nostril, Daily    latanoprost 0.005 % ophthalmic solution 1 drop, Nightly    loratadine (CLARITIN) 10 mg, Oral, Daily    metoprolol tartrate (LOPRESSOR) 25 mg, Oral, 2 times daily    pantoprazole (PROTONIX) 40 mg, Oral, Daily         ROS  Constitutional: no fever, fatigue, weakness  ENT: no sore throat, ear pain,  nasal congestion/drainage  Respiratory: no cough, wheezing,  "SOB  Cardiovascular: no CP, palpitations, edema  Gastrointestinal: no NVD, ABD pain, blood in stool, melena  Genitourinary: no dysuria, frequency, urgency, hematuria  Hema/Lymph: no abnormal bruising or bleeding  Musculoskeletal: no muscle or joint pain, no joint swelling  Integumentary: no skin rash or abnormal lesion  Neurologic: no headache, dizziness, numbness    PE  Vitals:    03/26/25 0949   BP: 108/72   BP Location: Left arm   Patient Position: Sitting   Pulse: 73   Resp: 18   Temp: 98.4 °F (36.9 °C)   TempSrc: Oral   SpO2: 98%   Weight: 64.7 kg (142 lb 9.6 oz)   Height: 5' 2.01" (1.575 m)     General - NAD, comfortable  HEENT- nl TM,EAC, kolton w/o redness  Neck - no adenopathy, mass ,thyromegaly  Respiratory - CTA BL, no distress  Cardiovascular - RRR, no murmur  Gastrointestinal - soft NT, no mass, nl BS x 4   Musculoskeletal - no joint swelling or inflammation, no tend, moves well  Neurologic - no focal deficits, nl CN, nl gait and balance    Assessment  1. Viral syndrome    2. Iron deficiency anemia secondary to blood loss (chronic)    3. Leg swelling        Plan  Continue current meds with any changes listed below  Complicated clinical picture - It appears she started with a cellulitis LLE, then possibly developed an allergic reaction to either Lasix to something she ate which resolved slowly after cortisone shot in urgent care upon [place] the [place] 2[place].  The generalized  rash  may have been infectious but she had such intense itching it really sounds allergic. She is now left with intermittent mild LLE swelling that is worse at end of day and better in am.  The right sided CP appears MSK and, tender R 2-3rd CC junctions and is already much improved in just a few days. Her stocking/glove paresthesias have occurred before when iron was really low so we will follow this symptom for now as she gets her IV iron treatments.  We will arrange for her to see CARD in  for eval as well. She continue to wear " compression stocking and will call sooner with any change in condition.    Mammo due  Rtc 4 weeks -coordinating with date of IV iron

## 2025-03-28 LAB
EBV NA IGG SER QL IA: POSITIVE
EBV VCA IGG SER QL IA: POSITIVE
EBV VCA IGM SER QL IA: NEGATIVE
IMMUNOLOGIST REVIEW: ABNORMAL

## 2025-04-01 LAB — EBV EA IGG SER QL IA: NEGATIVE

## 2025-04-02 ENCOUNTER — INFUSION (OUTPATIENT)
Dept: INFUSION THERAPY | Facility: HOSPITAL | Age: 50
End: 2025-04-02
Attending: INTERNAL MEDICINE
Payer: MEDICAID

## 2025-04-02 VITALS
WEIGHT: 149.81 LBS | BODY MASS INDEX: 27.57 KG/M2 | OXYGEN SATURATION: 98 % | HEIGHT: 62 IN | RESPIRATION RATE: 20 BRPM | SYSTOLIC BLOOD PRESSURE: 115 MMHG | DIASTOLIC BLOOD PRESSURE: 73 MMHG | HEART RATE: 74 BPM | TEMPERATURE: 98 F

## 2025-04-02 DIAGNOSIS — Z86.2 HISTORY OF IRON DEFICIENCY ANEMIA: Primary | ICD-10-CM

## 2025-04-02 PROCEDURE — 96374 THER/PROPH/DIAG INJ IV PUSH: CPT

## 2025-04-02 PROCEDURE — A4216 STERILE WATER/SALINE, 10 ML: HCPCS

## 2025-04-02 PROCEDURE — 63600175 PHARM REV CODE 636 W HCPCS

## 2025-04-02 PROCEDURE — 25000003 PHARM REV CODE 250

## 2025-04-02 RX ORDER — SODIUM CHLORIDE 0.9 % (FLUSH) 0.9 %
10 SYRINGE (ML) INJECTION
Status: DISCONTINUED | OUTPATIENT
Start: 2025-04-02 | End: 2025-04-02 | Stop reason: HOSPADM

## 2025-04-02 RX ORDER — EPINEPHRINE 1 MG/ML
0.3 INJECTION INTRAMUSCULAR; INTRAVENOUS; SUBCUTANEOUS ONCE AS NEEDED
Status: DISCONTINUED | OUTPATIENT
Start: 2025-04-02 | End: 2025-04-02 | Stop reason: HOSPADM

## 2025-04-02 RX ORDER — DIPHENHYDRAMINE HYDROCHLORIDE 50 MG/ML
50 INJECTION, SOLUTION INTRAMUSCULAR; INTRAVENOUS ONCE AS NEEDED
OUTPATIENT
Start: 2025-04-09

## 2025-04-02 RX ORDER — EPINEPHRINE 0.3 MG/.3ML
0.3 INJECTION SUBCUTANEOUS ONCE AS NEEDED
OUTPATIENT
Start: 2025-04-09

## 2025-04-02 RX ORDER — SODIUM FERRIC GLUCONATE COMPLEX IN SUCROSE 12.5 MG/ML
125 INJECTION INTRAVENOUS
Status: COMPLETED | OUTPATIENT
Start: 2025-04-02 | End: 2025-04-02

## 2025-04-02 RX ORDER — SODIUM CHLORIDE 0.9 % (FLUSH) 0.9 %
10 SYRINGE (ML) INJECTION
OUTPATIENT
Start: 2025-04-09

## 2025-04-02 RX ORDER — SODIUM FERRIC GLUCONATE COMPLEX IN SUCROSE 12.5 MG/ML
125 INJECTION INTRAVENOUS
OUTPATIENT
Start: 2025-04-09

## 2025-04-02 RX ORDER — EPINEPHRINE 0.3 MG/.3ML
0.3 INJECTION SUBCUTANEOUS ONCE AS NEEDED
Status: CANCELLED | OUTPATIENT
Start: 2025-04-09

## 2025-04-02 RX ORDER — HEPARIN 100 UNIT/ML
500 SYRINGE INTRAVENOUS
OUTPATIENT
Start: 2025-04-09

## 2025-04-02 RX ORDER — DIPHENHYDRAMINE HYDROCHLORIDE 50 MG/ML
50 INJECTION, SOLUTION INTRAMUSCULAR; INTRAVENOUS ONCE AS NEEDED
Status: DISCONTINUED | OUTPATIENT
Start: 2025-04-02 | End: 2025-04-02 | Stop reason: HOSPADM

## 2025-04-02 RX ADMIN — SODIUM FERRIC GLUCONATE COMPLEX IN SUCROSE 125 MG: 12.5 INJECTION INTRAVENOUS at 01:04

## 2025-04-02 RX ADMIN — Medication 10 ML: at 01:04

## 2025-04-02 NOTE — NURSING
Pt and dtr [facility] Ferrlecit # 2 of 8, pt c/o back and leg anabel of 8/10; pt received Ferrlecit without incident, AVS given.

## 2025-04-09 ENCOUNTER — INFUSION (OUTPATIENT)
Dept: INFUSION THERAPY | Facility: HOSPITAL | Age: 50
End: 2025-04-09
Attending: INTERNAL MEDICINE
Payer: MEDICAID

## 2025-04-09 VITALS
BODY MASS INDEX: 28.04 KG/M2 | OXYGEN SATURATION: 98 % | DIASTOLIC BLOOD PRESSURE: 77 MMHG | TEMPERATURE: 98 F | SYSTOLIC BLOOD PRESSURE: 101 MMHG | HEART RATE: 66 BPM | WEIGHT: 152.38 LBS | RESPIRATION RATE: 20 BRPM | HEIGHT: 62 IN

## 2025-04-09 DIAGNOSIS — Z86.2 HISTORY OF IRON DEFICIENCY ANEMIA: Primary | ICD-10-CM

## 2025-04-09 PROCEDURE — 63600175 PHARM REV CODE 636 W HCPCS

## 2025-04-09 PROCEDURE — 96374 THER/PROPH/DIAG INJ IV PUSH: CPT

## 2025-04-09 RX ORDER — HEPARIN 100 UNIT/ML
500 SYRINGE INTRAVENOUS
OUTPATIENT
Start: 2025-04-16

## 2025-04-09 RX ORDER — SODIUM FERRIC GLUCONATE COMPLEX IN SUCROSE 12.5 MG/ML
125 INJECTION INTRAVENOUS
OUTPATIENT
Start: 2025-04-16

## 2025-04-09 RX ORDER — DIPHENHYDRAMINE HYDROCHLORIDE 50 MG/ML
50 INJECTION, SOLUTION INTRAMUSCULAR; INTRAVENOUS ONCE AS NEEDED
OUTPATIENT
Start: 2025-04-16

## 2025-04-09 RX ORDER — SODIUM CHLORIDE 0.9 % (FLUSH) 0.9 %
10 SYRINGE (ML) INJECTION
OUTPATIENT
Start: 2025-04-16

## 2025-04-09 RX ORDER — EPINEPHRINE 0.3 MG/.3ML
0.3 INJECTION SUBCUTANEOUS ONCE AS NEEDED
OUTPATIENT
Start: 2025-04-16

## 2025-04-09 RX ORDER — SODIUM CHLORIDE 0.9 % (FLUSH) 0.9 %
10 SYRINGE (ML) INJECTION
Status: DISCONTINUED | OUTPATIENT
Start: 2025-04-09 | End: 2025-04-09 | Stop reason: HOSPADM

## 2025-04-09 RX ORDER — SODIUM FERRIC GLUCONATE COMPLEX IN SUCROSE 12.5 MG/ML
125 INJECTION INTRAVENOUS
Status: COMPLETED | OUTPATIENT
Start: 2025-04-09 | End: 2025-04-09

## 2025-04-09 RX ADMIN — SODIUM FERRIC GLUCONATE COMPLEX IN SUCROSE 125 MG: 12.5 INJECTION INTRAVENOUS at 08:04

## 2025-04-15 ENCOUNTER — TELEPHONE (OUTPATIENT)
Dept: FAMILY MEDICINE | Facility: CLINIC | Age: 50
End: 2025-04-15
Payer: MEDICAID

## 2025-04-15 RX ORDER — SUCRALFATE 1 G/1
1 TABLET ORAL 2 TIMES DAILY
Qty: 60 TABLET | Refills: 0 | Status: SHIPPED | OUTPATIENT
Start: 2025-04-15

## 2025-04-15 NOTE — TELEPHONE ENCOUNTER
----- Message from Sujatha Kline sent at 4/15/2025 10:10 AM CDT -----  Pt called requesting medicationSucralfate for acid reflux/ulcers due to receiving iron transfusions.Thank you

## 2025-04-16 ENCOUNTER — INFUSION (OUTPATIENT)
Dept: INFUSION THERAPY | Facility: HOSPITAL | Age: 50
End: 2025-04-16
Attending: INTERNAL MEDICINE
Payer: MEDICAID

## 2025-04-16 VITALS
SYSTOLIC BLOOD PRESSURE: 113 MMHG | BODY MASS INDEX: 27.95 KG/M2 | HEIGHT: 62 IN | TEMPERATURE: 98 F | HEART RATE: 77 BPM | OXYGEN SATURATION: 99 % | WEIGHT: 151.88 LBS | RESPIRATION RATE: 20 BRPM | DIASTOLIC BLOOD PRESSURE: 74 MMHG

## 2025-04-16 DIAGNOSIS — Z86.2 HISTORY OF IRON DEFICIENCY ANEMIA: Primary | ICD-10-CM

## 2025-04-16 PROCEDURE — 96374 THER/PROPH/DIAG INJ IV PUSH: CPT

## 2025-04-16 PROCEDURE — 63600175 PHARM REV CODE 636 W HCPCS

## 2025-04-16 RX ORDER — SODIUM CHLORIDE 0.9 % (FLUSH) 0.9 %
10 SYRINGE (ML) INJECTION
OUTPATIENT
Start: 2025-04-23

## 2025-04-16 RX ORDER — SODIUM FERRIC GLUCONATE COMPLEX IN SUCROSE 12.5 MG/ML
125 INJECTION INTRAVENOUS
OUTPATIENT
Start: 2025-04-23

## 2025-04-16 RX ORDER — HEPARIN 100 UNIT/ML
500 SYRINGE INTRAVENOUS
OUTPATIENT
Start: 2025-04-23

## 2025-04-16 RX ORDER — HEPARIN 100 UNIT/ML
500 SYRINGE INTRAVENOUS
Status: DISCONTINUED | OUTPATIENT
Start: 2025-04-16 | End: 2025-04-16 | Stop reason: HOSPADM

## 2025-04-16 RX ORDER — SODIUM FERRIC GLUCONATE COMPLEX IN SUCROSE 12.5 MG/ML
125 INJECTION INTRAVENOUS
Status: COMPLETED | OUTPATIENT
Start: 2025-04-16 | End: 2025-04-16

## 2025-04-16 RX ORDER — EPINEPHRINE 1 MG/ML
0.3 INJECTION INTRAMUSCULAR; INTRAVENOUS; SUBCUTANEOUS ONCE AS NEEDED
Status: DISCONTINUED | OUTPATIENT
Start: 2025-04-16 | End: 2025-04-16 | Stop reason: HOSPADM

## 2025-04-16 RX ORDER — EPINEPHRINE 0.3 MG/.3ML
0.3 INJECTION SUBCUTANEOUS ONCE AS NEEDED
OUTPATIENT
Start: 2025-04-23

## 2025-04-16 RX ORDER — DIPHENHYDRAMINE HYDROCHLORIDE 50 MG/ML
50 INJECTION, SOLUTION INTRAMUSCULAR; INTRAVENOUS ONCE AS NEEDED
OUTPATIENT
Start: 2025-04-23

## 2025-04-16 RX ORDER — DIPHENHYDRAMINE HYDROCHLORIDE 50 MG/ML
50 INJECTION, SOLUTION INTRAMUSCULAR; INTRAVENOUS ONCE AS NEEDED
Status: DISCONTINUED | OUTPATIENT
Start: 2025-04-16 | End: 2025-04-16 | Stop reason: HOSPADM

## 2025-04-16 RX ORDER — SODIUM CHLORIDE 0.9 % (FLUSH) 0.9 %
10 SYRINGE (ML) INJECTION
Status: DISCONTINUED | OUTPATIENT
Start: 2025-04-16 | End: 2025-04-16 | Stop reason: HOSPADM

## 2025-04-16 RX ADMIN — SODIUM FERRIC GLUCONATE COMPLEX IN SUCROSE 125 MG: 12.5 INJECTION INTRAVENOUS at 08:04

## 2025-04-23 ENCOUNTER — OFFICE VISIT (OUTPATIENT)
Dept: FAMILY MEDICINE | Facility: CLINIC | Age: 50
End: 2025-04-23
Payer: MEDICAID

## 2025-04-23 ENCOUNTER — INFUSION (OUTPATIENT)
Dept: INFUSION THERAPY | Facility: HOSPITAL | Age: 50
End: 2025-04-23
Attending: INTERNAL MEDICINE
Payer: MEDICAID

## 2025-04-23 VITALS
HEART RATE: 92 BPM | BODY MASS INDEX: 27.6 KG/M2 | SYSTOLIC BLOOD PRESSURE: 129 MMHG | HEIGHT: 62 IN | WEIGHT: 150 LBS | TEMPERATURE: 98 F | DIASTOLIC BLOOD PRESSURE: 81 MMHG | OXYGEN SATURATION: 96 %

## 2025-04-23 VITALS
DIASTOLIC BLOOD PRESSURE: 90 MMHG | TEMPERATURE: 98 F | HEART RATE: 91 BPM | WEIGHT: 150.38 LBS | SYSTOLIC BLOOD PRESSURE: 132 MMHG | BODY MASS INDEX: 27.67 KG/M2 | RESPIRATION RATE: 20 BRPM | OXYGEN SATURATION: 97 % | HEIGHT: 62 IN

## 2025-04-23 DIAGNOSIS — D50.0 IRON DEFICIENCY ANEMIA SECONDARY TO BLOOD LOSS (CHRONIC): ICD-10-CM

## 2025-04-23 DIAGNOSIS — M79.89 LEG SWELLING: Primary | ICD-10-CM

## 2025-04-23 DIAGNOSIS — Z86.2 HISTORY OF IRON DEFICIENCY ANEMIA: Primary | ICD-10-CM

## 2025-04-23 DIAGNOSIS — Z12.31 SCREENING MAMMOGRAM FOR BREAST CANCER: ICD-10-CM

## 2025-04-23 DIAGNOSIS — E04.1 THYROID NODULE: ICD-10-CM

## 2025-04-23 PROCEDURE — 96374 THER/PROPH/DIAG INJ IV PUSH: CPT

## 2025-04-23 PROCEDURE — 63600175 PHARM REV CODE 636 W HCPCS

## 2025-04-23 PROCEDURE — 99215 OFFICE O/P EST HI 40 MIN: CPT | Mod: PBBFAC,25 | Performed by: FAMILY MEDICINE

## 2025-04-23 RX ORDER — EPINEPHRINE 0.3 MG/.3ML
0.3 INJECTION SUBCUTANEOUS ONCE AS NEEDED
OUTPATIENT
Start: 2025-04-30

## 2025-04-23 RX ORDER — SODIUM FERRIC GLUCONATE COMPLEX IN SUCROSE 12.5 MG/ML
125 INJECTION INTRAVENOUS
Status: COMPLETED | OUTPATIENT
Start: 2025-04-23 | End: 2025-04-23

## 2025-04-23 RX ORDER — SODIUM CHLORIDE 0.9 % (FLUSH) 0.9 %
10 SYRINGE (ML) INJECTION
Status: DISCONTINUED | OUTPATIENT
Start: 2025-04-23 | End: 2025-04-23 | Stop reason: HOSPADM

## 2025-04-23 RX ORDER — HEPARIN 100 UNIT/ML
500 SYRINGE INTRAVENOUS
OUTPATIENT
Start: 2025-04-30

## 2025-04-23 RX ORDER — SODIUM CHLORIDE 0.9 % (FLUSH) 0.9 %
10 SYRINGE (ML) INJECTION
OUTPATIENT
Start: 2025-04-30

## 2025-04-23 RX ORDER — SODIUM FERRIC GLUCONATE COMPLEX IN SUCROSE 12.5 MG/ML
125 INJECTION INTRAVENOUS
OUTPATIENT
Start: 2025-04-30

## 2025-04-23 RX ORDER — DIPHENHYDRAMINE HYDROCHLORIDE 50 MG/ML
50 INJECTION, SOLUTION INTRAMUSCULAR; INTRAVENOUS ONCE AS NEEDED
OUTPATIENT
Start: 2025-04-30

## 2025-04-23 RX ADMIN — SODIUM FERRIC GLUCONATE COMPLEX IN SUCROSE 125 MG: 12.5 INJECTION INTRAVENOUS at 02:04

## 2025-04-23 NOTE — PROGRESS NOTES
"Subjective     Patient ID: Pretty Pratt is a 50 y.o. female.    Chief Complaint: Follow-up (Here today to follow up leg swelling,  denies any new complaints)    HPI    04/23/2025    Doing well overall  All s/s of leg swelling, paresthesias are resolved  No CP  Feeling much better since restarted IV iron, took #5/8 today  Has GYN FU 5/2025  No c/o      Care Team  [facility] GYN, Oncology, ophth  [facility] HEMEONC -[facility]  [facility] Ophtho -missed last few appt  [facility] sleep study -canceled 2024 appts      HM  Mammo 1/2023  PAP 3/2017 NIL, 7/2022 - insufficient but HPV (-)  CRC: 2019 cscope  EGD,PCE 2019    Current Outpatient Medications   Medication Instructions    clotrimazole-betamethasone 1-0.05% (LOTRISONE) cream Topical (Top), 2 times daily    FLUoxetine 10 mg, Oral, 2 times daily    fluticasone propionate (FLONASE) 100 mcg, Each Nostril, Daily    latanoprost 0.005 % ophthalmic solution 1 drop, Nightly    loratadine (CLARITIN) 10 mg, Oral, Daily    metoprolol tartrate (LOPRESSOR) 25 mg, Oral, 2 times daily    pantoprazole (PROTONIX) 40 mg, Oral, Daily    sucralfate (CARAFATE) 1 g, Oral, 2 times daily         ROS  Respiratory: no cough, wheezing, SOB  Cardiovascular: no CP, palpitations, edema  Gastrointestinal: no NVD, ABD pain, blood in stool, melena  Genitourinary: no dysuria, frequency, urgency, hematuria      PE  Vitals:    04/23/25 1510   BP: 129/81   BP Location: Left arm   Patient Position: Sitting   Pulse: 92   Temp: 97.6 °F (36.4 °C)   TempSrc: Oral   SpO2: 96%   Weight: 68 kg (150 lb)   Height: 5' 2" (1.575 m)     General - NAD, comfortable  HEENT- nl TM,EAC, kolton w/o redness  Neck - no adenopathy, mass ,thyromegaly  Respiratory - CTA BL, no distress  Cardiovascular - RRR, no murmur,edema  Gastrointestinal - soft NT, no mass, nl BS x 4  No rash       Assessment  1. Leg swelling    2. Thyroid nodule    3. Iron deficiency anemia secondary to blood loss (chronic)    4. Screening mammogram for breast cancer  "       Plan  Continue current meds with any changes listed below  Mammo  Thyroid US - if stable nodule this completes her 5 yr surveillance, 2020 bx benign   Will hold off on CARD eval at this time, having no related s/s  RTC  3 mos    Orders Placed This Encounter    Mammo Digital Screening Bilat w/ Marv ([facility])    US Thyroid

## 2025-04-30 ENCOUNTER — HOSPITAL ENCOUNTER (OUTPATIENT)
Dept: RADIOLOGY | Facility: HOSPITAL | Age: 50
Discharge: HOME | End: 2025-04-30
Attending: FAMILY MEDICINE
Payer: MEDICAID

## 2025-04-30 ENCOUNTER — INFUSION (OUTPATIENT)
Dept: INFUSION THERAPY | Facility: HOSPITAL | Age: 50
End: 2025-04-30
Attending: INTERNAL MEDICINE
Payer: MEDICAID

## 2025-04-30 VITALS
RESPIRATION RATE: 18 BRPM | DIASTOLIC BLOOD PRESSURE: 88 MMHG | WEIGHT: 149.19 LBS | SYSTOLIC BLOOD PRESSURE: 129 MMHG | HEART RATE: 70 BPM | HEIGHT: 62 IN | TEMPERATURE: 98 F | OXYGEN SATURATION: 99 % | BODY MASS INDEX: 27.45 KG/M2

## 2025-04-30 DIAGNOSIS — E04.1 THYROID NODULE: ICD-10-CM

## 2025-04-30 DIAGNOSIS — Z86.2 HISTORY OF IRON DEFICIENCY ANEMIA: Primary | ICD-10-CM

## 2025-04-30 PROCEDURE — 76536 US EXAM OF HEAD AND NECK: CPT | Mod: TC

## 2025-04-30 PROCEDURE — 63600175 PHARM REV CODE 636 W HCPCS

## 2025-04-30 PROCEDURE — 25000003 PHARM REV CODE 250

## 2025-04-30 PROCEDURE — 96374 THER/PROPH/DIAG INJ IV PUSH: CPT

## 2025-04-30 RX ORDER — DIPHENHYDRAMINE HYDROCHLORIDE 50 MG/ML
50 INJECTION, SOLUTION INTRAMUSCULAR; INTRAVENOUS ONCE AS NEEDED
OUTPATIENT
Start: 2025-05-07

## 2025-04-30 RX ORDER — HEPARIN 100 UNIT/ML
500 SYRINGE INTRAVENOUS
OUTPATIENT
Start: 2025-05-07

## 2025-04-30 RX ORDER — SODIUM CHLORIDE 0.9 % (FLUSH) 0.9 %
10 SYRINGE (ML) INJECTION
OUTPATIENT
Start: 2025-05-07

## 2025-04-30 RX ORDER — EPINEPHRINE 0.3 MG/.3ML
0.3 INJECTION SUBCUTANEOUS ONCE AS NEEDED
OUTPATIENT
Start: 2025-05-07

## 2025-04-30 RX ORDER — SODIUM FERRIC GLUCONATE COMPLEX IN SUCROSE 12.5 MG/ML
125 INJECTION INTRAVENOUS
OUTPATIENT
Start: 2025-05-07

## 2025-04-30 RX ORDER — HEPARIN 100 UNIT/ML
500 SYRINGE INTRAVENOUS
Status: DISCONTINUED | OUTPATIENT
Start: 2025-04-30 | End: 2025-04-30 | Stop reason: HOSPADM

## 2025-04-30 RX ORDER — SODIUM CHLORIDE 0.9 % (FLUSH) 0.9 %
10 SYRINGE (ML) INJECTION
Status: DISCONTINUED | OUTPATIENT
Start: 2025-04-30 | End: 2025-04-30 | Stop reason: HOSPADM

## 2025-04-30 RX ORDER — SODIUM FERRIC GLUCONATE COMPLEX IN SUCROSE 12.5 MG/ML
125 INJECTION INTRAVENOUS
Status: COMPLETED | OUTPATIENT
Start: 2025-04-30 | End: 2025-04-30

## 2025-04-30 RX ADMIN — SODIUM FERRIC GLUCONATE COMPLEX IN SUCROSE 125 MG: 12.5 INJECTION INTRAVENOUS at 08:04

## 2025-04-30 RX ADMIN — SODIUM CHLORIDE: 9 INJECTION, SOLUTION INTRAVENOUS at 08:04

## 2025-05-07 ENCOUNTER — INFUSION (OUTPATIENT)
Dept: INFUSION THERAPY | Facility: HOSPITAL | Age: 50
End: 2025-05-07
Attending: INTERNAL MEDICINE
Payer: MEDICAID

## 2025-05-07 VITALS
SYSTOLIC BLOOD PRESSURE: 121 MMHG | RESPIRATION RATE: 20 BRPM | OXYGEN SATURATION: 100 % | HEIGHT: 62 IN | WEIGHT: 150.19 LBS | TEMPERATURE: 98 F | BODY MASS INDEX: 27.64 KG/M2 | HEART RATE: 87 BPM | DIASTOLIC BLOOD PRESSURE: 80 MMHG

## 2025-05-07 DIAGNOSIS — Z86.2 HISTORY OF IRON DEFICIENCY ANEMIA: Primary | ICD-10-CM

## 2025-05-07 PROCEDURE — 63600175 PHARM REV CODE 636 W HCPCS

## 2025-05-07 PROCEDURE — 25000003 PHARM REV CODE 250

## 2025-05-07 PROCEDURE — 96374 THER/PROPH/DIAG INJ IV PUSH: CPT

## 2025-05-07 RX ORDER — HEPARIN 100 UNIT/ML
500 SYRINGE INTRAVENOUS
OUTPATIENT
Start: 2025-05-14

## 2025-05-07 RX ORDER — EPINEPHRINE 0.3 MG/.3ML
0.3 INJECTION SUBCUTANEOUS ONCE AS NEEDED
OUTPATIENT
Start: 2025-05-14

## 2025-05-07 RX ORDER — SODIUM FERRIC GLUCONATE COMPLEX IN SUCROSE 12.5 MG/ML
125 INJECTION INTRAVENOUS
Status: COMPLETED | OUTPATIENT
Start: 2025-05-07 | End: 2025-05-07

## 2025-05-07 RX ORDER — HEPARIN 100 UNIT/ML
500 SYRINGE INTRAVENOUS
Status: DISCONTINUED | OUTPATIENT
Start: 2025-05-07 | End: 2025-05-07 | Stop reason: HOSPADM

## 2025-05-07 RX ORDER — DIPHENHYDRAMINE HYDROCHLORIDE 50 MG/ML
50 INJECTION, SOLUTION INTRAMUSCULAR; INTRAVENOUS ONCE AS NEEDED
OUTPATIENT
Start: 2025-05-14

## 2025-05-07 RX ORDER — SODIUM CHLORIDE 0.9 % (FLUSH) 0.9 %
10 SYRINGE (ML) INJECTION
Status: DISCONTINUED | OUTPATIENT
Start: 2025-05-07 | End: 2025-05-07 | Stop reason: HOSPADM

## 2025-05-07 RX ORDER — SODIUM FERRIC GLUCONATE COMPLEX IN SUCROSE 12.5 MG/ML
125 INJECTION INTRAVENOUS
OUTPATIENT
Start: 2025-05-14

## 2025-05-07 RX ORDER — SODIUM CHLORIDE 0.9 % (FLUSH) 0.9 %
10 SYRINGE (ML) INJECTION
OUTPATIENT
Start: 2025-05-14

## 2025-05-07 RX ADMIN — SODIUM FERRIC GLUCONATE COMPLEX IN SUCROSE 125 MG: 12.5 INJECTION INTRAVENOUS at 02:05

## 2025-05-07 RX ADMIN — SODIUM CHLORIDE: 9 INJECTION, SOLUTION INTRAVENOUS at 02:05

## 2025-05-07 NOTE — NURSING
1440  [facility]  Pt accomp by her daughter.  Pt denies any pain.  Pt does report feeling tired.

## 2025-05-15 ENCOUNTER — TELEPHONE (OUTPATIENT)
Dept: FAMILY MEDICINE | Facility: CLINIC | Age: 50
End: 2025-05-15
Payer: MEDICAID

## 2025-05-15 DIAGNOSIS — E04.1 NODULE OF RIGHT LOBE OF THYROID GLAND: Primary | ICD-10-CM

## 2025-05-15 NOTE — TELEPHONE ENCOUNTER
"Spoke to pt about thyroid US results  Unsure if the "2.3cm" nodule at the isthmus is part of the right lobe nodule, or a new larger nodule in the isthmus (compared to 2023)    Pt c/o trouble swallowing at level of thyroid      Prev FNA  -01/13/2020: Right thyroid nodule, ultrasound-guided FNA: Benign follicular nodule with cystic degeneration       "

## 2025-05-21 RX ORDER — SUCRALFATE 1 G/1
1 TABLET ORAL 2 TIMES DAILY
Qty: 60 TABLET | Refills: 5 | Status: SHIPPED | OUTPATIENT
Start: 2025-05-21

## 2025-05-30 ENCOUNTER — INFUSION (OUTPATIENT)
Dept: INFUSION THERAPY | Facility: HOSPITAL | Age: 50
End: 2025-05-30
Attending: INTERNAL MEDICINE
Payer: MEDICAID

## 2025-05-30 VITALS
OXYGEN SATURATION: 100 % | HEART RATE: 75 BPM | HEIGHT: 62 IN | DIASTOLIC BLOOD PRESSURE: 80 MMHG | TEMPERATURE: 98 F | BODY MASS INDEX: 27.81 KG/M2 | RESPIRATION RATE: 18 BRPM | WEIGHT: 151.13 LBS | SYSTOLIC BLOOD PRESSURE: 125 MMHG

## 2025-05-30 DIAGNOSIS — Z86.2 HISTORY OF IRON DEFICIENCY ANEMIA: Primary | ICD-10-CM

## 2025-05-30 PROCEDURE — 63600175 PHARM REV CODE 636 W HCPCS

## 2025-05-30 PROCEDURE — 96374 THER/PROPH/DIAG INJ IV PUSH: CPT

## 2025-05-30 PROCEDURE — 25000003 PHARM REV CODE 250

## 2025-05-30 RX ORDER — SODIUM CHLORIDE 0.9 % (FLUSH) 0.9 %
10 SYRINGE (ML) INJECTION
OUTPATIENT
Start: 2025-05-30

## 2025-05-30 RX ORDER — HEPARIN 100 UNIT/ML
500 SYRINGE INTRAVENOUS
OUTPATIENT
Start: 2025-05-30

## 2025-05-30 RX ORDER — SODIUM FERRIC GLUCONATE COMPLEX IN SUCROSE 12.5 MG/ML
125 INJECTION INTRAVENOUS
Status: CANCELLED | OUTPATIENT
Start: 2025-05-30

## 2025-05-30 RX ORDER — SODIUM CHLORIDE 0.9 % (FLUSH) 0.9 %
10 SYRINGE (ML) INJECTION
Status: DISCONTINUED | OUTPATIENT
Start: 2025-05-30 | End: 2025-05-30 | Stop reason: HOSPADM

## 2025-05-30 RX ORDER — DIPHENHYDRAMINE HYDROCHLORIDE 50 MG/ML
50 INJECTION, SOLUTION INTRAMUSCULAR; INTRAVENOUS ONCE AS NEEDED
OUTPATIENT
Start: 2025-05-30

## 2025-05-30 RX ORDER — HEPARIN 100 UNIT/ML
500 SYRINGE INTRAVENOUS
Status: DISCONTINUED | OUTPATIENT
Start: 2025-05-30 | End: 2025-05-30 | Stop reason: HOSPADM

## 2025-05-30 RX ORDER — EPINEPHRINE 0.3 MG/.3ML
0.3 INJECTION SUBCUTANEOUS ONCE AS NEEDED
OUTPATIENT
Start: 2025-05-30

## 2025-05-30 RX ORDER — SODIUM FERRIC GLUCONATE COMPLEX IN SUCROSE 12.5 MG/ML
125 INJECTION INTRAVENOUS
Status: COMPLETED | OUTPATIENT
Start: 2025-05-30 | End: 2025-05-30

## 2025-05-30 RX ADMIN — SODIUM FERRIC GLUCONATE COMPLEX IN SUCROSE 125 MG: 12.5 INJECTION INTRAVENOUS at 11:05

## 2025-05-30 RX ADMIN — SODIUM CHLORIDE: 9 INJECTION, SOLUTION INTRAVENOUS at 11:05

## 2025-05-30 NOTE — NURSING
1115 Patient is here for Ferrlecit #8/8. She c/o  acid reflux & constipation, but is taking something otc and it helps.     1147 Ferrlecit 125mgs ivp given via left ac.

## 2025-06-05 ENCOUNTER — PATIENT MESSAGE (OUTPATIENT)
Dept: FAMILY MEDICINE | Facility: CLINIC | Age: 50
End: 2025-06-05
Payer: MEDICAID

## 2025-06-05 RX ORDER — PANTOPRAZOLE SODIUM 40 MG/1
40 TABLET, DELAYED RELEASE ORAL DAILY
Qty: 90 TABLET | Refills: 1 | Status: SHIPPED | OUTPATIENT
Start: 2025-06-05

## 2025-06-13 RX ORDER — METOPROLOL TARTRATE 25 MG/1
25 TABLET, FILM COATED ORAL 2 TIMES DAILY
Qty: 180 TABLET | Refills: 1 | Status: SHIPPED | OUTPATIENT
Start: 2025-06-13

## 2025-06-13 RX ORDER — PANTOPRAZOLE SODIUM 40 MG/1
40 TABLET, DELAYED RELEASE ORAL DAILY
Qty: 90 TABLET | Refills: 1 | Status: SHIPPED | OUTPATIENT
Start: 2025-06-13

## 2025-06-13 RX ORDER — METOPROLOL TARTRATE 25 MG/1
25 TABLET, FILM COATED ORAL 2 TIMES DAILY
Qty: 180 TABLET | Refills: 1 | OUTPATIENT
Start: 2025-06-13

## 2025-06-18 ENCOUNTER — TELEPHONE (OUTPATIENT)
Dept: HEMATOLOGY/ONCOLOGY | Facility: CLINIC | Age: 50
End: 2025-06-18
Payer: MEDICAID

## 2025-06-19 ENCOUNTER — LAB VISIT (OUTPATIENT)
Dept: HEMATOLOGY/ONCOLOGY | Facility: CLINIC | Age: 50
End: 2025-06-19
Payer: MEDICAID

## 2025-06-19 DIAGNOSIS — N92.4 EXCESSIVE BLEEDING IN PREMENOPAUSAL PERIOD: ICD-10-CM

## 2025-06-19 DIAGNOSIS — Z86.2 HISTORY OF IRON DEFICIENCY ANEMIA: ICD-10-CM

## 2025-06-19 DIAGNOSIS — G25.81 RLS (RESTLESS LEGS SYNDROME): ICD-10-CM

## 2025-06-19 DIAGNOSIS — E55.9 VITAMIN D DEFICIENCY: ICD-10-CM

## 2025-06-19 DIAGNOSIS — D50.0 IRON DEFICIENCY ANEMIA SECONDARY TO BLOOD LOSS (CHRONIC): ICD-10-CM

## 2025-06-19 DIAGNOSIS — D47.2 MONOCLONAL GAMMOPATHY OF UNKNOWN SIGNIFICANCE (MGUS): ICD-10-CM

## 2025-06-19 LAB
ALBUMIN SERPL-MCNC: 3.8 G/DL (ref 3.5–5)
ALBUMIN/GLOB SERPL: 1.1 RATIO (ref 1.1–2)
ALP SERPL-CCNC: 58 UNIT/L (ref 40–150)
ALT SERPL-CCNC: 12 UNIT/L (ref 0–55)
ANION GAP SERPL CALC-SCNC: 9 MEQ/L
AST SERPL-CCNC: 19 UNIT/L (ref 11–45)
BASOPHILS # BLD AUTO: 0.03 X10(3)/MCL
BASOPHILS NFR BLD AUTO: 0.5 %
BILIRUB SERPL-MCNC: 0.3 MG/DL
BUN SERPL-MCNC: 12.8 MG/DL (ref 7–18.7)
CALCIUM SERPL-MCNC: 8.9 MG/DL (ref 8.4–10.2)
CHLORIDE SERPL-SCNC: 105 MMOL/L (ref 98–107)
CO2 SERPL-SCNC: 24 MMOL/L (ref 22–29)
CREAT SERPL-MCNC: 0.68 MG/DL (ref 0.55–1.02)
CREAT/UREA NIT SERPL: 19
EOSINOPHIL # BLD AUTO: 0.13 X10(3)/MCL (ref 0–0.9)
EOSINOPHIL NFR BLD AUTO: 2 %
ERYTHROCYTE [DISTWIDTH] IN BLOOD BY AUTOMATED COUNT: 15.8 % (ref 11.5–17)
FERRITIN SERPL-MCNC: 225.7 NG/ML (ref 4.63–204)
GFR SERPLBLD CREATININE-BSD FMLA CKD-EPI: >60 ML/MIN/1.73/M2
GLOBULIN SER-MCNC: 3.4 GM/DL (ref 2.4–3.5)
GLUCOSE SERPL-MCNC: 89 MG/DL (ref 74–100)
HCT VFR BLD AUTO: 41.6 % (ref 37–47)
HGB BLD-MCNC: 14.2 G/DL (ref 12–16)
IMM GRANULOCYTES # BLD AUTO: 0.01 X10(3)/MCL (ref 0–0.04)
IMM GRANULOCYTES NFR BLD AUTO: 0.2 %
IRON SATN MFR SERPL: 28 % (ref 20–50)
IRON SERPL-MCNC: 71 UG/DL (ref 50–170)
LYMPHOCYTES # BLD AUTO: 2.28 X10(3)/MCL (ref 0.6–4.6)
LYMPHOCYTES NFR BLD AUTO: 35.5 %
MCH RBC QN AUTO: 30.3 PG (ref 27–31)
MCHC RBC AUTO-ENTMCNC: 34.1 G/DL (ref 33–36)
MCV RBC AUTO: 88.9 FL (ref 80–94)
MONOCYTES # BLD AUTO: 0.5 X10(3)/MCL (ref 0.1–1.3)
MONOCYTES NFR BLD AUTO: 7.8 %
NEUTROPHILS # BLD AUTO: 3.48 X10(3)/MCL (ref 2.1–9.2)
NEUTROPHILS NFR BLD AUTO: 54 %
NRBC BLD AUTO-RTO: 0 %
PLATELET # BLD AUTO: 281 X10(3)/MCL (ref 130–400)
PMV BLD AUTO: 10.4 FL (ref 7.4–10.4)
POTASSIUM SERPL-SCNC: 4.1 MMOL/L (ref 3.5–5.1)
PROT SERPL-MCNC: 7.2 GM/DL (ref 6.4–8.3)
RBC # BLD AUTO: 4.68 X10(6)/MCL (ref 4.2–5.4)
SODIUM SERPL-SCNC: 138 MMOL/L (ref 136–145)
TIBC SERPL-MCNC: 182 UG/DL (ref 70–310)
TIBC SERPL-MCNC: 253 UG/DL (ref 250–450)
TRANSFERRIN SERPL-MCNC: 221 MG/DL (ref 180–382)
WBC # BLD AUTO: 6.43 X10(3)/MCL (ref 4.5–11.5)

## 2025-06-19 PROCEDURE — 80053 COMPREHEN METABOLIC PANEL: CPT

## 2025-06-19 PROCEDURE — 85025 COMPLETE CBC W/AUTO DIFF WBC: CPT

## 2025-06-19 PROCEDURE — 83540 ASSAY OF IRON: CPT

## 2025-06-19 PROCEDURE — 82728 ASSAY OF FERRITIN: CPT

## 2025-07-01 ENCOUNTER — TELEPHONE (OUTPATIENT)
Dept: HEMATOLOGY/ONCOLOGY | Facility: CLINIC | Age: 50
End: 2025-07-01
Payer: MEDICAID

## 2025-07-02 ENCOUNTER — TELEPHONE (OUTPATIENT)
Dept: HEMATOLOGY/ONCOLOGY | Facility: CLINIC | Age: 50
End: 2025-07-02
Payer: MEDICAID

## 2025-07-14 RX ORDER — LORATADINE 10 MG/1
10 TABLET ORAL DAILY
Qty: 90 TABLET | Refills: 1 | Status: SHIPPED | OUTPATIENT
Start: 2025-07-14

## 2025-07-17 ENCOUNTER — TELEPHONE (OUTPATIENT)
Dept: FAMILY MEDICINE | Facility: CLINIC | Age: 50
End: 2025-07-17
Payer: MEDICAID

## 2025-07-17 RX ORDER — PANTOPRAZOLE SODIUM 40 MG/1
40 TABLET, DELAYED RELEASE ORAL DAILY
Qty: 90 TABLET | Refills: 1 | OUTPATIENT
Start: 2025-07-17

## 2025-07-28 ENCOUNTER — TELEPHONE (OUTPATIENT)
Dept: HEMATOLOGY/ONCOLOGY | Facility: CLINIC | Age: 50
End: 2025-07-28
Payer: MEDICAID

## 2025-07-28 ENCOUNTER — PATIENT MESSAGE (OUTPATIENT)
Dept: FAMILY MEDICINE | Facility: CLINIC | Age: 50
End: 2025-07-28
Payer: MEDICAID

## 2025-07-28 NOTE — TELEPHONE ENCOUNTER
Called patient to confirm appointment for 7/29/25. Patient did not answer the phone left a voice message. Called patient daughter Jakobbhavani Pratt left message about appointment and for patient to call clinic back. Patient daughter verbalized understanding.

## 2025-07-28 NOTE — PROGRESS NOTES
[facility] Hematology/Oncology  PROGRESS NOTE    Subjective:       Patient ID: Pretty Pratt is a 50 y.o. female.    Diagnosis:   Iron deficiency anemia   History of microcytic anemia with suggestion of iron deficiency on partial iron studies Monoclonal gammopathy     Current Treatment:  IV iron PRN    Treatment History:  Feraheme x2 (2019)  Feraheme 510 mg IV x2 (06/15/2020, 2020)  Feraheme 510mg IV x2 (2020 & 2020)  Feraheme 510mg IV x2 (2022  & 2022)   Ferrlecit 125 mg x 8 weeks (2025-2025)    Chief Complaint: Follow-up (Patient reports fatigue and numbness and tingling in both hands as feet on and off.)    HPI:  For details, please see Dr. Leach's last note dated 10/08/2020. Please also refer to assessment and plan section.     Past medical history: Anemia. Anxiety. Allergic rhinitis. Benign cyst of breast. Cataracts. Constipation. Depression. Exercise intolerance. GERD. History of intracranial hemorrhage. Memory loss. Headaches. Hemorrhoids. Hypertension. Insomnia. Peptic ulcer disease. 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 Peptic ulcer. Bilateral mammoplasty, augmentation, silicone implants, and mastopexy 2017.  x2. Cholecystectomy.  Social history: Single. Lives in [place]. Has 2 children. Currently, unemployed. Used to work for the [employer]. Denies history of tobacco, alcohol, or illicit drug abuse.  Family history: Father experienced pancreatic cancer at age 56; used to smoke. Father is a sister experienced colon cancer in her 30s.  Health maintenance: 10/25/2018: Screening mammogram: No evidence of malignancy.  Menstrual history: Has menstrual cycles once or twice a month; does relate history of blood clots with menstrual flow.     Interval History:  Reports to clinic for a scheduled follow-up visit for iron-deficiency anemia.  She last received IV  iron in May of 2025.  She has a follow-up with gyn on 2025.  She continues with heavy cycles, states cycles last between 8 in 10 days with 1st 5 days being very heavy, she uses 4 pads daily and also passes clots.  She does report occasional fatigue and night sweats.  She denies any pain, headache, chest pain, shortness of breath, unintentional weight loss, lymphadenopathy, abdominal pain, nausea, vomiting, constipation, or diarrhea.  Labs and plan of care reviewed in detail with patient, verbalized understanding    PMX/PSHx  Past Medical History:   Diagnosis Date    Abnormal uterine bleeding 2022    EMB 2022, [facility]YN, offered IUD, pt declined    Allergic rhinitis 2019    Gastroesophageal reflux disease 2023 HH    Glaucoma 2023    Grade III hemorrhoids 2023    Hiatal hernia 2023    History of iron deficiency anemia 2022    PCE  2020, EGD/colon     HLD (hyperlipidemia)     Hypertension     ICH (intracerebral hemorrhage) 2023    10/2017, BP was 184/110, 4.5cm let parietal, min SAH, linear enhancement raised the possibility of an underlying vascular malformation left parietal/occipital with, MRV neg for cavernoma,  FU MRI W/WO 2017 improved but suggestive of AVM, last studies - 2018 MRI/MRA no new findings    Monoclonal gammopathy of unknown significance (MGUS) 2023    Pulmonary nodule 2023    4mm right major fissure, 10/2017, stable on 10/2018    RLS (restless legs syndrome) 2023    Controlled with Gabapentin    Thyroid nodule 2020: Right thyroid nodule, ultrasound-guided FNA: Benign follicular nodule with cystic degeneration      Vitamin D deficiency      -        Past Surgical History:   Procedure Laterality Date    AUGMENTATION OF BREAST      about age 40    BIOPSY OF THYROID  2020     SECTION      COLONOSCOPY  2019    [facility] clear    ESOPHAGOGASTRODUODENOSCOPY  2019     -   [facility]    INTRAUTERINE  DEVICE INSERTION  11/2023    [facility], G[place] in [place]    LAPAROSCOPIC CHOLECYSTECTOMY       Allergies:  Review of patient's allergies indicates:  No Known Allergies   Social History:   Social History[1]  Medications:  Current Outpatient Medications   Medication Instructions    clotrimazole-betamethasone 1-0.05% (LOTRISONE) cream Topical (Top), 2 times daily    FLUoxetine 10 mg, Oral, 2 times daily    fluticasone propionate (FLONASE) 100 mcg, Each Nostril, Daily    latanoprost 0.005 % ophthalmic solution 1 drop, Nightly    loratadine (CLARITIN) 10 mg, Oral, Daily    metoprolol tartrate (LOPRESSOR) 25 mg, Oral, 2 times daily    pantoprazole (PROTONIX) 40 mg, Oral, Daily    sucralfate (CARAFATE) 1 g, Oral, 2 times daily     ROS:  Review of Systems   Constitutional:  Negative for appetite change, chills, fatigue, fever and unexpected weight change.   HENT:  Negative for facial swelling, mouth sores, nosebleeds, sinus pressure/congestion and sore throat.    Eyes:  Negative for photophobia and pain.   Respiratory:  Negative for cough, chest tightness, shortness of breath and wheezing.    Cardiovascular:  Negative for chest pain, palpitations and leg swelling.   Gastrointestinal:  Negative for abdominal pain, blood in stool, change in bowel habit, constipation, diarrhea, nausea and vomiting.   Endocrine: Negative.    Genitourinary:  Negative for dysuria, frequency, hematuria, hot flashes, pelvic pain, urgency and vaginal pain.   Musculoskeletal:  Negative for arthralgias, gait problem, joint swelling and myalgias.   Integumentary:  Negative for pallor, rash, wound, breast mass, breast discharge and breast tenderness.   Allergic/Immunologic: Negative.  Negative for immunocompromised state.   Neurological:  Negative for dizziness, vertigo, syncope, weakness and numbness.   Hematological:  Negative for adenopathy. Does not bruise/bleed easily.   Psychiatric/Behavioral:  Negative for behavioral problems and dysphoric mood. The  patient is not nervous/anxious.    All other systems reviewed and are negative.  Breast: Negative for mass and tenderness      Objective:   Vitals:  Vitals:    07/29/25 1344   BP: 100/74   Pulse: 78   Resp: 19   Temp: 98 °F (36.7 °C)      Wt Readings from Last 3 Encounters:   07/29/25 1344 65.7 kg (144 lb 12.8 oz)   05/30/25 1115 68.5 kg (151 lb 1.6 oz)   05/07/25 1400 68.1 kg (150 lb 3.2 oz)      Physical Examination:  Physical Exam  Vitals and nursing note reviewed.   HENT:      Head: Normocephalic and atraumatic.      Mouth/Throat:      Mouth: Mucous membranes are moist.      Pharynx: Oropharynx is clear.   Eyes:      Extraocular Movements: Extraocular movements intact.      Conjunctiva/sclera: Conjunctivae normal.      Pupils: Pupils are equal, round, and reactive to light.   Cardiovascular:      Rate and Rhythm: Normal rate and regular rhythm.   Pulmonary:      Effort: Pulmonary effort is normal.      Breath sounds: Normal breath sounds.   Abdominal:      General: Abdomen is flat. Bowel sounds are normal.      Palpations: Abdomen is soft.   Musculoskeletal:         General: Normal range of motion.      Cervical back: Normal range of motion and neck supple.   Skin:     General: Skin is warm and dry.   Neurological:      General: No focal deficit present.      Mental Status: She is alert.   Psychiatric:         Mood and Affect: Mood normal.       ECOG SCORE           Labs:  Lab Visit on 07/29/2025   Component Date Value Ref Range Status    Sodium 07/29/2025 139  136 - 145 mmol/L Final    Potassium 07/29/2025 3.8  3.5 - 5.1 mmol/L Final    Chloride 07/29/2025 109 (H)  98 - 107 mmol/L Final    CO2 07/29/2025 25  22 - 29 mmol/L Final    Glucose 07/29/2025 79  74 - 100 mg/dL Final    Blood Urea Nitrogen 07/29/2025 11.9  7.0 - 18.7 mg/dL Final    Creatinine 07/29/2025 0.71  0.55 - 1.02 mg/dL Final    Calcium 07/29/2025 9.2  8.4 - 10.2 mg/dL Final    Protein Total 07/29/2025 7.3  6.4 - 8.3 gm/dL Final    Albumin  07/29/2025 3.7  3.5 - 5.0 g/dL Final    Globulin 07/29/2025 3.6 (H)  2.4 - 3.5 gm/dL Final    Albumin/Globulin Ratio 07/29/2025 1.0 (L)  1.1 - 2.0 ratio Final    Bilirubin Total 07/29/2025 0.7  <=1.5 mg/dL Final    ALP 07/29/2025 55  40 - 150 unit/L Final    ALT 07/29/2025 10  0 - 55 unit/L Final    AST 07/29/2025 16  11 - 45 unit/L Final    eGFR 07/29/2025 >60  mL/min/1.73/m2 Final    Estimated GFR calculated using the CKD-EPI creatinine (2021) equation.    Anion Gap 07/29/2025 5.0  mEq/L Final    BUN/Creatinine Ratio 07/29/2025 17   Final    Iron Binding Capacity Unsaturated 07/29/2025 206  70 - 310 ug/dL Final    Iron Level 07/29/2025 57  50 - 170 ug/dL Final    Transferrin 07/29/2025 223  180 - 382 mg/dL Final    Iron Binding Capacity Total 07/29/2025 263  250 - 450 ug/dL Final    Iron Saturation 07/29/2025 22  20 - 50 % Final    Ferritin Level 07/29/2025 121.76  4.63 - 204.00 ng/mL Final    WBC 07/29/2025 8.15  4.50 - 11.50 x10(3)/mcL Final    RBC 07/29/2025 4.48  4.20 - 5.40 x10(6)/mcL Final    Hgb 07/29/2025 14.2  12.0 - 16.0 g/dL Final    Hct 07/29/2025 40.8  37.0 - 47.0 % Final    MCV 07/29/2025 91.1  80.0 - 94.0 fL Final    MCH 07/29/2025 31.7 (H)  27.0 - 31.0 pg Final    MCHC 07/29/2025 34.8  33.0 - 36.0 g/dL Final    RDW 07/29/2025 12.6  11.5 - 17.0 % Final    Platelet 07/29/2025 254  130 - 400 x10(3)/mcL Final    MPV 07/29/2025 10.9 (H)  7.4 - 10.4 fL Final    Neut % 07/29/2025 70.4  % Final    Lymph % 07/29/2025 22.6  % Final    Mono % 07/29/2025 4.8  % Final    Eos % 07/29/2025 1.5  % Final    Basophil % 07/29/2025 0.6  % Final    Imm Grans % 07/29/2025 0.1  % Final    Neut # 07/29/2025 5.74  2.1 - 9.2 x10(3)/mcL Final    Lymph # 07/29/2025 1.84  0.6 - 4.6 x10(3)/mcL Final    Mono # 07/29/2025 0.39  0.1 - 1.3 x10(3)/mcL Final    Eos # 07/29/2025 0.12  0 - 0.9 x10(3)/mcL Final    Baso # 07/29/2025 0.05  <=0.2 x10(3)/mcL Final    Imm Gran # 07/29/2025 0.01  0.00 - 0.04 x10(3)/mcL Final    NRBC%  07/29/2025 0.0  % Final        Pathology      Radiology/Diagnostics:  No GI source of bleeding (EGD and colonoscopy: 05/2019)   -Capsule endoscopy (08/20/2020): Negative       I have reviewed all available lab results and radiology reports.  Assessment:   Iron Deficiency Anemia  Continue IV iron as needed  RTC in 2 months labs/NP  CBC CMP Iron/TIBC Ferritin  Problem List Items Addressed This Visit       Iron deficiency anemia secondary to blood loss (chronic) - Primary    Relevant Orders    CBC Auto Differential    Comprehensive Metabolic Panel    Iron and TIBC    Ferritin    Monoclonal gammopathy of unknown significance (MGUS)    Relevant Orders    Immunofixation & Protein Electrophoresis & Immunoglobulins    Immunoglobulin Free LT Chains Blood    Immunoglobulins (IgG, IgA, IgM) Quantitative    Excessive bleeding in premenopausal period    Relevant Orders    CBC Auto Differential    Comprehensive Metabolic Panel    Iron and TIBC    Ferritin         Plan:   07/29/2025   Labs and exam stable  Follow up with GYN, 07/31/2025  Continue IV iron as needed  RTC in 2 months labs/NP  CBC CMP Iron/TIBC Ferritin SPEP  Immunoglobulins         Providers:         Assessment / Plan   1. Iron deficiency anemia   -Severe iron deficiency anemia  -Iron pills caused constipation which caused bleeding hemorrhoids (2018)  -No response to oral iron therapy  -No GI source of bleeding (EGD and colonoscopy: 05/2019)  -Feraheme x2 (06/2019), with good response  -Deficiency recurred 11/2019 but hemoglobin normal  -06/05/2020: Ferritin 3.7, dropped; hemoglobin 9.5 (down from 14.4 on 11/14/2090)  -Feraheme 510 mg IV x2 (06/15/2020, 06/22/2020)  -Iron deficiency and anemia dramatically resolved (07/20/2020)  -Capsule endoscopy (08/20/2020): Negative  -10/07/2020: Remains iron deficient but hemoglobin is normal (transferrin saturation 10.2%, ferritin 10.1, hemoglobin 14)  (Ferritin was 117 on 07/20/2020)  -12/07/2020: Remains iron deficient but  hemoglobin remains normal (serum iron 24; TIBC 359; transferrin saturation 7%; ferritin 11.9, was 10.1 on 10/07/2020, 117 on 07/20/2020; hemoglobin 13.4)  Feraheme 510mg IV x2 (12/16/2022, 12/28/2022)  -12/20/2023 - Feraheme 510 mg IV x 2 (10/31/2023 and 11/07/2023). Hgb 15.5, Iron 78, Ferritin 425.40, iron sat 33%.      2. MGUS (monoclonal gammopathy of unknown significance)   -IgG kappa monoclonal gammopathy (MGUS)  -Diagnosed 04/2018  -Stable as of 11/2019  -05/18/2020, 06/05/2020: Stable  -06/05/2020: 0.3 g/dL IgG kappa monoclonal spike, stable since 11/2019   Patient has low risk IgG kappa monoclonal gammopathy (MGUS).  Repeat paraproteinemia labs in 1 year (05/2021).  -6/16/2021: M spike 0.47g/dL; K/L ratio WNL. No urine M spike.    # History of noncompliance with follow-up appointments with various specialties    # Thyroid mass/nodule, in need of biopsy (ultrasound 03/12/2019)  Ultrasound (03/12/2019): 3 cm solid nodule lower right thyroid  -Thyroid nodule biopsy scheduled for 01/13/2020  -01/13/2020: Right thyroid nodule, ultrasound-guided FNA: Benign follicular nodule with cystic degeneration    # Health maintenance:  -12/05/2019: Screening mammogram: No evidence of malignancy; BI-RADS 2: Benign  -Screening mammogram in 12/16/2020 - no mammographic concerns for malignancy     Plan:  -Status post Feraheme x2 (06/2020), with dramatic resolution of iron deficiency and anemia  -No GI source of bleeding (EGD, colonoscopy, capsule endoscopy)  -10/07/2020: Hemoglobin normal but iron deficiency has recurred since July 2020  -12/07/2020: Hemoglobin remains normal but iron deficiency persists  -Failed oral iron therapy in the past  -IV iron 10/31 and 11/07/2023. Iron stores repleted.      Follow up with NP in 3 months with lab work (CBC, CMP, iron panel, ferritin)  Follow up with OB-gyn            I have explained all of the above in detail and the patient understands all of the current recommendation(s). I have  answered all of their questions to the best of my ability and to their complete satisfaction.       Sierra Montes, ADRIANP-C  Oncology/Hematology   [facility]               [1]   Social History  Tobacco Use    Smoking status: Never    Smokeless tobacco: Never   Substance Use Topics    Alcohol use: Not Currently     Comment: occassionally    Drug use: Never

## 2025-07-29 ENCOUNTER — OFFICE VISIT (OUTPATIENT)
Dept: HEMATOLOGY/ONCOLOGY | Facility: CLINIC | Age: 50
End: 2025-07-29
Payer: MEDICAID

## 2025-07-29 VITALS
HEIGHT: 62 IN | TEMPERATURE: 98 F | SYSTOLIC BLOOD PRESSURE: 100 MMHG | WEIGHT: 144.81 LBS | BODY MASS INDEX: 26.65 KG/M2 | OXYGEN SATURATION: 99 % | RESPIRATION RATE: 19 BRPM | HEART RATE: 78 BPM | DIASTOLIC BLOOD PRESSURE: 74 MMHG

## 2025-07-29 DIAGNOSIS — N92.4 EXCESSIVE BLEEDING IN PREMENOPAUSAL PERIOD: ICD-10-CM

## 2025-07-29 DIAGNOSIS — D47.2 MONOCLONAL GAMMOPATHY OF UNKNOWN SIGNIFICANCE (MGUS): ICD-10-CM

## 2025-07-29 DIAGNOSIS — D50.0 IRON DEFICIENCY ANEMIA SECONDARY TO BLOOD LOSS (CHRONIC): Primary | ICD-10-CM

## 2025-07-29 PROCEDURE — 3078F DIAST BP <80 MM HG: CPT | Mod: CPTII,,,

## 2025-07-29 PROCEDURE — 1159F MED LIST DOCD IN RCRD: CPT | Mod: CPTII,,,

## 2025-07-29 PROCEDURE — 3008F BODY MASS INDEX DOCD: CPT | Mod: CPTII,,,

## 2025-07-29 PROCEDURE — 99213 OFFICE O/P EST LOW 20 MIN: CPT | Mod: S$PBB,,,

## 2025-07-29 PROCEDURE — 99214 OFFICE O/P EST MOD 30 MIN: CPT | Mod: PBBFAC

## 2025-07-29 PROCEDURE — 1160F RVW MEDS BY RX/DR IN RCRD: CPT | Mod: CPTII,,,

## 2025-07-29 PROCEDURE — 3074F SYST BP LT 130 MM HG: CPT | Mod: CPTII,,,
